# Patient Record
Sex: MALE | Race: WHITE | NOT HISPANIC OR LATINO | Employment: OTHER | URBAN - NONMETROPOLITAN AREA
[De-identification: names, ages, dates, MRNs, and addresses within clinical notes are randomized per-mention and may not be internally consistent; named-entity substitution may affect disease eponyms.]

---

## 2017-01-04 ENCOUNTER — OFFICE VISIT (OUTPATIENT)
Dept: OPHTHALMOLOGY | Facility: CLINIC | Age: 77
End: 2017-01-04

## 2017-01-04 DIAGNOSIS — Z96.1 PSEUDOPHAKIA: ICD-10-CM

## 2017-01-04 DIAGNOSIS — E11.9 DIABETES MELLITUS WITHOUT COMPLICATION (HCC): Primary | ICD-10-CM

## 2017-01-04 PROCEDURE — 99213 OFFICE O/P EST LOW 20 MIN: CPT | Performed by: OPHTHALMOLOGY

## 2017-01-04 NOTE — PROGRESS NOTES
Subjective   Eleno Savage is a 76 y.o. male.   Chief Complaint   Patient presents with   • Diabetic Eye Exam   • Artificial Lens Present       HPI     Diabetic Eye Exam   Vision is stable.  Diabetes characteristics include Type 2 and controlled with diet.  Duration of years.  Blood sugar level is controlled.       Last edited by Marito Elise MD on 1/4/2017  1:26 PM. (History)          Review of Systems   Eyes: Positive for visual disturbance. Negative for pain.       Objective   Visual Acuity (Snellen - Linear)      Right Left   Dist cc 20/20 20/30       Correction:  Glasses         Wearing Rx      Sphere Cylinder Axis Add   Right -0.75 +1.00 175 +2.50   Left -1.00 +1.00 179 +2.50           Manifest Refraction      Sphere Cylinder Axis   Right -0.75 +0.75 180   Left -1.00 +1.25 180       Comments:  R 20/20 L 20/30            Pupils      Pupils   Right PERRL   Left PERRL           Confrontational Visual Fields     Visual Fields      Left Right   Result Full Full                  Extraocular Movement      Right Left   Result Full, Ortho Full, Ortho              Tonometry (Applanation, 1:29 PM)      Right Left   Pressure 15 15              Main Ophthalmology Exam     External Exam      Right Left    External Normal Normal      Slit Lamp Exam      Right Left    Lids/Lashes Normal Normal    Conjunctiva/Sclera White and quiet White and quiet    Cornea Clear Clear    Anterior Chamber Deep and quiet Deep and quiet    Iris Round and reactive Round and reactive    Lens Posterior chamber intraocular lens Posterior chamber intraocular lens    Vitreous Normal Normal      Fundus Exam      Right Left    Disc Normal Normal    Macula Normal Normal    Vessels Normal Normal    Periphery Normal Normal    No BDR                Assessment/Plan   Problems Addressed this Visit     Pseudophakia    Diabetes mellitus without complication - Primary

## 2017-01-04 NOTE — MR AVS SNAPSHOT
Eleno Ferris Hussein   1/4/2017 1:00 PM   Office Visit    Dept Phone:  [phone]   Encounter #:  [id number]    Provider:  Marito Elise MD   Department:  [facility] OPHTHALMOLOGY                Your Full Care Plan              Your Updated Medication List          This list is accurate as of: 1/4/17  2:11 PM.  Always use your most recent med list.                ADVAIR DISKUS 250-50 MCG/DOSE DISKUS   Generic drug:  fluticasone-salmeterol       * albuterol (2.5 MG/3ML) 0.083% nebulizer solution   Commonly known as:  PROVENTIL       * PROAIR HFA IN       allopurinol 100 MG tablet   Commonly known as:  ZYLOPRIM   Take 1 tablet by mouth daily. FOR HIGH URIC ACID       amLODIPine-benazepril 5-20 MG per capsule   Commonly known as:  LOTREL 5-20       aspirin 325 MG tablet       busPIRone 10 MG tablet   Commonly known as:  BUSPAR   1 TABLET(S) BY MOUTH 2 TIMES PER DAY       carvedilol 3.125 MG tablet   Commonly known as:  COREG       cetirizine 10 MG tablet   Commonly known as:  zyrTEC   Take 1 tablet by mouth Daily As Needed for allergies. for allergies       clopidogrel 75 MG tablet   Commonly known as:  PLAVIX       DETROL LA 4 MG 24 hr capsule   Generic drug:  tolterodine LA       eszopiclone 3 MG tablet   Commonly known as:  LUNESTA   Take 1 tablet by mouth Every Night. Take immediately before bedtime       fenofibrate micronized 134 MG capsule   Commonly known as:  LOFIBRA   1 CAP(S) BY MOUTH DAILY       gabapentin 600 MG tablet   Commonly known as:  NEURONTIN       isosorbide mononitrate 30 MG 24 hr tablet   Commonly known as:  IMDUR       nitroglycerin 0.4 MG SL tablet   Commonly known as:  NITROSTAT       omeprazole 40 MG capsule   Commonly known as:  priLOSEC   TAKE 1 CAPSULE BY MOUTH TWO TIMES A DAY       SPIRIVA HANDIHALER 18 MCG per inhalation capsule   Generic drug:  tiotropium       tamsulosin 0.4 MG capsule 24 hr capsule   Commonly known as:  FLOMAX       traMADol 50 MG tablet   Commonly known as:  ULTRAM       traZODone 150 MG tablet   Commonly known as:  DESYREL   TAKE 1/2 TO 1 TABLET BY MOUTH EVERY NIGHT AT BEDTIME       triamterene-hydrochlorothiazide 37.5-25 MG per tablet   Commonly known as:  MAXZIDE-25       TUMS ULTRA PO       TYLENOL EXTRA STRENGTH PO       VITAMIN D2 PO       * Notice:  This list has 2 medication(s) that are the same as other medications prescribed for you. Read the directions carefully, and ask your doctor or other care provider to review them with you.            You Were Diagnosed With        Codes Comments    Diabetes mellitus without complication    -  Primary ICD-10-CM: E11.9  ICD-9-CM: 250.00     Pseudophakia     ICD-10-CM: Z96.1  ICD-9-CM: V43.1       Instructions     None    Patient Instructions History      Upcoming Appointments     Visit Type Date Time Department    OFFICE VISIT 2017  1:00 PM [facility] [place] [facility]    OFFICE VISIT 2017  9:45 AM [facility] [place] [facility]    FOLLOW UP 2017  8:15 AM [facility] [place] [facility]    OFFICE VISIT 2018  1:00 PM [facility] [place] [facility]     [facility] Walk Score allows you to send messages to your doctor, view your test results, renew your prescriptions, schedule appointments, and more. To sign up, go to [employer] and click on the Sign Up Now link in the New User? box. Enter your Walk Score Activation Code exactly as it appears below along with the last four digits of your Social Security Number and your Date of Birth () to complete the sign-up process. If you do not sign up before the expiration date, you must request a new code.    Walk Score Activation Code: [email or web address]  Expires: 2017  2:09 PM    If you have questions, you can email [email or web address] or call [phone] to talk to our Walk Score staff. Remember, Walk Score is NOT to be used for urgent needs. For medical emergencies, dial 911.               Other Info from Your Visit            Your Appointments     Jan 12, 2017  9:45 AM CST   Office Visit with Fabian Israel MD   [facility] PRIMARY CARE [place] (--)    [address] [place] [address] [address] [place] [address] [phone]           Arrive 15 minutes prior to appointment.            Aug 24, 2017  8:15 AM CDT   Follow Up with Charlie Dhillon MD   [facility] OTOLARYNGOLOGY (--)    [address] [place] [address] [address]  [facility] [address] [place] [address] [phone]           Arrive 15 minutes prior to appointment.            Jan 04, 2018  1:00 PM CST   Office Visit with Marito Elise MD   [facility] OPHTHALMOLOGY (--)    [address] [place] [address] [address]  [facility] [address] [facility] [address] [phone]           Arrive 15 minutes prior to appointment.              Allergies     Aminoglycosides      Bextra [Valdecoxib]      Celebrex [Celecoxib]      Contrast Dye      Indocin [Indomethacin]      Mobic [Meloxicam]      Motrin [Ibuprofen]      Niaspan [Niacin Er]      Nsaids      Vioxx [Rofecoxib]        Reason for Visit     Diabetic Eye Exam     Artificial Lens Present           Vital Signs     Smoking Status                   Former Smoker           Problems and Diagnoses Noted     Diabetes mellitus without complication    Pseudophakia

## 2017-01-05 ENCOUNTER — HOSPITAL ENCOUNTER (OUTPATIENT)
Dept: OTHER | Facility: HOSPITAL | Age: 77
Discharge: HOME | End: 2017-01-05

## 2017-01-05 LAB
ALBUMIN SERPL-MCNC: 3.9 GM/DL (ref 3.2–5.5)
ALP SERPL-CCNC: 57 U/L (ref 15–121)
ALT SERPL-CCNC: 20 U/L (ref 10–60)
ANION GAP SERPL CALCULATED.3IONS-SCNC: 8 MMOL/L (ref 5–15)
AST SERPL-CCNC: 20 U/L (ref 10–60)
BASOPHILS NFR BLD AUTO: 0.3 % (ref 0–2)
BILIRUB SERPL-MCNC: 0.6 MG/DL (ref 0.2–1)
BUN SERPL-MCNC: 22 MG/DL (ref 8–25)
CALCIUM SERPL-MCNC: 9.7 MG/DL (ref 8.4–10.8)
CHLORIDE SERPL-SCNC: 104 MMOL/L (ref 100–112)
CHOLEST SERPL-MCNC: 160 MG/DL (ref 150–200)
CO2 SERPL-SCNC: 29 MMOL/L (ref 20–32)
CREAT SERPL-MCNC: 1.8 MG/DL (ref 0.4–1.3)
EOSINOPHIL NFR BLD AUTO: 6.6 % (ref 0–7)
ERYTHROCYTE [DISTWIDTH] IN BLOOD: 14.4 % (ref 11.5–14.5)
GLUCOSE SERPL-MCNC: 123 MG/DL (ref 70–100)
GRANULOCYTES NFR BLD AUTO: 53.2 % (ref 37–80)
HCT VFR BLD CALC: 42.4 % (ref 39–49)
HDLC SERPL-MCNC: 23.9 MG/DL (ref 35–100)
HGB BLD-MCNC: 14.2 GM/DL (ref 13.7–17.3)
LDLC SERPL CALC-MCNC: 101 MG/DL
LYMPHOCYTES NFR BLD AUTO: 30.9 % (ref 10–50)
MCH RBC QN: 30.1 PG (ref 26–34)
MCHC RBC-ENTMCNC: 33.5 GM/DL (ref 31.5–36.3)
MCV RBC: 90 FL (ref 80–98)
MONOCYTES NFR BLD AUTO: 9 % (ref 0–12)
NRBC BLD AUTO-RTO: 0 %
NRBC SPEC MANUAL: 0
PLATELET # BLD: 215 X1000/MM3 (ref 150–450)
PMV BLD: 11.5 FL (ref 8–12)
POTASSIUM SERPL-SCNC: 4.4 MMOL/L (ref 3.4–5.4)
PROT SERPL-MCNC: 6.9 GM/DL (ref 6.7–8.2)
RBC # BLD: 4.71 MEGA/MM3 (ref 4.37–5.74)
SODIUM SERPL-SCNC: 141 MMOL/L (ref 134–146)
TRIGL SERPL-MCNC: 176 MG/DL (ref 35–160)
URATE SERPL-MCNC: 5.6 MG/DL (ref 2.6–7.2)
WBC # BLD: 5.8 X1000/UL (ref 3.2–9.8)

## 2017-01-05 RX ORDER — ISOSORBIDE MONONITRATE 30 MG/1
30 TABLET, EXTENDED RELEASE ORAL DAILY
Qty: 30 TABLET | Refills: 11 | Status: SHIPPED | OUTPATIENT
Start: 2017-01-05 | End: 2018-08-28 | Stop reason: SDUPTHER

## 2017-01-05 RX ORDER — ESZOPICLONE 3 MG/1
3 TABLET, FILM COATED ORAL NIGHTLY
Qty: 30 TABLET | Refills: 2 | Status: SHIPPED | OUTPATIENT
Start: 2017-01-05 | End: 2017-04-03 | Stop reason: SDUPTHER

## 2017-01-06 LAB
25(OH)D2 SERPL-MCNC: 45.5 NG/ML (ref 30–100)
CREAT UR-MCNC: 47.1 MG/DL
HBA1C MFR BLD CALC: 6.2 %TOTHGB (ref 4–5.6)
MICROALBUMIN UR-MCNC: 0.6 MG/DL (ref 0–1.7)
MICROALBUMIN/CREAT UR: 13 MG/G (ref 0–30)
TSH SERPL-ACNC: 1.32 UIU/ML (ref 0.46–4.68)

## 2017-01-09 RX ORDER — NITROGLYCERIN 0.4 MG/1
TABLET SUBLINGUAL
Qty: 25 TABLET | Refills: 11 | Status: SHIPPED | OUTPATIENT
Start: 2017-01-09 | End: 2019-01-16 | Stop reason: SDUPTHER

## 2017-01-12 ENCOUNTER — CLINICAL SUPPORT (OUTPATIENT)
Dept: FAMILY MEDICINE CLINIC | Facility: CLINIC | Age: 77
End: 2017-01-12

## 2017-01-12 ENCOUNTER — OFFICE VISIT (OUTPATIENT)
Dept: FAMILY MEDICINE CLINIC | Facility: CLINIC | Age: 77
End: 2017-01-12

## 2017-01-12 VITALS
WEIGHT: 266 LBS | HEART RATE: 88 BPM | DIASTOLIC BLOOD PRESSURE: 64 MMHG | SYSTOLIC BLOOD PRESSURE: 122 MMHG | TEMPERATURE: 97.8 F | BODY MASS INDEX: 41.75 KG/M2 | HEIGHT: 67 IN

## 2017-01-12 DIAGNOSIS — N18.30 CHRONIC KIDNEY DISEASE, STAGE 3 (HCC): ICD-10-CM

## 2017-01-12 DIAGNOSIS — E79.0 HYPERURICEMIA: ICD-10-CM

## 2017-01-12 DIAGNOSIS — E66.01 MORBID OBESITY DUE TO EXCESS CALORIES (HCC): ICD-10-CM

## 2017-01-12 DIAGNOSIS — K21.9 GASTROESOPHAGEAL REFLUX DISEASE, ESOPHAGITIS PRESENCE NOT SPECIFIED: ICD-10-CM

## 2017-01-12 DIAGNOSIS — E78.5 HYPERLIPIDEMIA, UNSPECIFIED HYPERLIPIDEMIA TYPE: ICD-10-CM

## 2017-01-12 DIAGNOSIS — Z12.5 SPECIAL SCREENING FOR MALIGNANT NEOPLASM OF PROSTATE: ICD-10-CM

## 2017-01-12 DIAGNOSIS — E55.9 VITAMIN D DEFICIENCY: ICD-10-CM

## 2017-01-12 DIAGNOSIS — Z23 PNEUMOCOCCAL VACCINATION ADMINISTERED AT CURRENT VISIT: Primary | ICD-10-CM

## 2017-01-12 DIAGNOSIS — I10 ESSENTIAL HYPERTENSION: ICD-10-CM

## 2017-01-12 DIAGNOSIS — J44.9 CHRONIC OBSTRUCTIVE PULMONARY DISEASE, UNSPECIFIED COPD TYPE (HCC): ICD-10-CM

## 2017-01-12 DIAGNOSIS — E11.8 TYPE 2 DIABETES MELLITUS WITH COMPLICATION, WITHOUT LONG-TERM CURRENT USE OF INSULIN (HCC): Primary | ICD-10-CM

## 2017-01-12 PROCEDURE — 99214 OFFICE O/P EST MOD 30 MIN: CPT | Performed by: INTERNAL MEDICINE

## 2017-01-12 PROCEDURE — 90471 IMMUNIZATION ADMIN: CPT | Performed by: INTERNAL MEDICINE

## 2017-01-12 PROCEDURE — 90670 PCV13 VACCINE IM: CPT | Performed by: INTERNAL MEDICINE

## 2017-01-12 NOTE — MR AVS SNAPSHOT
Eleno Ferris Hussein   1/12/2017 9:45 AM   Office Visit    Dept Phone:  [phone]   Encounter #:  [id number]    Provider:  Fabian Israel MD   Department:  [facility]IMARY C[facility] [place]                Your Full Care Plan              Your Updated Medication List          This list is accurate as of: 1/12/17 10:22 AM.  Always use your most recent med list.                ADVAIR DISKUS 250-50 MCG/DOSE DISKUS   Generic drug:  fluticasone-salmeterol       * albuterol (2.5 MG/3ML) 0.083% nebulizer solution   Commonly known as:  PROVENTIL       * PROAIR HFA IN       allopurinol 100 MG tablet   Commonly known as:  ZYLOPRIM   Take 1 tablet by mouth daily. FOR HIGH URIC ACID       amLODIPine-benazepril 5-20 MG per capsule   Commonly known as:  LOTREL 5-20       aspirin 325 MG tablet       busPIRone 10 MG tablet   Commonly known as:  BUSPAR   1 TABLET(S) BY MOUTH 2 TIMES PER DAY       carvedilol 3.125 MG tablet   Commonly known as:  COREG       cetirizine 10 MG tablet   Commonly known as:  zyrTEC   Take 1 tablet by mouth Daily As Needed for allergies. for allergies       clopidogrel 75 MG tablet   Commonly known as:  PLAVIX       DETROL LA 4 MG 24 hr capsule   Generic drug:  tolterodine LA       eszopiclone 3 MG tablet   Commonly known as:  LUNESTA   Take 1 tablet by mouth Every Night. Take immediately before bedtime       fenofibrate micronized 134 MG capsule   Commonly known as:  LOFIBRA   1 CAP(S) BY MOUTH DAILY       gabapentin 600 MG tablet   Commonly known as:  NEURONTIN       isosorbide mononitrate 30 MG 24 hr tablet   Commonly known as:  IMDUR   Take 1 tablet by mouth Daily.       NITROSTAT 0.4 MG SL tablet   Generic drug:  nitroglycerin   1 TABLET(S) SUBLINGUAL AS NEEDED FOR CHEST PAIN (MAY REPEAT EVERY 5 MINUTES BUT SEEK MEDICAL HELP IF PAIN PERSISTS AFTER 3 TABLETS)       omeprazole 40 MG capsule   Commonly known as:  priLOSEC   TAKE 1 CAPSULE BY MOUTH TWO TIMES A DAY       SPIRIVA HANDIHALER 18 MCG per inhalation capsule   Generic drug:  tiotropium       tamsulosin 0.4 MG capsule 24 hr capsule   Commonly known as:  FLOMAX       traMADol 50 MG tablet   Commonly known as:  ULTRAM       traZODone 150 MG tablet   Commonly known as:  DESYREL   TAKE 1/2 TO 1 TABLET BY MOUTH EVERY NIGHT AT BEDTIME       triamterene-hydrochlorothiazide 37.5-25 MG per tablet   Commonly known as:  MAXZIDE-25       TUMS ULTRA PO       TYLENOL EXTRA STRENGTH PO       VITAMIN D2 PO       * Notice:  This list has 2 medication(s) that are the same as other medications prescribed for you. Read the directions carefully, and ask your doctor or other care provider to review them with you.            You Were Diagnosed With        Codes Comments    Essential hypertension    -  Primary ICD-10-CM: I10  ICD-9-CM: 401.9     Chronic obstructive pulmonary disease, unspecified COPD type     ICD-10-CM: J44.9  ICD-9-CM: 496     Gastroesophageal reflux disease, esophagitis presence not specified     ICD-10-CM: K21.9  ICD-9-CM: 530.81     Morbid obesity due to excess calories     ICD-10-CM: E66.01  ICD-9-CM: 278.01     Vitamin D deficiency     ICD-10-CM: E55.9  ICD-9-CM: 268.9     Chronic kidney disease, stage 3     ICD-10-CM: N18.3  ICD-9-CM: 585.3     Hyperlipidemia, unspecified hyperlipidemia type     ICD-10-CM: E78.5  ICD-9-CM: 272.4     Hyperuricemia     ICD-10-CM: E79.0  ICD-9-CM: 790.6     Type 2 diabetes mellitus with complication, without long-term current use of insulin     ICD-10-CM: E11.8  ICD-9-CM: 250.90     Special screening for malignant neoplasm of prostate     ICD-10-CM: Z12.5  ICD-9-CM: V76.44       Instructions     None    Patient Instructions History      Upcoming Appointments     Visit Type Date Time Department    OFFICE VISIT 1/12/2017  9:45 AM [facility]    OFFICE VISIT 7/17/2017 10:15 AM [facility]    FOLLOW UP 8/24/2017  8:15 AM [facility] OTOLARYNGOLOGY [facility]    OFFICE VISIT 1/4/2018  1:00 PM [facility]  OPHTHALMOLOGY [facility]      [employer] Signup     [facility] upad allows you to send messages to your doctor, view your test results, renew your prescriptions, schedule appointments, and more. To sign up, go to [email or web address] and click on the Sign Up Now link in the New User? box. Enter your upad Activation Code exactly as it appears below along with the last four digits of your Social Security Number and your Date of Birth () to complete the sign-up process. If you do not sign up before the expiration date, you must request a new code.    upad Activation Code: [email or web address]  Expires: 2017  4:36 AM    If you have questions, you can email [email or web address] or call [phone] to talk to our upad staff. Remember, upad is NOT to be used for urgent needs. For medical emergencies, dial 911.               Other Info from Your Visit           Your Appointments     2017 10:15 AM CDT   Office Visit with Fabian Israel MD   [facility] PRIMARY CARE [place] (--)    [address] [place] [address] [address] [place] [address] [phone]           Arrive 15 minutes prior to appointment.            Aug 24, 2017  8:15 AM CDT   Follow Up with Charlie Dhillon MD   [facility] OTOLARYNGOLOGY (--)    [address] [place] [address] [address]  [facility] [address] [place] [address] [phone]           Arrive 15 minutes prior to appointment.            2018  1:00 PM CST   Office Visit with Marito Elise MD   [facility] OPHTHALMOLOGY (--)    [address] [place] [address] [address]  [facility] [address] [place] [address] [phone]           Arrive 15 minutes prior to appointment.              Allergies     Aminoglycosides      Bextra [Valdecoxib]      Celebrex [Celecoxib]      Contrast Dye      Indocin [Indomethacin]      Mobic [Meloxicam]      Motrin [Ibuprofen]      Niaspan [Niacin Er]      Nsaids      Vioxx [Rofecoxib]        Reason for  "Visit     Follow-up 6 month    Results lab      Vital Signs     Blood Pressure Pulse Temperature Height Weight Body Mass Index    122/64 88 97.8 °F (36.6 °C) 67\" (170.2 cm) 266 lb (121 kg) 41.66 kg/m2    Smoking Status                   Former Smoker           Problems and Diagnoses Noted     Chronic kidney disease, stage 3    Chronic airway obstruction    High blood pressure    Acid reflux disease    High cholesterol or triglycerides    Elevated blood uric acid level    Obesity    Type 2 diabetes    Vitamin D deficiency    Screening for prostate cancer            "

## 2017-01-12 NOTE — PROGRESS NOTES
"Subjective     Eleno Savage is a 76 y.o. male.     History of Present Illness   Eleno is here for followup of issues including Type 2 diabetes, stage III chronic kidney disease, obesity,  COPD, GERD,  vitamin D deficiency, and high cholesterol/high triglycerides.  Dr. Farooq follows his coronary artery disease.  He continues to follow with Dr. Weathers, nephrology annually for CKD.      His blood pressure is well controlled today.    I recommend Prevnar vaccine today and he is in agreement.  He had Pneumovax several years ago.    His weight is up 8 pounds in the past 6 months.  His prior weight in the summer was 258 pounds.  We had a lengthy discussion regarding his orbital obesity and need to aggressively reduce weight.  He is mildly motivated.    His labs are all reviewed with results listed below.  A1c is 6.2.  LDL is 101.  Creatinine is slightly improved at 1.8.    Review of Systems   Constitutional: Negative for chills, fatigue and fever.   HENT: Negative for congestion, ear pain, postnasal drip, sinus pressure and sore throat.    Respiratory: Negative for cough, shortness of breath and wheezing.    Cardiovascular: Positive for leg swelling. Negative for chest pain and palpitations.   Gastrointestinal: Negative for abdominal pain, blood in stool, constipation, diarrhea, nausea and vomiting.   Endocrine: Negative for cold intolerance, heat intolerance, polydipsia and polyuria.   Genitourinary: Negative for dysuria, frequency, hematuria and urgency.   Skin: Negative for rash.   Neurological: Negative for syncope and weakness.       Objective     Visit Vitals   • /64   • Pulse 88   • Temp 97.8 °F (36.6 °C)   • Ht 67\" (170.2 cm)   • Wt 266 lb (121 kg)   • BMI 41.66 kg/m2       Physical Exam   Constitutional: He is oriented to person, place, and time. He appears well-developed and well-nourished. No distress.   Pleasant, morbidly obese.  accompanied by his wife.   HENT:   Head: Normocephalic and atraumatic. "   Nose: Right sinus exhibits no maxillary sinus tenderness and no frontal sinus tenderness. Left sinus exhibits no maxillary sinus tenderness and no frontal sinus tenderness.   Mouth/Throat: Uvula is midline, oropharynx is clear and moist and mucous membranes are normal. No oral lesions. No tonsillar exudate.   Crowded posterior oropharynx   Eyes: Conjunctivae and EOM are normal. Pupils are equal, round, and reactive to light.   Neck: Trachea normal. Neck supple. No JVD present. Carotid bruit is not present. No tracheal deviation present. No thyroid mass and no thyromegaly present.   Cardiovascular: Normal rate, regular rhythm and normal heart sounds.   No extrasystoles are present. PMI is not displaced.    No murmur heard.  Pulmonary/Chest: Effort normal and breath sounds normal. No accessory muscle usage. No respiratory distress. He has no decreased breath sounds. He has no wheezes. He has no rhonchi. He has no rales.   Abdominal: Soft. Bowel sounds are normal. He exhibits no distension. There is no hepatosplenomegaly. There is no tenderness.       Vascular Status -  His exam exhibits right foot vasculature normal and right foot edema (trace pedal and ankle edema bilaterally consistent with chronic venous insufficiency). His exam exhibits left foot vasculature normal and left foot edema.  Lymphadenopathy:     He has no cervical adenopathy.   Neurological: He is alert and oriented to person, place, and time. No cranial nerve deficit. Coordination normal.   Skin: Skin is warm, dry and intact. No rash noted. No cyanosis. Nails show no clubbing.   Psychiatric: He has a normal mood and affect. His speech is normal and behavior is normal. Thought content normal.   Vitals reviewed.      Assessment/Plan   Prevnar vaccine today.    Greatly intensify efforts at diabetic diet, exercise, and weight loss.  His carbohydrate intake is excessive, particularly breads including biscuits and cornbread.  Diagnoses and all orders for  this visit:    Type 2 diabetes mellitus with complication, without long-term current use of insulin  -     TSH; Future  -     CBC Auto Differential; Future  -     Comprehensive Metabolic Panel; Future  -     Hemoglobin A1c; Future  -     LDL Cholesterol, Direct; Future    Essential hypertension    Chronic obstructive pulmonary disease, unspecified COPD type    Gastroesophageal reflux disease, esophagitis presence not specified    Morbid obesity due to excess calories    Vitamin D deficiency  -     Vitamin D 25 Hydroxy; Future    Chronic kidney disease, stage 3    Hyperlipidemia, unspecified hyperlipidemia type  -     LDL Cholesterol, Direct; Future    Hyperuricemia    Special screening for malignant neoplasm of prostate  -     PSA Screen; Future      Hospital Outpatient Visit on 01/05/2017   Component Date Value Ref Range Status   • WBC 01/05/2017 5.8  3.2 - 9.8 x1000/uL Final   • RBC 01/05/2017 4.71  4.37 - 5.74 anne-marie/mm3 Final   • Hemoglobin 01/05/2017 14.2  13.7 - 17.3 gm/dl Final   • Hematocrit 01/05/2017 42.4  39.0 - 49.0 % Final   • MCV 01/05/2017 90.0  80.0 - 98.0 fl Final   • MCH 01/05/2017 30.1  26.0 - 34.0 pg Final   • MCHC 01/05/2017 33.5  31.5 - 36.3 gm/dl Final   • Platelets 01/05/2017 215  150 - 450 x1000/mm3 Final   • RDW 01/05/2017 14.4  11.5 - 14.5 % Final   • MPV 01/05/2017 11.5  8.0 - 12.0 fl Final   • Neutrophil Rel % 01/05/2017 53.2  37.0 - 80.0 % Final   • Lymphocyte Rel % 01/05/2017 30.9  10.0 - 50.0 % Final   • Monocyte Rel % 01/05/2017 9.0  0.0 - 12.0 % Final   • Eosinophil Rel % 01/05/2017 6.6  0.0 - 7.0 % Final   • Basophil Rel % 01/05/2017 0.3  0.0 - 2.0 % Final   • nRBC 01/05/2017 0   Final   • nRBC 01/05/2017 0   Final   • Glucose 01/05/2017 123* 70.0 - 100.0 mg/dl Final   • BUN 01/05/2017 22  8.0 - 25.0 mg/dl Final   • Creatinine 01/05/2017 1.8* 0.4 - 1.3 mg/dl Final   • Sodium 01/05/2017 141.0  134 - 146 mmol/L Final   • Potassium 01/05/2017 4.4  3.4 - 5.4 mmol/L Final   • Chloride  01/05/2017 104.0  100.0 - 112.0 mmol/L Final   • CO2 01/05/2017 29.0  20.0 - 32.0 mmol/L Final   • Calcium 01/05/2017 9.7  8.4 - 10.8 mg/dl Final   • Total Protein 01/05/2017 6.9  6.7 - 8.2 gm/dl Final   • Albumin 01/05/2017 3.9  3.2 - 5.5 gm/dl Final   • Total Bilirubin 01/05/2017 0.6  0.2 - 1.0 mg/dl Final   • Alkaline Phosphatase 01/05/2017 57  15 - 121 U/L Final   • ALT (SGPT) 01/05/2017 20  10 - 60 U/L Final   • AST (SGOT) 01/05/2017 20  10 - 60 U/L Final   • GFR MDRD Non  01/05/2017 37* 42 - 98 mL/min/1.73 sq.M Final    Comment: Invalid if creatinine is changing or the patient is on dialysis. Use AA  result if patient is -American, non AA result otherwise.     • GFR MDRD  01/05/2017 44  42 - 98 mL/min/1.73 sq.M Final   • Anion Gap 01/05/2017 8.0  5.0 - 15.0 mmol/L Final   • Uric Acid 01/05/2017 5.6  2.6 - 7.2 mg/dl Final   • Total Cholesterol 01/05/2017 160  150 - 200 mg/dl Final    CHOL DESIRED: < 200 MG/DL   • Triglycerides 01/05/2017 176* 35 - 160 mg/dl Final    TRIG DESIRED: < 200 MG/DL   • HDL Cholesterol 01/05/2017 23.9* 35.0 - 100.0 mg/dl Final    HDL HIGH RISK: < 35 MG/DL   • LDL Cholesterol  01/05/2017 101  mg/dl Final    Comment: LDL DESIRED: < 130 MG/DL  LDL DESIRED: < 130 MG/DL  LDL DESIRED: < 130 MG/DL     • 25 Hydroxy, Vitamin D 01/05/2017 45.5  30.0 - 100.0 ng/ml Final    Comment: INTERPRETIVE INFORMATION:  Deficient...................<20 ng/ml  Insufficient..........20-<30 ng/ml  Sufficient.............. ng/ml  Potiential Toxicity.....100 ng/ml       • Hemoglobin A1C 01/05/2017 6.2* 4.0 - 5.6 %TotHgb Final   • TSH 01/05/2017 1.32  0.46 - 4.68 uIU/ml Final   • Creatinine, Urine 01/05/2017 47.1  mg/dl Final   • Albumin, U 01/05/2017 0.6  0.0 - 1.7 mg/dl Final   • Microalbumin/Creatinine Ratio 01/05/2017 13  0 - 30 mg/g Final   ]

## 2017-01-17 RX ORDER — GABAPENTIN 600 MG/1
TABLET ORAL
Qty: 30 TABLET | Refills: 6 | Status: SHIPPED | OUTPATIENT
Start: 2017-01-17 | End: 2017-07-17 | Stop reason: SDUPTHER

## 2017-02-06 RX ORDER — TAMSULOSIN HYDROCHLORIDE 0.4 MG/1
1 CAPSULE ORAL NIGHTLY
Qty: 30 CAPSULE | Refills: 5 | Status: SHIPPED | OUTPATIENT
Start: 2017-02-06 | End: 2017-07-05 | Stop reason: SDUPTHER

## 2017-03-06 RX ORDER — CARVEDILOL 3.12 MG/1
TABLET ORAL
Qty: 60 TABLET | Refills: 11 | Status: SHIPPED | OUTPATIENT
Start: 2017-03-06 | End: 2018-04-25 | Stop reason: SDUPTHER

## 2017-03-23 ENCOUNTER — LAB (OUTPATIENT)
Dept: LAB | Facility: OTHER | Age: 77
End: 2017-03-23

## 2017-03-23 ENCOUNTER — TRANSCRIBE ORDERS (OUTPATIENT)
Dept: LAB | Facility: OTHER | Age: 77
End: 2017-03-23

## 2017-03-23 DIAGNOSIS — N18.30 CHRONIC RENAL DISEASE, STAGE III (HCC): Primary | ICD-10-CM

## 2017-03-23 DIAGNOSIS — N18.30 CHRONIC RENAL DISEASE, STAGE III (HCC): ICD-10-CM

## 2017-03-23 LAB
ALBUMIN SERPL-MCNC: 3.8 G/DL (ref 3.2–5.5)
ANION GAP SERPL CALCULATED.3IONS-SCNC: 10 MMOL/L (ref 5–15)
BUN BLD-MCNC: 30 MG/DL (ref 8–25)
BUN/CREAT SERPL: 17.6 (ref 7–25)
CALCIUM SPEC-SCNC: 9.5 MG/DL (ref 8.4–10.8)
CHLORIDE SERPL-SCNC: 107 MMOL/L (ref 100–112)
CO2 SERPL-SCNC: 26 MMOL/L (ref 20–32)
CREAT BLD-MCNC: 1.7 MG/DL (ref 0.4–1.3)
GFR SERPL CREATININE-BSD FRML MDRD: 39 ML/MIN/1.73 (ref 42–98)
GLUCOSE BLD-MCNC: 113 MG/DL (ref 70–100)
PHOSPHATE SERPL-MCNC: 3.4 MG/DL (ref 2.5–4.6)
POTASSIUM BLD-SCNC: 4.2 MMOL/L (ref 3.4–5.4)
SODIUM BLD-SCNC: 143 MMOL/L (ref 134–146)

## 2017-03-23 PROCEDURE — 80069 RENAL FUNCTION PANEL: CPT | Performed by: INTERNAL MEDICINE

## 2017-03-23 PROCEDURE — 36415 COLL VENOUS BLD VENIPUNCTURE: CPT | Performed by: INTERNAL MEDICINE

## 2017-03-23 PROCEDURE — 84156 ASSAY OF PROTEIN URINE: CPT | Performed by: INTERNAL MEDICINE

## 2017-03-23 PROCEDURE — 82570 ASSAY OF URINE CREATININE: CPT | Performed by: INTERNAL MEDICINE

## 2017-03-25 LAB
CREAT 24H UR-MCNC: 64.5 MG/DL
PROT UR-MCNC: 7.2 MG/DL
PROT/CREAT UR: 112 MG/G CREAT (ref 0–200)

## 2017-04-05 RX ORDER — ESZOPICLONE 3 MG/1
TABLET, FILM COATED ORAL
Qty: 30 TABLET | Refills: 2 | Status: SHIPPED | OUTPATIENT
Start: 2017-04-05 | End: 2017-07-05 | Stop reason: SDUPTHER

## 2017-05-09 RX ORDER — AMLODIPINE BESYLATE AND BENAZEPRIL HYDROCHLORIDE 5; 20 MG/1; MG/1
CAPSULE ORAL
Qty: 60 CAPSULE | Refills: 11 | Status: SHIPPED | OUTPATIENT
Start: 2017-05-09 | End: 2018-04-30 | Stop reason: SDUPTHER

## 2017-06-20 RX ORDER — TOLTERODINE 4 MG/1
4 CAPSULE, EXTENDED RELEASE ORAL NIGHTLY
Qty: 30 CAPSULE | Refills: 11 | Status: SHIPPED | OUTPATIENT
Start: 2017-06-20 | End: 2018-06-13 | Stop reason: SDUPTHER

## 2017-07-05 RX ORDER — TAMSULOSIN HYDROCHLORIDE 0.4 MG/1
CAPSULE ORAL
Qty: 30 CAPSULE | Refills: 5 | Status: SHIPPED | OUTPATIENT
Start: 2017-07-05 | End: 2018-01-03 | Stop reason: SDUPTHER

## 2017-07-05 RX ORDER — ESZOPICLONE 3 MG/1
TABLET, FILM COATED ORAL
Qty: 30 TABLET | Refills: 2 | OUTPATIENT
Start: 2017-07-05 | End: 2017-07-07 | Stop reason: SDUPTHER

## 2017-07-07 RX ORDER — ESZOPICLONE 3 MG/1
TABLET, FILM COATED ORAL
Qty: 30 TABLET | Refills: 2 | Status: SHIPPED | OUTPATIENT
Start: 2017-07-07 | End: 2017-12-29 | Stop reason: SDUPTHER

## 2017-07-10 ENCOUNTER — LAB (OUTPATIENT)
Dept: LAB | Facility: OTHER | Age: 77
End: 2017-07-10

## 2017-07-10 DIAGNOSIS — E11.8 TYPE 2 DIABETES MELLITUS WITH COMPLICATION, WITHOUT LONG-TERM CURRENT USE OF INSULIN (HCC): ICD-10-CM

## 2017-07-10 DIAGNOSIS — E78.5 HYPERLIPIDEMIA, UNSPECIFIED HYPERLIPIDEMIA TYPE: ICD-10-CM

## 2017-07-10 DIAGNOSIS — E55.9 VITAMIN D DEFICIENCY: ICD-10-CM

## 2017-07-10 DIAGNOSIS — Z12.5 SPECIAL SCREENING FOR MALIGNANT NEOPLASM OF PROSTATE: ICD-10-CM

## 2017-07-10 LAB
ALBUMIN SERPL-MCNC: 4.1 G/DL (ref 3.2–5.5)
ALBUMIN/GLOB SERPL: 1.4 G/DL (ref 1–3)
ALP SERPL-CCNC: 59 U/L (ref 15–121)
ALT SERPL W P-5'-P-CCNC: 17 U/L (ref 10–60)
ANION GAP SERPL CALCULATED.3IONS-SCNC: 10 MMOL/L (ref 5–15)
ARTICHOKE IGE QN: 112 MG/DL (ref 0–129)
AST SERPL-CCNC: 17 U/L (ref 10–60)
BASOPHILS # BLD AUTO: 0.02 10*3/MM3 (ref 0–0.2)
BASOPHILS NFR BLD AUTO: 0.3 % (ref 0–2)
BILIRUB SERPL-MCNC: 0.5 MG/DL (ref 0.2–1)
BUN BLD-MCNC: 33 MG/DL (ref 8–25)
BUN/CREAT SERPL: 17.4 (ref 7–25)
CALCIUM SPEC-SCNC: 9.8 MG/DL (ref 8.4–10.8)
CHLORIDE SERPL-SCNC: 105 MMOL/L (ref 100–112)
CO2 SERPL-SCNC: 27 MMOL/L (ref 20–32)
CREAT BLD-MCNC: 1.9 MG/DL (ref 0.4–1.3)
DEPRECATED RDW RBC AUTO: 47.9 FL (ref 35.1–43.9)
EOSINOPHIL # BLD AUTO: 0.31 10*3/MM3 (ref 0–0.7)
EOSINOPHIL NFR BLD AUTO: 4.5 % (ref 0–7)
ERYTHROCYTE [DISTWIDTH] IN BLOOD BY AUTOMATED COUNT: 14.6 % (ref 11.5–14.5)
GFR SERPL CREATININE-BSD FRML MDRD: 34 ML/MIN/1.73 (ref 42–98)
GLOBULIN UR ELPH-MCNC: 2.9 GM/DL (ref 2.5–4.6)
GLUCOSE BLD-MCNC: 121 MG/DL (ref 70–100)
HCT VFR BLD AUTO: 43.7 % (ref 39–49)
HGB BLD-MCNC: 14.6 G/DL (ref 13.7–17.3)
LYMPHOCYTES # BLD AUTO: 1.69 10*3/MM3 (ref 0.6–4.2)
LYMPHOCYTES NFR BLD AUTO: 24.6 % (ref 10–50)
MCH RBC QN AUTO: 30.7 PG (ref 26.5–34)
MCHC RBC AUTO-ENTMCNC: 33.4 G/DL (ref 31.5–36.3)
MCV RBC AUTO: 91.8 FL (ref 80–98)
MONOCYTES # BLD AUTO: 0.64 10*3/MM3 (ref 0–0.9)
MONOCYTES NFR BLD AUTO: 9.3 % (ref 0–12)
NEUTROPHILS # BLD AUTO: 4.2 10*3/MM3 (ref 2–8.6)
NEUTROPHILS NFR BLD AUTO: 61.3 % (ref 37–80)
PLATELET # BLD AUTO: 195 10*3/MM3 (ref 150–450)
PMV BLD AUTO: 12.1 FL (ref 8–12)
POTASSIUM BLD-SCNC: 4.6 MMOL/L (ref 3.4–5.4)
PROT SERPL-MCNC: 7 G/DL (ref 6.7–8.2)
RBC # BLD AUTO: 4.76 10*6/MM3 (ref 4.37–5.74)
SODIUM BLD-SCNC: 142 MMOL/L (ref 134–146)
WBC NRBC COR # BLD: 6.86 10*3/MM3 (ref 3.2–9.8)

## 2017-07-10 PROCEDURE — 80053 COMPREHEN METABOLIC PANEL: CPT | Performed by: INTERNAL MEDICINE

## 2017-07-10 PROCEDURE — 82306 VITAMIN D 25 HYDROXY: CPT | Performed by: INTERNAL MEDICINE

## 2017-07-10 PROCEDURE — 36415 COLL VENOUS BLD VENIPUNCTURE: CPT | Performed by: INTERNAL MEDICINE

## 2017-07-10 PROCEDURE — G0103 PSA SCREENING: HCPCS | Performed by: INTERNAL MEDICINE

## 2017-07-10 PROCEDURE — 83036 HEMOGLOBIN GLYCOSYLATED A1C: CPT | Performed by: INTERNAL MEDICINE

## 2017-07-10 PROCEDURE — 85025 COMPLETE CBC W/AUTO DIFF WBC: CPT | Performed by: INTERNAL MEDICINE

## 2017-07-10 PROCEDURE — 83721 ASSAY OF BLOOD LIPOPROTEIN: CPT | Performed by: INTERNAL MEDICINE

## 2017-07-10 PROCEDURE — 84443 ASSAY THYROID STIM HORMONE: CPT | Performed by: INTERNAL MEDICINE

## 2017-07-11 LAB
25(OH)D3 SERPL-MCNC: 50.4 NG/ML (ref 30–100)
HBA1C MFR BLD: 6.12 % (ref 4–5.6)
PSA SERPL-MCNC: 0.1 NG/ML (ref 0–4)
TSH SERPL DL<=0.05 MIU/L-ACNC: 1.37 MIU/ML (ref 0.46–4.68)

## 2017-07-17 ENCOUNTER — OFFICE VISIT (OUTPATIENT)
Dept: FAMILY MEDICINE CLINIC | Facility: CLINIC | Age: 77
End: 2017-07-17

## 2017-07-17 VITALS
WEIGHT: 264 LBS | HEIGHT: 67 IN | BODY MASS INDEX: 41.44 KG/M2 | SYSTOLIC BLOOD PRESSURE: 140 MMHG | HEART RATE: 80 BPM | TEMPERATURE: 98.7 F | DIASTOLIC BLOOD PRESSURE: 60 MMHG

## 2017-07-17 DIAGNOSIS — I25.10 CORONARY ARTERIOSCLEROSIS: Chronic | ICD-10-CM

## 2017-07-17 DIAGNOSIS — N40.1 BENIGN PROSTATIC HYPERPLASIA WITH LOWER URINARY TRACT SYMPTOMS, UNSPECIFIED MORPHOLOGY: Chronic | ICD-10-CM

## 2017-07-17 DIAGNOSIS — K21.9 GASTROESOPHAGEAL REFLUX DISEASE, ESOPHAGITIS PRESENCE NOT SPECIFIED: Chronic | ICD-10-CM

## 2017-07-17 DIAGNOSIS — E55.9 VITAMIN D DEFICIENCY: Chronic | ICD-10-CM

## 2017-07-17 DIAGNOSIS — J43.2 CENTRILOBULAR EMPHYSEMA (HCC): Chronic | ICD-10-CM

## 2017-07-17 DIAGNOSIS — E78.2 MIXED HYPERLIPIDEMIA: Chronic | ICD-10-CM

## 2017-07-17 DIAGNOSIS — E78.1 HYPERTRIGLYCERIDEMIA: Chronic | ICD-10-CM

## 2017-07-17 DIAGNOSIS — G47.33 OBSTRUCTIVE SLEEP APNEA SYNDROME: Chronic | ICD-10-CM

## 2017-07-17 DIAGNOSIS — E79.0 HYPERURICEMIA: Chronic | ICD-10-CM

## 2017-07-17 DIAGNOSIS — M10.30 GOUT DUE TO RENAL IMPAIRMENT, UNSPECIFIED CHRONICITY, UNSPECIFIED SITE: Chronic | ICD-10-CM

## 2017-07-17 DIAGNOSIS — E66.01 MORBID OBESITY DUE TO EXCESS CALORIES (HCC): Chronic | ICD-10-CM

## 2017-07-17 DIAGNOSIS — E11.22 TYPE 2 DIABETES MELLITUS WITH STAGE 3 CHRONIC KIDNEY DISEASE, WITHOUT LONG-TERM CURRENT USE OF INSULIN (HCC): Primary | Chronic | ICD-10-CM

## 2017-07-17 DIAGNOSIS — N18.30 CHRONIC KIDNEY DISEASE, STAGE 3 (HCC): Chronic | ICD-10-CM

## 2017-07-17 DIAGNOSIS — N18.30 TYPE 2 DIABETES MELLITUS WITH STAGE 3 CHRONIC KIDNEY DISEASE, WITHOUT LONG-TERM CURRENT USE OF INSULIN (HCC): Primary | Chronic | ICD-10-CM

## 2017-07-17 PROBLEM — E11.9 DIABETES MELLITUS WITHOUT COMPLICATION: Chronic | Status: ACTIVE | Noted: 2017-01-04

## 2017-07-17 PROCEDURE — 99214 OFFICE O/P EST MOD 30 MIN: CPT | Performed by: INTERNAL MEDICINE

## 2017-07-17 RX ORDER — CLOPIDOGREL BISULFATE 75 MG/1
TABLET ORAL
Qty: 30 TABLET | Refills: 12 | Status: SHIPPED | OUTPATIENT
Start: 2017-07-17 | End: 2018-08-08 | Stop reason: SDUPTHER

## 2017-07-17 RX ORDER — ATORVASTATIN CALCIUM 20 MG/1
20 TABLET, FILM COATED ORAL EVERY OTHER DAY
Qty: 15 TABLET | Refills: 11 | Status: SHIPPED | OUTPATIENT
Start: 2017-07-17 | End: 2018-01-19

## 2017-07-17 RX ORDER — GABAPENTIN 600 MG/1
TABLET ORAL
Qty: 30 TABLET | Refills: 6 | Status: SHIPPED | OUTPATIENT
Start: 2017-07-17 | End: 2018-01-19 | Stop reason: SDUPTHER

## 2017-07-17 NOTE — PROGRESS NOTES
Subjective         History of Present Illness     Eleno Savage is a 77 y.o. Male here for 6-month follow up on type 2 diabetes mellitus, stage 3 chronic kidney disease, obesity, COPD, GERD, vitamin D deficiency, and high cholesterol/high triglycerides.  Dr. Mesa is his cardiologist following his coronary artery disease.  He reports a strong family history of coronary disease, but denies any current chest pain.  He also follows with Dr. Weathers, nephrologist annually for CKD.  He continues to wear CPAP for sleep apnea symptoms.  GERD symptoms are currently adequately controlled.  His diabetes is excellently controlled.        He reports mild BPH symptoms reporting nocturia x 2 nightly.  He reports daytime urinary stream is adequate.      He continues to have chronic osteoarthrtic pain at multiple joints.  We discussed how weight loss and physical activity would benefit the pain as well as overall health.  I recommended he take Tylenol in combination with his pain regimen to help with pain.        His LDL is above goal in this patient with coronary artery disease.  He is on fenofibrate currently.  With his stage 3 kidney disease, I recommended we try statin therapy.  He reports he took Lipitor in the past and thinks he may have had some myalgias, but isn't exactly sure.  I recommended he stop the fenofibrate and start Lipitor every other day.         Blood pressure is at goal.   He reports blood pressure was 120/67 at home this morning.  Weight is down 2 pounds in the past six months.      The patient's relevant past medical, surgical, and social history was reviewed in Epic.   Lab results are reviewed with the patient today.  Fasting glucose 121.  A1c is 6.12.  Liver  function normal.  Renal function   Thyroid function normal.  LDL is 112. PSA is normal.     Review of Systems   Constitutional: Negative for chills, fatigue and fever.   HENT: Negative for congestion, ear pain, postnasal drip, sinus pressure and  "sore throat.    Respiratory: Negative for cough, shortness of breath and wheezing.    Cardiovascular: Negative for chest pain, palpitations and leg swelling.   Gastrointestinal: Negative for abdominal pain, blood in stool, constipation, diarrhea, nausea and vomiting.   Endocrine: Negative for cold intolerance, heat intolerance, polydipsia and polyuria.   Genitourinary: Negative for dysuria, frequency, hematuria and urgency.   Skin: Negative for rash.   Neurological: Negative for syncope and weakness.      PHQ-9 Depression Screening 7/17/2017   Little interest or pleasure in doing things 0   Feeling down, depressed, or hopeless 0   PHQ-9 Total Score 0       Objective     Vitals:    07/17/17 1009   BP: 140/60   Pulse: 80   Temp: 98.7 °F (37.1 °C)   TempSrc: Oral   Weight: 264 lb (120 kg)   Height: 67\" (170.2 cm)     Physical Exam   Constitutional: He is oriented to person, place, and time. He appears well-developed and well-nourished. No distress.   Obese male.    HENT:   Head: Normocephalic and atraumatic.   Nose: Right sinus exhibits no maxillary sinus tenderness and no frontal sinus tenderness. Left sinus exhibits no maxillary sinus tenderness and no frontal sinus tenderness.   Mouth/Throat: Uvula is midline, oropharynx is clear and moist and mucous membranes are normal. No oral lesions. No tonsillar exudate.   Eyes: Conjunctivae and EOM are normal. Pupils are equal, round, and reactive to light.   Neck: Trachea normal. Neck supple. No JVD present. Carotid bruit is not present. No tracheal deviation present. No thyroid mass and no thyromegaly present.   Cardiovascular: Normal rate, regular rhythm and normal heart sounds.   No extrasystoles are present. PMI is not displaced.    No murmur heard.  Pulmonary/Chest: Effort normal and breath sounds normal. No accessory muscle usage. No respiratory distress. He has no decreased breath sounds. He has no wheezes. He has no rhonchi. He has no rales.   Abdominal: Soft. Bowel " sounds are normal. He exhibits no distension. There is no hepatosplenomegaly. There is no tenderness.   Obese abdomen limits exam.         Vascular Status -  His exam exhibits right foot edema (1+ edema bilaterally consistent with chronic venous insufficiency). His exam exhibits right foot vasculature abnormal. His exam exhibits left foot edema. His exam exhibits left foot vasculature abnormal.  Lymphadenopathy:     He has no cervical adenopathy.   Neurological: He is alert and oriented to person, place, and time. No cranial nerve deficit. Coordination normal.   Skin: Skin is warm, dry and intact. No rash noted. No cyanosis. Nails show no clubbing.   Psychiatric: He has a normal mood and affect. His speech is normal and behavior is normal. Thought content normal.   Vitals reviewed.       Assessment/Plan      Stop the fenofibrate and start Lipitor 20 mg q.o.d.   I asked him to double his efforts at reducing carbohydrates, especially with stopping the fenofibrate.  Notify me if he has tolerability issues with statin therapy.  I would like to avoid to drug therapy in this patient if possible.      He is given a refill on his  Gabapentin 600 mg.  Patient understands the risks associated with this controlled medication, including tolerance and addiction.  He also agrees to only obtain this medication from me, and not from a another provider, unless that provider is covering for me in my absence.  He also agrees to be compliant in dosing, and not self adjust the dose of medication.  A signed controlled substance agreement is on file, and he has received a controlled substance education sheet at this a previous visit.  He has also signed a consent for treatment with a controlled substance as per [facility] policy. NEFTALY was obtained.    I recommended he add Tylenol 500 mg 3-4 times daily as needed for breakthrough arthritic symptoms.      I emphasized how very important it is that he intensify diet and weight loss  activities.  He reports being rather physically active, and we encouraged that as well.  His morbid obesity is producing multiple medical and musculoskeletal issues.    Continue other medications and vitamin and mineral supplements to treat additional medical problems which we addressed today.  He will return in six months for follow up with fasting labs one week prior.   Intensify diet, exercise, and weight loss efforts.  Written literature regarding weight loss and exercise included in AVS today.       Scribed for Dr. Israel by Lacy Bustos [facility].     Diagnoses and all orders for this visit:    Type 2 diabetes mellitus with stage 3 chronic kidney disease, without long-term current use of insulin  -     CBC Auto Differential; Future  -     Comprehensive Metabolic Panel; Future  -     Hemoglobin A1c; Future    Hypertriglyceridemia    Mixed hyperlipidemia    Coronary arteriosclerosis    Obstructive sleep apnea syndrome    Centrilobular emphysema    Vitamin D deficiency  -     Vitamin D 25 Hydroxy; Future    Gastroesophageal reflux disease, esophagitis presence not specified    Morbid obesity due to excess calories    Chronic kidney disease, stage 3    Benign prostatic hyperplasia with lower urinary tract symptoms, unspecified morphology    Hyperuricemia  -     Uric Acid; Future    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 For cholesterol      Lab on 07/10/2017   Component Date Value Ref Range Status   • 25 Hydroxy, Vitamin D 07/10/2017 50.4  30.0 - 100.0 ng/ml Final   • TSH 07/10/2017 1.370  0.460 - 4.680 mIU/mL Final   • WBC 07/10/2017 6.86  3.20 - 9.80 10*3/mm3 Final   • RBC 07/10/2017 4.76  4.37 - 5.74 10*6/mm3 Final   • Hemoglobin 07/10/2017 14.6  13.7 - 17.3 g/dL Final   • Hematocrit 07/10/2017 43.7  39.0 - 49.0 %  Final   • MCV 07/10/2017 91.8  80.0 - 98.0 fL Final   • MCH 07/10/2017 30.7  26.5 - 34.0 pg Final   • MCHC 07/10/2017 33.4  31.5 - 36.3 g/dL Final   • RDW 07/10/2017 14.6* 11.5 - 14.5 % Final   • RDW-SD 07/10/2017 47.9* 35.1 - 43.9 fl Final   • MPV 07/10/2017 12.1* 8.0 - 12.0 fL Final   • Platelets 07/10/2017 195  150 - 450 10*3/mm3 Final   • Neutrophil % 07/10/2017 61.3  37.0 - 80.0 % Final   • Lymphocyte % 07/10/2017 24.6  10.0 - 50.0 % Final   • Monocyte % 07/10/2017 9.3  0.0 - 12.0 % Final   • Eosinophil % 07/10/2017 4.5  0.0 - 7.0 % Final   • Basophil % 07/10/2017 0.3  0.0 - 2.0 % Final   • Neutrophils, Absolute 07/10/2017 4.20  2.00 - 8.60 10*3/mm3 Final   • Lymphocytes, Absolute 07/10/2017 1.69  0.60 - 4.20 10*3/mm3 Final   • Monocytes, Absolute 07/10/2017 0.64  0.00 - 0.90 10*3/mm3 Final   • Eosinophils, Absolute 07/10/2017 0.31  0.00 - 0.70 10*3/mm3 Final   • Basophils, Absolute 07/10/2017 0.02  0.00 - 0.20 10*3/mm3 Final   • Glucose 07/10/2017 121* 70 - 100 mg/dL Final   • BUN 07/10/2017 33* 8 - 25 mg/dL Final   • Creatinine 07/10/2017 1.90* 0.40 - 1.30 mg/dL Final   • Sodium 07/10/2017 142  134 - 146 mmol/L Final   • Potassium 07/10/2017 4.6  3.4 - 5.4 mmol/L Final   • Chloride 07/10/2017 105  100 - 112 mmol/L Final   • CO2 07/10/2017 27.0  20.0 - 32.0 mmol/L Final   • Calcium 07/10/2017 9.8  8.4 - 10.8 mg/dL Final   • Total Protein 07/10/2017 7.0  6.7 - 8.2 g/dL Final   • Albumin 07/10/2017 4.10  3.20 - 5.50 g/dL Final   • ALT (SGPT) 07/10/2017 17  10 - 60 U/L Final   • AST (SGOT) 07/10/2017 17  10 - 60 U/L Final   • Alkaline Phosphatase 07/10/2017 59  15 - 121 U/L Final   • Total Bilirubin 07/10/2017 0.5  0.2 - 1.0 mg/dL Final   • eGFR Non African Amer 07/10/2017 34* 42 - 98 mL/min/1.73 Final   • Globulin 07/10/2017 2.9  2.5 - 4.6 gm/dL Final   • A/G Ratio 07/10/2017 1.4  1.0 - 3.0 g/dL Final   • BUN/Creatinine Ratio 07/10/2017 17.4  7.0 - 25.0 Final   • Anion Gap 07/10/2017 10.0  5.0 - 15.0 mmol/L  Final   • Hemoglobin A1C 07/10/2017 6.12* 4 - 5.6 % Final   • LDL Cholesterol  07/10/2017 112  0 - 129 mg/dL Final   • PSA 07/10/2017 0.103  0.000 - 4.000 ng/mL Final   ]

## 2017-08-04 RX ORDER — TRIAMTERENE AND HYDROCHLOROTHIAZIDE 37.5; 25 MG/1; MG/1
TABLET ORAL
Qty: 30 TABLET | Refills: 11 | Status: SHIPPED | OUTPATIENT
Start: 2017-08-04 | End: 2018-07-31 | Stop reason: SDUPTHER

## 2017-08-15 RX ORDER — ALLOPURINOL 100 MG/1
TABLET ORAL
Qty: 30 TABLET | Refills: 11 | Status: SHIPPED | OUTPATIENT
Start: 2017-08-15 | End: 2018-09-05 | Stop reason: SDUPTHER

## 2017-08-24 ENCOUNTER — OFFICE VISIT (OUTPATIENT)
Dept: OTOLARYNGOLOGY | Facility: CLINIC | Age: 77
End: 2017-08-24

## 2017-08-24 VITALS — TEMPERATURE: 97 F | BODY MASS INDEX: 41.44 KG/M2 | HEIGHT: 67 IN | WEIGHT: 264 LBS

## 2017-08-24 DIAGNOSIS — J31.0 CHRONIC RHINITIS: ICD-10-CM

## 2017-08-24 DIAGNOSIS — G47.33 OBSTRUCTIVE SLEEP APNEA SYNDROME: Primary | Chronic | ICD-10-CM

## 2017-08-24 PROCEDURE — 99213 OFFICE O/P EST LOW 20 MIN: CPT | Performed by: OTOLARYNGOLOGY

## 2017-08-24 RX ORDER — FLUTICASONE PROPIONATE 50 MCG
2 SPRAY, SUSPENSION (ML) NASAL DAILY
COMMUNITY
End: 2018-09-24 | Stop reason: SDUPTHER

## 2017-08-24 RX ORDER — TIZANIDINE 4 MG/1
4 TABLET ORAL NIGHTLY PRN
COMMUNITY
End: 2019-07-30

## 2017-08-24 NOTE — PROGRESS NOTES
Subjective   Eleno Savage is a 77 y.o. male.       History of Present Illness     Patient is followed with obstructive sleep apnea syndrome and chronic rhinitis.  Utilize his CPAP.  Brings his compliance data with him and as always this is excellent.  Had 100% usage greater than 4 hours every night.  Average utilization was greater than 8 hours per night.  Average large leak time was only 31 seconds and estimated apnea hypopnea index was 2.  Patient reports he continues to feel well rested after using the CPAP.  He utilizes Flonase and Zyrtec for his nasal symptoms and states these work well.    The following portions of the patient's history were reviewed and updated as appropriate: allergies, current medications, past family history, past medical history, past social history, past surgical history and problem list.     reports that he has quit smoking. He has never used smokeless tobacco. He reports that he does not drink alcohol or use illicit drugs.   Patient is not a tobacco user and has not been counseled for use of tobacco products      Review of Systems   Constitutional: Negative for fever.           Objective   Physical Exam    General: Well-developed well-nourished male in no acute distress.  Alert and oriented ×3. Head: Normocephalic. Face: Symmetrical strength and appearance. PERRL. EOMI. Voice:Strong. Speech:Fluent  Ears: External ears no deformity, canals no discharge, tympanic membranes intact clear and mobile bilaterally.  Nose: Nares show no discharge mass polyp or purulence.  Boggy mucosa is present.  No gross external deformity.  Septum: Midline  Oral cavity: Lips and gums without lesions.  Tongue and floor of mouth without lesions.  Parotid and submandibular ducts unobstructed.  No mucosal lesions on the buccal mucosa or vestibule of the mouth.  Pharynx: No erythema exudate mass or ulcer  Neck: No lymphadenopathy.  No thyromegaly.  Trachea and larynx midline.  No masses in the parotid or  submandibular glands.    Assessment/Plan   Eleno was seen today for follow-up.    Diagnoses and all orders for this visit:    Obstructive sleep apnea syndrome    Chronic rhinitis        Plan: Continue CPAP usage nightly.  Continue Flonase and Zyrtec for his rhinitis.  Return in 1 year call sooner for problems.

## 2017-09-13 ENCOUNTER — TRANSCRIBE ORDERS (OUTPATIENT)
Dept: LAB | Facility: OTHER | Age: 77
End: 2017-09-13

## 2017-09-13 DIAGNOSIS — N18.30 CHRONIC KIDNEY DISEASE, STAGE III (MODERATE) (HCC): Primary | ICD-10-CM

## 2017-10-17 RX ORDER — CETIRIZINE HYDROCHLORIDE 10 MG/1
10 TABLET ORAL DAILY PRN
Qty: 30 TABLET | Refills: 11 | Status: SHIPPED | OUTPATIENT
Start: 2017-10-17 | End: 2018-01-19 | Stop reason: CLARIF

## 2017-10-31 RX ORDER — OMEPRAZOLE 40 MG/1
CAPSULE, DELAYED RELEASE ORAL
Qty: 60 CAPSULE | Refills: 11 | Status: SHIPPED | OUTPATIENT
Start: 2017-10-31 | End: 2018-10-08 | Stop reason: SDUPTHER

## 2017-11-16 RX ORDER — TRAZODONE HYDROCHLORIDE 150 MG/1
TABLET ORAL
Qty: 30 TABLET | Refills: 11 | OUTPATIENT
Start: 2017-11-16

## 2017-11-16 RX ORDER — BUSPIRONE HYDROCHLORIDE 10 MG/1
TABLET ORAL
Qty: 60 TABLET | Refills: 11 | Status: SHIPPED | OUTPATIENT
Start: 2017-11-16 | End: 2018-08-28 | Stop reason: SDUPTHER

## 2017-12-04 RX ORDER — ESZOPICLONE 3 MG/1
TABLET, FILM COATED ORAL
Qty: 30 TABLET | Refills: 5 | OUTPATIENT
Start: 2017-12-04

## 2017-12-13 RX ORDER — TRAZODONE HYDROCHLORIDE 150 MG/1
TABLET ORAL
Qty: 30 TABLET | Refills: 11 | OUTPATIENT
Start: 2017-12-13

## 2017-12-18 RX ORDER — TRAZODONE HYDROCHLORIDE 150 MG/1
TABLET ORAL
Qty: 30 TABLET | Refills: 11 | Status: SHIPPED | OUTPATIENT
Start: 2017-12-18 | End: 2018-08-28 | Stop reason: SDUPTHER

## 2017-12-29 RX ORDER — ESZOPICLONE 3 MG/1
TABLET, FILM COATED ORAL
Qty: 30 TABLET | Refills: 0 | Status: SHIPPED | OUTPATIENT
Start: 2017-12-29 | End: 2018-01-19 | Stop reason: SDUPTHER

## 2018-01-04 RX ORDER — TAMSULOSIN HYDROCHLORIDE 0.4 MG/1
CAPSULE ORAL
Qty: 30 CAPSULE | Refills: 5 | Status: SHIPPED | OUTPATIENT
Start: 2018-01-04 | End: 2018-07-03 | Stop reason: SDUPTHER

## 2018-01-15 ENCOUNTER — LAB (OUTPATIENT)
Dept: LAB | Facility: OTHER | Age: 78
End: 2018-01-15

## 2018-01-15 DIAGNOSIS — N18.30 TYPE 2 DIABETES MELLITUS WITH STAGE 3 CHRONIC KIDNEY DISEASE, WITHOUT LONG-TERM CURRENT USE OF INSULIN (HCC): Chronic | ICD-10-CM

## 2018-01-15 DIAGNOSIS — E11.22 TYPE 2 DIABETES MELLITUS WITH STAGE 3 CHRONIC KIDNEY DISEASE, WITHOUT LONG-TERM CURRENT USE OF INSULIN (HCC): Chronic | ICD-10-CM

## 2018-01-15 DIAGNOSIS — E79.0 HYPERURICEMIA: Chronic | ICD-10-CM

## 2018-01-15 DIAGNOSIS — N18.30 CHRONIC KIDNEY DISEASE, STAGE III (MODERATE) (HCC): ICD-10-CM

## 2018-01-15 DIAGNOSIS — E55.9 VITAMIN D DEFICIENCY: Chronic | ICD-10-CM

## 2018-01-15 LAB
25(OH)D3 SERPL-MCNC: 51.9 NG/ML (ref 30–100)
ALBUMIN SERPL-MCNC: 3.8 G/DL (ref 3.2–5.5)
ALBUMIN/GLOB SERPL: 1.1 G/DL (ref 1–3)
ALP SERPL-CCNC: 78 U/L (ref 15–121)
ALT SERPL W P-5'-P-CCNC: 16 U/L (ref 10–60)
ANION GAP SERPL CALCULATED.3IONS-SCNC: 9 MMOL/L (ref 5–15)
AST SERPL-CCNC: 19 U/L (ref 10–60)
BASOPHILS # BLD AUTO: 0.03 10*3/MM3 (ref 0–0.2)
BASOPHILS NFR BLD AUTO: 0.4 % (ref 0–2)
BILIRUB SERPL-MCNC: 0.7 MG/DL (ref 0.2–1)
BUN BLD-MCNC: 16 MG/DL (ref 8–25)
BUN/CREAT SERPL: 12.3 (ref 7–25)
CALCIUM SPEC-SCNC: 9.1 MG/DL (ref 8.4–10.8)
CHLORIDE SERPL-SCNC: 103 MMOL/L (ref 100–112)
CO2 SERPL-SCNC: 27 MMOL/L (ref 20–32)
CREAT BLD-MCNC: 1.3 MG/DL (ref 0.4–1.3)
DEPRECATED RDW RBC AUTO: 46.4 FL (ref 35.1–43.9)
EOSINOPHIL # BLD AUTO: 0.26 10*3/MM3 (ref 0–0.7)
EOSINOPHIL NFR BLD AUTO: 3.3 % (ref 0–7)
ERYTHROCYTE [DISTWIDTH] IN BLOOD BY AUTOMATED COUNT: 14.7 % (ref 11.5–14.5)
GFR SERPL CREATININE-BSD FRML MDRD: 53 ML/MIN/1.73 (ref 42–98)
GLOBULIN UR ELPH-MCNC: 3.4 GM/DL (ref 2.5–4.6)
GLUCOSE BLD-MCNC: 113 MG/DL (ref 70–100)
HBA1C MFR BLD: 6.1 % (ref 4–5.6)
HCT VFR BLD AUTO: 43.2 % (ref 39–49)
HGB BLD-MCNC: 14.6 G/DL (ref 13.7–17.3)
LYMPHOCYTES # BLD AUTO: 1.73 10*3/MM3 (ref 0.6–4.2)
LYMPHOCYTES NFR BLD AUTO: 21.8 % (ref 10–50)
MCH RBC QN AUTO: 29.6 PG (ref 26.5–34)
MCHC RBC AUTO-ENTMCNC: 33.8 G/DL (ref 31.5–36.3)
MCV RBC AUTO: 87.4 FL (ref 80–98)
MONOCYTES # BLD AUTO: 0.68 10*3/MM3 (ref 0–0.9)
MONOCYTES NFR BLD AUTO: 8.6 % (ref 0–12)
NEUTROPHILS # BLD AUTO: 5.24 10*3/MM3 (ref 2–8.6)
NEUTROPHILS NFR BLD AUTO: 65.9 % (ref 37–80)
PLATELET # BLD AUTO: 203 10*3/MM3 (ref 150–450)
PMV BLD AUTO: 11.8 FL (ref 8–12)
POTASSIUM BLD-SCNC: 3.5 MMOL/L (ref 3.4–5.4)
PROT SERPL-MCNC: 7.2 G/DL (ref 6.7–8.2)
RBC # BLD AUTO: 4.94 10*6/MM3 (ref 4.37–5.74)
SODIUM BLD-SCNC: 139 MMOL/L (ref 134–146)
URATE SERPL-MCNC: 6.5 MG/DL (ref 2.6–7.2)
WBC NRBC COR # BLD: 7.94 10*3/MM3 (ref 3.2–9.8)

## 2018-01-15 PROCEDURE — 84550 ASSAY OF BLOOD/URIC ACID: CPT | Performed by: INTERNAL MEDICINE

## 2018-01-15 PROCEDURE — 82306 VITAMIN D 25 HYDROXY: CPT | Performed by: INTERNAL MEDICINE

## 2018-01-15 PROCEDURE — 80053 COMPREHEN METABOLIC PANEL: CPT | Performed by: INTERNAL MEDICINE

## 2018-01-15 PROCEDURE — 83036 HEMOGLOBIN GLYCOSYLATED A1C: CPT | Performed by: INTERNAL MEDICINE

## 2018-01-15 PROCEDURE — 36415 COLL VENOUS BLD VENIPUNCTURE: CPT | Performed by: INTERNAL MEDICINE

## 2018-01-15 PROCEDURE — 85025 COMPLETE CBC W/AUTO DIFF WBC: CPT | Performed by: INTERNAL MEDICINE

## 2018-01-19 ENCOUNTER — OFFICE VISIT (OUTPATIENT)
Dept: FAMILY MEDICINE CLINIC | Facility: CLINIC | Age: 78
End: 2018-01-19

## 2018-01-19 VITALS
HEART RATE: 60 BPM | DIASTOLIC BLOOD PRESSURE: 60 MMHG | BODY MASS INDEX: 41.75 KG/M2 | WEIGHT: 266 LBS | TEMPERATURE: 98.1 F | SYSTOLIC BLOOD PRESSURE: 122 MMHG | HEIGHT: 67 IN

## 2018-01-19 DIAGNOSIS — M54.16 LUMBAR RADICULOPATHY: Chronic | ICD-10-CM

## 2018-01-19 DIAGNOSIS — N40.1 BENIGN PROSTATIC HYPERPLASIA WITH NOCTURIA: Chronic | ICD-10-CM

## 2018-01-19 DIAGNOSIS — IMO0001 CLASS 3 OBESITY DUE TO EXCESS CALORIES WITH SERIOUS COMORBIDITY AND BODY MASS INDEX (BMI) OF 40.0 TO 44.9 IN ADULT: Chronic | ICD-10-CM

## 2018-01-19 DIAGNOSIS — E55.9 VITAMIN D DEFICIENCY: Chronic | ICD-10-CM

## 2018-01-19 DIAGNOSIS — I25.10 CORONARY ARTERIOSCLEROSIS: Chronic | ICD-10-CM

## 2018-01-19 DIAGNOSIS — E11.22 TYPE 2 DIABETES MELLITUS WITH STAGE 2 CHRONIC KIDNEY DISEASE, WITHOUT LONG-TERM CURRENT USE OF INSULIN (HCC): Primary | Chronic | ICD-10-CM

## 2018-01-19 DIAGNOSIS — Z12.5 SPECIAL SCREENING FOR MALIGNANT NEOPLASM OF PROSTATE: ICD-10-CM

## 2018-01-19 DIAGNOSIS — E78.2 MIXED HYPERLIPIDEMIA: Chronic | ICD-10-CM

## 2018-01-19 DIAGNOSIS — N18.2 TYPE 2 DIABETES MELLITUS WITH STAGE 2 CHRONIC KIDNEY DISEASE, WITHOUT LONG-TERM CURRENT USE OF INSULIN (HCC): Primary | Chronic | ICD-10-CM

## 2018-01-19 DIAGNOSIS — N18.30 CHRONIC KIDNEY DISEASE, STAGE 3 (HCC): Chronic | ICD-10-CM

## 2018-01-19 DIAGNOSIS — E78.1 HYPERTRIGLYCERIDEMIA: Chronic | ICD-10-CM

## 2018-01-19 DIAGNOSIS — G47.33 OBSTRUCTIVE SLEEP APNEA SYNDROME: Chronic | ICD-10-CM

## 2018-01-19 DIAGNOSIS — E79.0 HYPERURICEMIA: Chronic | ICD-10-CM

## 2018-01-19 DIAGNOSIS — M10.30 GOUT DUE TO RENAL IMPAIRMENT, UNSPECIFIED CHRONICITY, UNSPECIFIED SITE: Chronic | ICD-10-CM

## 2018-01-19 DIAGNOSIS — J43.2 CENTRILOBULAR EMPHYSEMA (HCC): Chronic | ICD-10-CM

## 2018-01-19 DIAGNOSIS — R35.1 BENIGN PROSTATIC HYPERPLASIA WITH NOCTURIA: Chronic | ICD-10-CM

## 2018-01-19 PROCEDURE — 99214 OFFICE O/P EST MOD 30 MIN: CPT | Performed by: INTERNAL MEDICINE

## 2018-01-19 RX ORDER — ESZOPICLONE 3 MG/1
TABLET, FILM COATED ORAL
Qty: 30 TABLET | Refills: 2 | Status: SHIPPED | OUTPATIENT
Start: 2018-01-19 | End: 2018-04-30 | Stop reason: SDUPTHER

## 2018-01-19 RX ORDER — GABAPENTIN 600 MG/1
TABLET ORAL
Qty: 30 TABLET | Refills: 5 | Status: SHIPPED | OUTPATIENT
Start: 2018-01-19 | End: 2018-07-30 | Stop reason: SDUPTHER

## 2018-01-19 NOTE — PROGRESS NOTES
Subjective       History of Present Illness     Eleno Savage is a 78 y.o. male who presents for 6-month follow up on type 2 diabetes mellitus, stage 2 chronic kidney disease, obesity, COPD, GERD, vitamin D deficiency, and high cholesterol/high triglycerides.  Dr. Mesa is his cardiologist following his coronary artery disease.  He follows with Dr. Weathers, nephrologist annually for CKD and has had a significant improvement in renal function over the past few months.  He continues to be compliant with CPAP for sleep apnea symptoms.  GERD symptoms are currently adequately controlled.  He reports nocturia 1-2 times nightly.  He denies recent gout attacks.  GERD symptoms are currently adequately controlled.  His diabetes is excellently controlled.        Six months ago, I had him stop fenofibrate and start Lipitor 20 mg q.o.d. Due to LDL above goal and stage 3 chronic kidney disease.  His creatinine is improved going from 1.9 down to 1.3 with this set of labs.       He reports a lot of chronic postnasal drainage despite using the Flonase nasal spray.  He denies other URI symptoms.     Weight is up 2 pounds in the past six months.  Blood pressure is at goal today.  He reports it is normally at goal at home as well with some occasional diastolic pressures in the 50s.  He denies orthostatic symptoms.   He is on Imdur prescribed for cardiology.  He reports palpitations at times.  He has mentioned this to Dr. Mesa.  I recommended he notify him if frequency or intensify of palpitations increase.      The patient's relevant past medical, surgical, and social history was reviewed in Epic.   Lab results are reviewed with the patient today.  CBC unremarkable.  A1c is stable at 6.1.   Vitamin D at goal with current oral supplement.  Renal function has improved to 1.3 decreased from 1.9 six months ago.  Liver function normal.      Review of Systems   Constitutional: Negative for chills, fatigue and fever.   HENT: Negative for  "congestion, ear pain, postnasal drip, sinus pressure and sore throat.    Respiratory: Negative for cough, shortness of breath and wheezing.    Cardiovascular: Negative for chest pain, palpitations and leg swelling.   Gastrointestinal: Negative for abdominal pain, blood in stool, constipation, diarrhea, nausea and vomiting.   Endocrine: Negative for cold intolerance, heat intolerance, polydipsia and polyuria.   Genitourinary: Negative for dysuria, frequency, hematuria and urgency.   Skin: Negative for rash.   Neurological: Negative for syncope and weakness.        Objective     Vitals:    01/19/18 0923   BP: 122/60   Pulse: 60   Temp: 98.1 °F (36.7 °C)   TempSrc: Oral   Weight: 121 kg (266 lb)   Height: 170.2 cm (67\")   .  Physical Exam   Constitutional: He is oriented to person, place, and time. He appears well-developed and well-nourished. No distress.   Obese male.    HENT:   Head: Normocephalic and atraumatic.   Nose: Right sinus exhibits no maxillary sinus tenderness and no frontal sinus tenderness. Left sinus exhibits no maxillary sinus tenderness and no frontal sinus tenderness.   Mouth/Throat: Uvula is midline, oropharynx is clear and moist and mucous membranes are normal. No oral lesions. No tonsillar exudate.   Clear postnasal drip.    Eyes: Conjunctivae and EOM are normal. Pupils are equal, round, and reactive to light.   Neck: Trachea normal. Neck supple. No JVD present. Carotid bruit is not present. No tracheal deviation present. No thyroid mass and no thyromegaly present.   Cardiovascular: Normal rate, regular rhythm and normal heart sounds.   No extrasystoles are present. PMI is not displaced.    No murmur heard.  Pulmonary/Chest: Effort normal and breath sounds normal. No accessory muscle usage. No respiratory distress. He has no decreased breath sounds. He has no wheezes. He has no rhonchi. He has no rales.   Abdominal: Soft. Bowel sounds are normal. He exhibits no distension. There is no " hepatosplenomegaly. There is no tenderness.   Overweight abdomen limits exam.        Vascular Status -  His exam exhibits right foot edema (1+ edema bilaterally consistent with chronic venous insufficiency). His exam exhibits right foot vasculature abnormal. His exam exhibits left foot edema. His exam exhibits left foot vasculature abnormal.  Lymphadenopathy:     He has no cervical adenopathy.   Neurological: He is alert and oriented to person, place, and time. No cranial nerve deficit. Coordination normal.   Skin: Skin is warm, dry and intact. No rash noted. No cyanosis. Nails show no clubbing.   Psychiatric: He has a normal mood and affect. His speech is normal and behavior is normal. Thought content normal.   Vitals reviewed.        Assessment/Plan      He is given a refill on his Gabapentin 600 mg to take 1/2 to 1 every night.  Patient understands the risks associated with this controlled medication, including tolerance and addiction.  He also agrees to only obtain this medication from me, and not from a another provider, unless that provider is covering for me in my absence.  He also agrees to be compliant in dosing, and not self adjust the dose of medication.  A signed controlled substance agreement is on file, and he has received a controlled substance education sheet at this a previous visit.  He has also signed a consent for treatment with a controlled substance as per [facility] policy. NEFTALY was obtained.    I recommended he notify Dr. Mesa, cardiologist, if he has increase in frequency or intensify of palpitations.  He has mentioned this to him before.     His renal function is significantly improved with this set of labs.  Continue to avoid NSAIDs and other chronic nephrotoxic medications.      His morbid obesity is producing multiple medical and musculoskeletal issues and I continue to encourage him in diet, exercise, and weight loss efforts.     Continue other medications and vitamin and  mineral supplements to treat additional medical problems which we addressed today.  He will return in six months for follow up with fasting labs one week prior.   Intensify diet, exercise, and weight loss efforts.  Written literature regarding weight loss and exercise included in AVS today.      Scribed for Dr. Israel by Lacy Bustos [facility].           Diagnoses and all orders for this visit:    Type 2 diabetes mellitus with stage 2 chronic kidney disease, without long-term current use of insulin  -     Vitamin D 25 Hydroxy; Future  -     CBC Auto Differential; Future  -     Comprehensive Metabolic Panel; Future  -     Hemoglobin A1c; Future  -     Lipid Panel; Future    Hypertriglyceridemia    Mixed hyperlipidemia  -     Lipid Panel; Future    Coronary arteriosclerosis - followed by Dr. 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, unspecified chronicity, unspecified site  -     Uric Acid; Future    Special screening for malignant neoplasm of prostate  -     PSA Screen; Future    Lumbar radiculopathy    Other orders  -     eszopiclone (LUNESTA) 3 MG tablet; Take immediately before bedtime  -     gabapentin (NEURONTIN) 600 MG tablet; 1 every night      Lab on 01/15/2018   Component Date Value Ref Range Status   • 25 Hydroxy, Vitamin D 01/15/2018 51.9  30.0 - 100.0 ng/ml Final   • Uric Acid 01/15/2018 6.5  2.6 - 7.2 mg/dL Final   • WBC 01/15/2018 7.94  3.20 - 9.80 10*3/mm3 Final   • RBC 01/15/2018 4.94  4.37 - 5.74 10*6/mm3 Final   • Hemoglobin 01/15/2018 14.6  13.7 - 17.3 g/dL Final   • Hematocrit 01/15/2018 43.2  39.0 - 49.0 % Final   • MCV 01/15/2018 87.4   80.0 - 98.0 fL Final   • MCH 01/15/2018 29.6  26.5 - 34.0 pg Final   • MCHC 01/15/2018 33.8  31.5 - 36.3 g/dL Final   • RDW 01/15/2018 14.7* 11.5 - 14.5 % Final   • RDW-SD 01/15/2018 46.4* 35.1 - 43.9 fl Final   • MPV 01/15/2018 11.8  8.0 - 12.0 fL Final   • Platelets 01/15/2018 203  150 - 450 10*3/mm3 Final   • Neutrophil % 01/15/2018 65.9  37.0 - 80.0 % Final   • Lymphocyte % 01/15/2018 21.8  10.0 - 50.0 % Final   • Monocyte % 01/15/2018 8.6  0.0 - 12.0 % Final   • Eosinophil % 01/15/2018 3.3  0.0 - 7.0 % Final   • Basophil % 01/15/2018 0.4  0.0 - 2.0 % Final   • Neutrophils, Absolute 01/15/2018 5.24  2.00 - 8.60 10*3/mm3 Final   • Lymphocytes, Absolute 01/15/2018 1.73  0.60 - 4.20 10*3/mm3 Final   • Monocytes, Absolute 01/15/2018 0.68  0.00 - 0.90 10*3/mm3 Final   • Eosinophils, Absolute 01/15/2018 0.26  0.00 - 0.70 10*3/mm3 Final   • Basophils, Absolute 01/15/2018 0.03  0.00 - 0.20 10*3/mm3 Final   • Glucose 01/15/2018 113* 70 - 100 mg/dL Final   • BUN 01/15/2018 16  8 - 25 mg/dL Final   • Creatinine 01/15/2018 1.30  0.40 - 1.30 mg/dL Final   • Sodium 01/15/2018 139  134 - 146 mmol/L Final   • Potassium 01/15/2018 3.5  3.4 - 5.4 mmol/L Final   • Chloride 01/15/2018 103  100 - 112 mmol/L Final   • CO2 01/15/2018 27.0  20.0 - 32.0 mmol/L Final   • Calcium 01/15/2018 9.1  8.4 - 10.8 mg/dL Final   • Total Protein 01/15/2018 7.2  6.7 - 8.2 g/dL Final   • Albumin 01/15/2018 3.80  3.20 - 5.50 g/dL Final   • ALT (SGPT) 01/15/2018 16  10 - 60 U/L Final   • AST (SGOT) 01/15/2018 19  10 - 60 U/L Final   • Alkaline Phosphatase 01/15/2018 78  15 - 121 U/L Final   • Total Bilirubin 01/15/2018 0.7  0.2 - 1.0 mg/dL Final   • eGFR Non African Amer 01/15/2018 53  42 - 98 mL/min/1.73 Final   • Globulin 01/15/2018 3.4  2.5 - 4.6 gm/dL Final   • A/G Ratio 01/15/2018 1.1  1.0 - 3.0 g/dL Final   • BUN/Creatinine Ratio 01/15/2018 12.3  7.0 - 25.0 Final   • Anion Gap 01/15/2018 9.0  5.0 - 15.0 mmol/L Final   • Hemoglobin A1C 01/15/2018  6.1* 4 - 5.6 % Final   ]

## 2018-02-14 RX ORDER — GABAPENTIN 600 MG/1
TABLET ORAL
Qty: 30 TABLET | Refills: 5 | OUTPATIENT
Start: 2018-02-14

## 2018-02-26 ENCOUNTER — TRANSCRIBE ORDERS (OUTPATIENT)
Dept: LAB | Facility: OTHER | Age: 78
End: 2018-02-26

## 2018-02-26 DIAGNOSIS — N18.30 CHRONIC KIDNEY DISEASE, STAGE III (MODERATE) (HCC): Primary | ICD-10-CM

## 2018-02-26 RX ORDER — FLUTICASONE PROPIONATE 50 MCG
SPRAY, SUSPENSION (ML) NASAL
Qty: 16 G | Refills: 5 | Status: SHIPPED | OUTPATIENT
Start: 2018-02-26

## 2018-03-28 ENCOUNTER — TRANSCRIBE ORDERS (OUTPATIENT)
Dept: LAB | Facility: OTHER | Age: 78
End: 2018-03-28

## 2018-03-28 DIAGNOSIS — N18.30 CHRONIC KIDNEY DISEASE, STAGE III (MODERATE) (HCC): Primary | ICD-10-CM

## 2018-04-09 ENCOUNTER — LAB (OUTPATIENT)
Dept: LAB | Facility: OTHER | Age: 78
End: 2018-04-09

## 2018-04-09 DIAGNOSIS — N18.30 CHRONIC KIDNEY DISEASE, STAGE III (MODERATE) (HCC): ICD-10-CM

## 2018-04-09 LAB
ALBUMIN SERPL-MCNC: 4.1 G/DL (ref 3.2–5.5)
ANION GAP SERPL CALCULATED.3IONS-SCNC: 8 MMOL/L (ref 5–15)
BUN BLD-MCNC: 22 MG/DL (ref 8–25)
BUN/CREAT SERPL: 16.9 (ref 7–25)
CALCIUM SPEC-SCNC: 9.4 MG/DL (ref 8.4–10.8)
CHLORIDE SERPL-SCNC: 103 MMOL/L (ref 100–112)
CO2 SERPL-SCNC: 26 MMOL/L (ref 20–32)
CREAT BLD-MCNC: 1.3 MG/DL (ref 0.4–1.3)
CREAT UR-MCNC: 56.7 MG/DL
GFR SERPL CREATININE-BSD FRML MDRD: 53 ML/MIN/1.73 (ref 42–98)
GLUCOSE BLD-MCNC: 128 MG/DL (ref 70–100)
PHOSPHATE SERPL-MCNC: 2.6 MG/DL (ref 2.5–4.6)
POTASSIUM BLD-SCNC: 3.9 MMOL/L (ref 3.4–5.4)
PROT UR-MCNC: 8.7 MG/DL
PROT/CREAT UR: 153.4 MG/G CREA (ref 0–200)
SODIUM BLD-SCNC: 137 MMOL/L (ref 134–146)

## 2018-04-09 PROCEDURE — 84156 ASSAY OF PROTEIN URINE: CPT | Performed by: INTERNAL MEDICINE

## 2018-04-09 PROCEDURE — 80069 RENAL FUNCTION PANEL: CPT | Performed by: INTERNAL MEDICINE

## 2018-04-09 PROCEDURE — 36415 COLL VENOUS BLD VENIPUNCTURE: CPT | Performed by: INTERNAL MEDICINE

## 2018-04-09 PROCEDURE — 82570 ASSAY OF URINE CREATININE: CPT | Performed by: INTERNAL MEDICINE

## 2018-04-17 ENCOUNTER — TRANSCRIBE ORDERS (OUTPATIENT)
Dept: LAB | Facility: OTHER | Age: 78
End: 2018-04-17

## 2018-04-17 DIAGNOSIS — N18.30 CHRONIC KIDNEY DISEASE, STAGE III (MODERATE) (HCC): Primary | ICD-10-CM

## 2018-04-25 RX ORDER — CARVEDILOL 3.12 MG/1
TABLET ORAL
Qty: 60 TABLET | Refills: 11 | Status: SHIPPED | OUTPATIENT
Start: 2018-04-25 | End: 2019-06-03 | Stop reason: SDUPTHER

## 2018-04-30 RX ORDER — AMLODIPINE BESYLATE AND BENAZEPRIL HYDROCHLORIDE 5; 20 MG/1; MG/1
CAPSULE ORAL
Qty: 60 CAPSULE | Refills: 11 | Status: SHIPPED | OUTPATIENT
Start: 2018-04-30 | End: 2018-08-28 | Stop reason: SDUPTHER

## 2018-04-30 RX ORDER — ESZOPICLONE 3 MG/1
TABLET, FILM COATED ORAL
Qty: 30 TABLET | Refills: 2 | Status: SHIPPED | OUTPATIENT
Start: 2018-04-30 | End: 2018-07-30 | Stop reason: SDUPTHER

## 2018-06-13 RX ORDER — TOLTERODINE 4 MG/1
4 CAPSULE, EXTENDED RELEASE ORAL NIGHTLY
Qty: 30 CAPSULE | Refills: 11 | Status: SHIPPED | OUTPATIENT
Start: 2018-06-13 | End: 2018-08-28 | Stop reason: SDUPTHER

## 2018-06-16 NOTE — TELEPHONE ENCOUNTER
[facility]ill [employer]. [facility]   Upper Respiratory Infection in Children   WHAT YOU NEED TO KNOW:   An upper respiratory infection is also called a cold  It can affect your child's nose, throat, ears, and sinuses  The common cold is usually not serious and does not need special treatment  A cold is caused by a virus and will not get better with antibiotics  Most children get about 5 to 8 colds each year  Your child's cold symptoms will be worst for the first 3 to 5 days  His or her cold should be gone in 7 to 14 days  Your child may continue to cough for 2 to 3 weeks  DISCHARGE INSTRUCTIONS:   Return to the emergency department if:   · Your child's temperature reaches 105°F (40 6°C)  · Your child has trouble breathing or is breathing faster than usual      · Your child's lips or nails turn blue  · Your child's nostrils flare when he or she takes a breath  · The skin above or below your child's ribs is sucked in with each breath  · Your child's heart is beating much faster than usual      · You see pinpoint or larger reddish-purple dots on your child's skin  · Your child stops urinating or urinates less than usual      · Your baby's soft spot on his or her head is bulging outward or sunken inward  · Your child has a severe headache or stiff neck  · Your child has chest or stomach pain  · Your baby is too weak to eat  Contact your child's healthcare provider if:   · Your child has a rectal, ear, or forehead temperature higher than 100 4°F (38°C)  · Your child has an oral or pacifier temperature higher than 100°F (37 8°C)  · Your child has an armpit temperature higher than 99°F (37 2°C)  · Your child is younger than 2 years and has a fever for more than 24 hours  · Your child is 2 years or older and has a fever for more than 72 hours  · Your child has had thick nasal drainage for more than 2 days  · Your child has ear pain  · Your child has white spots on his or her tonsils  · Your child coughs up a lot of thick, yellow, or green mucus  · Your child is unable to eat, has nausea, or is vomiting  · Your child has increased tiredness and weakness  · Your child's symptoms do not improve or get worse within 3 days  · You have questions or concerns about your child's condition or care  Medicines:  Do not give over-the-counter cough or cold medicines to children younger than 4 years  Your healthcare provider may tell you not to give these medicines to children younger than 6 years  OTC cough and cold medicines can cause side effects that may harm your child  Your child may need any of the following:  · Decongestants  help reduce nasal congestion in older children and help make breathing easier  If your child takes decongestant pills, they may make him or her feel restless or cause problems with sleep  Do not give your child decongestant sprays for more than a few days  · Cough suppressants  help reduce coughing in older children  Ask your child's healthcare provider which type of cough medicine is best for him or her  · Acetaminophen  decreases pain and fever  It is available without a doctor's order  Ask how much to give your child and how often to give it  Follow directions  Read the labels of all other medicines your child uses to see if they also contain acetaminophen, or ask your child's doctor or pharmacist  Acetaminophen can cause liver damage if not taken correctly  · NSAIDs , such as ibuprofen, help decrease swelling, pain, and fever  This medicine is available with or without a doctor's order  NSAIDs can cause stomach bleeding or kidney problems in certain people  If you take blood thinner medicine, always ask if NSAIDs are safe for you  Always read the medicine label and follow directions  Do not give these medicines to children under 10months of age without direction from your child's healthcare provider       · Do not give aspirin to children under 25years of age  Your child could develop Reye syndrome if he takes aspirin  Reye syndrome can cause life-threatening brain and liver damage  Check your child's medicine labels for aspirin, salicylates, or oil of wintergreen  · Give your child's medicine as directed  Contact your child's healthcare provider if you think the medicine is not working as expected  Tell him or her if your child is allergic to any medicine  Keep a current list of the medicines, vitamins, and herbs your child takes  Include the amounts, and when, how, and why they are taken  Bring the list or the medicines in their containers to follow-up visits  Carry your child's medicine list with you in case of an emergency  Follow up with your child's healthcare provider as directed:  Write down your questions so you remember to ask them during your child's visits  Care for your child:   · Have your child rest   Rest will help his or her body get better  · Give your child more liquids as directed  Liquids will help thin and loosen mucus so your child can cough it up  Liquids will also help prevent dehydration  Liquids that help prevent dehydration include water, fruit juice, and broth  Do not give your child liquids that contain caffeine  Caffeine can increase your child's risk for dehydration  Ask your child's healthcare provider how much liquid to give your child each day  · Clear mucus from your child's nose  Use a bulb syringe to remove mucus from a baby's nose  Squeeze the bulb and put the tip into one of your baby's nostrils  Gently close the other nostril with your finger  Slowly release the bulb to suck up the mucus  Empty the bulb syringe onto a tissue  Repeat the steps if needed  Do the same thing in the other nostril  Make sure your baby's nose is clear before he or she feeds or sleeps  Your child's healthcare provider may recommend you put saline drops into your baby's nose if the mucus is very thick             · Soothe your child's throat  If your child is 8 years or older, have him or her gargle with salt water  Make salt water by dissolving ¼ teaspoon salt in 1 cup warm water  · Soothe your child's cough  You can give honey to children older than 1 year  Give ½ teaspoon of honey to children 1 to 5 years  Give 1 teaspoon of honey to children 6 to 11 years  Give 2 teaspoons of honey to children 12 or older  · Use a cool-mist humidifier  This will add moisture to the air and help your child breathe easier  Make sure the humidifier is out of your child's reach  · Apply petroleum-based jelly around the outside of your child's nostrils  This can decrease irritation from blowing his or her nose  · Keep your child away from smoke  Do not smoke near your child  Do not let your older child smoke  Nicotine and other chemicals in cigarettes and cigars can make your child's symptoms worse  They can also cause infections such as bronchitis or pneumonia  Ask your child's healthcare provider for information if you or your child currently smoke and need help to quit  E-cigarettes or smokeless tobacco still contain nicotine  Talk to your healthcare provider before you or your child use these products  Prevent the spread of a cold:   · Keep your child away from other people during the first 3 to 5 days of his or her cold  The virus is spread most easily during this time  · Wash your hands and your child's hands often  Teach your child to cover his or her nose and mouth when he or she sneezes, coughs, and blows his or her nose  Show your child how to cough and sneeze into the crook of the elbow instead of the hands  · Do not let your child share toys, pacifiers, or towels with others while he or she is sick  · Do not let your child share foods, eating utensils, cups, or drinks with others while he or she is sick    © 2017 [employer] Information is for End User's use only and may not be sold, redistributed or otherwise used for commercial purposes  All illustrations and images included in CareNotes® are the copyrighted property of [employer]  or Jadon Reeves  The above information is an  only  It is not intended as medical advice for individual conditions or treatments  Talk to your doctor, nurse or pharmacist before following any medical regimen to see if it is safe and effective for you  Acute Nausea and Vomiting in Children   WHAT YOU NEED TO KNOW:   Some children, including babies, vomit for unknown reasons  Some common reasons for vomiting include gastroesophageal reflux or infection of the stomach, intestines, or urinary tract  DISCHARGE INSTRUCTIONS:   Return to the emergency department if:   · Your child has a seizure  · Your child's vomit contains blood or bile (green substance), or it looks like it has coffee grounds in it  · Your child is irritable and has a stiff neck and headache  · Your child has severe abdominal pain  · Your child says it hurts to urinate, or cries when he urinates  · Your child does not have energy, and is hard to wake up  · Your child has signs of dehydration such as a dry mouth, crying without tears, or urinating less than usual   Contact your child's healthcare provider if:   · Your baby has projectile (forceful, shooting) vomiting after a feeding  · Your child's fever increases or does not improve  · Your child begins to vomit more frequently  · Your child cannot keep any fluids down  · Your child's abdomen is hard and bloated  · You have questions or concerns about your child's condition or care  Medicines: Your child may need any of the following:  · Antinausea medicine  calms your child's stomach and controls vomiting  · Give your child's medicine as directed  Contact your child's healthcare provider if you think the medicine is not working as expected   Tell him or her if your child is allergic to any medicine  Keep a current list of the medicines, vitamins, and herbs your child takes  Include the amounts, and when, how, and why they are taken  Bring the list or the medicines in their containers to follow-up visits  Carry your child's medicine list with you in case of an emergency  Follow up with your child's healthcare provider in 1 to 2 days:  Write down your questions so you remember to ask them during your child's visits  Liquids:  Give your child liquids as directed  Ask how much liquid your child should drink each day and which liquids are best  Children under 3year old should continue drinking breast milk and formula  Your child's healthcare provider may recommend a clear liquid diet for children older than 3year old  Examples of clear liquids include water, diluted juice, broth, and gelatin  Oral rehydration solution: An oral rehydration solution, or ORS, contains water, salts, and sugar that are needed to replace lost body fluids  Ask what kind of ORS to use, how much to give your child, and where to get it  © 2017 [facility] Information is for End User's use only and may not be sold, redistributed or otherwise used for commercial purposes  All illustrations and images included in CareNotes® are the copyrighted property of [employer] M [employer]  or Jadon Lala  The above information is an  only  It is not intended as medical advice for individual conditions or treatments  Talk to your doctor, nurse or pharmacist before following any medical regimen to see if it is safe and effective for you  Dehydration in [address] [address]:   Dehydration is a condition that develops when your child's body does not have enough water and fluids  Your child may become dehydrated if he or she does not drink enough water or loses too much fluid  Fluid loss may also cause loss of electrolytes (minerals), such as sodium   Your child's dehydration may be mild to severe  DISCHARGE INSTRUCTIONS:   Return to the emergency department if:   · Your child has a seizure  · Your child's vomit is green or yellow  · Your child seems confused and is not answering you  · Your child is extremely sleepy or you cannot wake him or her  · Your child becomes dizzy or faint when he or she stands  · Your child will not drink or breastfeed at all  · Your child is not drinking the ORS or vomits after he or she drinks it  · Your child is not able to keep food or liquids down  · Your child cries without tears, has very dry lips, or is urinating less than usual      · Your child has cold hands or feet, or his or her face looks pale  Contact your child's healthcare provider if:   · Your child has vomited more than twice in the past 24 hours  · Your child has had more than 5 episodes of diarrhea in the past 24 hours  · Your baby is breastfeeding less or is drinking less formula than usual     · Your child is more irritable, fussy, or tired than usual      · You have questions or concerns about your child's condition or care  Prevent or manage dehydration in your child:   · Offer your child liquids as directed  Ask his or her healthcare provider how much liquid to offer each day and which liquids are best  During sports or exercise, and on warm days, your child needs to drink more often than usual  He or she may need to drink up to 8 ounces (1 cup) of water every 20 minutes  Breastfeed your baby more often, or offer him or her extra formula  · Continue to breastfeed your baby or offer him or her formula even if he or she drinks ORS  Give your child bland foods, such as bananas, rice, apples, or toast  Do not give him or her dairy products or spicy foods until he or she feels better  Do not give him or her soft drinks or fruit juices  These drinks can make his or her condition worse  · Keep your child cool    Limit the time he or she spends outdoors during the hottest part of the day  Dress him or her in lightweight clothes  · Keep track of how often your child urinates  If he or she urinates less than usual or his or her urine is darker, give him or her more liquids  Babies should have 4 to 6 wet diapers each day  Follow up with your child's healthcare provider as directed:  Write down your questions so you remember to ask them during your visits  © 2017 [address] [employer] Information is for End User's use only and may not be sold, redistributed or otherwise used for commercial purposes  All illustrations and images included in CareNotes® are the copyrighted property of [employer]  or Jadon Reeves  The above information is an  only  It is not intended as medical advice for individual conditions or treatments  Talk to your doctor, nurse or pharmacist before following any medical regimen to see if it is safe and effective for you

## 2018-07-03 RX ORDER — TAMSULOSIN HYDROCHLORIDE 0.4 MG/1
CAPSULE ORAL
Qty: 30 CAPSULE | Refills: 5 | Status: SHIPPED | OUTPATIENT
Start: 2018-07-03 | End: 2018-08-28 | Stop reason: SDUPTHER

## 2018-07-03 RX ORDER — ESZOPICLONE 3 MG/1
TABLET, FILM COATED ORAL
Qty: 30 TABLET | Refills: 2 | OUTPATIENT
Start: 2018-07-03

## 2018-07-10 RX ORDER — GABAPENTIN 600 MG/1
TABLET ORAL
Qty: 30 TABLET | Refills: 5 | OUTPATIENT
Start: 2018-07-10

## 2018-07-18 ENCOUNTER — LAB (OUTPATIENT)
Dept: LAB | Facility: OTHER | Age: 78
End: 2018-07-18

## 2018-07-18 DIAGNOSIS — E55.9 VITAMIN D DEFICIENCY: Chronic | ICD-10-CM

## 2018-07-18 DIAGNOSIS — E78.2 MIXED HYPERLIPIDEMIA: Chronic | ICD-10-CM

## 2018-07-18 DIAGNOSIS — M10.30 GOUT DUE TO RENAL IMPAIRMENT, UNSPECIFIED CHRONICITY, UNSPECIFIED SITE: Chronic | ICD-10-CM

## 2018-07-18 DIAGNOSIS — E11.22 TYPE 2 DIABETES MELLITUS WITH STAGE 2 CHRONIC KIDNEY DISEASE, WITHOUT LONG-TERM CURRENT USE OF INSULIN (HCC): Chronic | ICD-10-CM

## 2018-07-18 DIAGNOSIS — Z12.5 SPECIAL SCREENING FOR MALIGNANT NEOPLASM OF PROSTATE: ICD-10-CM

## 2018-07-18 DIAGNOSIS — N18.2 TYPE 2 DIABETES MELLITUS WITH STAGE 2 CHRONIC KIDNEY DISEASE, WITHOUT LONG-TERM CURRENT USE OF INSULIN (HCC): Chronic | ICD-10-CM

## 2018-07-18 DIAGNOSIS — E79.0 HYPERURICEMIA: Chronic | ICD-10-CM

## 2018-07-18 LAB
25(OH)D3 SERPL-MCNC: 60 NG/ML (ref 30–100)
ALBUMIN SERPL-MCNC: 4.1 G/DL (ref 3.5–5)
ALBUMIN/GLOB SERPL: 1.5 G/DL (ref 1.1–1.8)
ALP SERPL-CCNC: 95 U/L (ref 38–126)
ALT SERPL W P-5'-P-CCNC: 19 U/L
ANION GAP SERPL CALCULATED.3IONS-SCNC: 11 MMOL/L (ref 5–15)
AST SERPL-CCNC: 17 U/L (ref 17–59)
BASOPHILS # BLD AUTO: 0.02 10*3/MM3 (ref 0–0.2)
BASOPHILS NFR BLD AUTO: 0.3 % (ref 0–2)
BILIRUB SERPL-MCNC: 0.4 MG/DL (ref 0.2–1.3)
BUN BLD-MCNC: 22 MG/DL (ref 9–20)
BUN/CREAT SERPL: 16.5 (ref 7–25)
CALCIUM SPEC-SCNC: 9.3 MG/DL (ref 8.4–10.2)
CHLORIDE SERPL-SCNC: 105 MMOL/L (ref 98–107)
CHOLEST SERPL-MCNC: 121 MG/DL (ref 150–200)
CO2 SERPL-SCNC: 26 MMOL/L (ref 22–30)
CREAT BLD-MCNC: 1.33 MG/DL (ref 0.66–1.25)
DEPRECATED RDW RBC AUTO: 47.3 FL (ref 35.1–43.9)
EOSINOPHIL # BLD AUTO: 0.33 10*3/MM3 (ref 0–0.7)
EOSINOPHIL NFR BLD AUTO: 4.9 % (ref 0–7)
ERYTHROCYTE [DISTWIDTH] IN BLOOD BY AUTOMATED COUNT: 14.5 % (ref 11.5–14.5)
GFR SERPL CREATININE-BSD FRML MDRD: 52 ML/MIN/1.73 (ref 42–98)
GLOBULIN UR ELPH-MCNC: 2.7 GM/DL (ref 2.3–3.5)
GLUCOSE BLD-MCNC: 114 MG/DL (ref 74–99)
HBA1C MFR BLD: 6.1 % (ref 4–5.6)
HCT VFR BLD AUTO: 42.6 % (ref 39–49)
HDLC SERPL-MCNC: 22 MG/DL (ref 40–59)
HGB BLD-MCNC: 14.2 G/DL (ref 13.7–17.3)
LDLC SERPL CALC-MCNC: 65 MG/DL
LDLC/HDLC SERPL: 2.95 {RATIO} (ref 0–3.55)
LYMPHOCYTES # BLD AUTO: 1.71 10*3/MM3 (ref 0.6–4.2)
LYMPHOCYTES NFR BLD AUTO: 25.4 % (ref 10–50)
MCH RBC QN AUTO: 30.3 PG (ref 26.5–34)
MCHC RBC AUTO-ENTMCNC: 33.3 G/DL (ref 31.5–36.3)
MCV RBC AUTO: 91 FL (ref 80–98)
MONOCYTES # BLD AUTO: 0.66 10*3/MM3 (ref 0–0.9)
MONOCYTES NFR BLD AUTO: 9.8 % (ref 0–12)
NEUTROPHILS # BLD AUTO: 4.01 10*3/MM3 (ref 2–8.6)
NEUTROPHILS NFR BLD AUTO: 59.6 % (ref 37–80)
PLATELET # BLD AUTO: 177 10*3/MM3 (ref 150–450)
PMV BLD AUTO: 11.6 FL (ref 8–12)
POTASSIUM BLD-SCNC: 4 MMOL/L (ref 3.4–5)
PROT SERPL-MCNC: 6.8 G/DL (ref 6.3–8.2)
PSA SERPL-MCNC: 0.14 NG/ML (ref 0–4)
RBC # BLD AUTO: 4.68 10*6/MM3 (ref 4.37–5.74)
SODIUM BLD-SCNC: 142 MMOL/L (ref 137–145)
TRIGL SERPL-MCNC: 170 MG/DL
URATE SERPL-MCNC: 6.9 MG/DL (ref 3.5–8.5)
VLDLC SERPL-MCNC: 34 MG/DL
WBC NRBC COR # BLD: 6.73 10*3/MM3 (ref 3.2–9.8)

## 2018-07-18 PROCEDURE — 80061 LIPID PANEL: CPT | Performed by: INTERNAL MEDICINE

## 2018-07-18 PROCEDURE — 83036 HEMOGLOBIN GLYCOSYLATED A1C: CPT | Performed by: INTERNAL MEDICINE

## 2018-07-18 PROCEDURE — G0103 PSA SCREENING: HCPCS | Performed by: INTERNAL MEDICINE

## 2018-07-18 PROCEDURE — 84550 ASSAY OF BLOOD/URIC ACID: CPT | Performed by: INTERNAL MEDICINE

## 2018-07-18 PROCEDURE — 85025 COMPLETE CBC W/AUTO DIFF WBC: CPT | Performed by: INTERNAL MEDICINE

## 2018-07-18 PROCEDURE — 36415 COLL VENOUS BLD VENIPUNCTURE: CPT | Performed by: INTERNAL MEDICINE

## 2018-07-18 PROCEDURE — 82306 VITAMIN D 25 HYDROXY: CPT | Performed by: INTERNAL MEDICINE

## 2018-07-18 PROCEDURE — 80053 COMPREHEN METABOLIC PANEL: CPT | Performed by: INTERNAL MEDICINE

## 2018-07-23 ENCOUNTER — OFFICE VISIT (OUTPATIENT)
Dept: FAMILY MEDICINE CLINIC | Facility: CLINIC | Age: 78
End: 2018-07-23

## 2018-07-23 VITALS
TEMPERATURE: 98 F | BODY MASS INDEX: 40.87 KG/M2 | OXYGEN SATURATION: 96 % | HEIGHT: 67 IN | SYSTOLIC BLOOD PRESSURE: 124 MMHG | WEIGHT: 260.4 LBS | DIASTOLIC BLOOD PRESSURE: 72 MMHG | HEART RATE: 75 BPM

## 2018-07-23 DIAGNOSIS — E79.0 HYPERURICEMIA: Chronic | ICD-10-CM

## 2018-07-23 DIAGNOSIS — E55.9 VITAMIN D DEFICIENCY: Chronic | ICD-10-CM

## 2018-07-23 DIAGNOSIS — N18.30 CHRONIC KIDNEY DISEASE, STAGE 3 (HCC): Chronic | ICD-10-CM

## 2018-07-23 DIAGNOSIS — IMO0001 CLASS 3 OBESITY DUE TO EXCESS CALORIES WITH SERIOUS COMORBIDITY AND BODY MASS INDEX (BMI) OF 40.0 TO 44.9 IN ADULT: Chronic | ICD-10-CM

## 2018-07-23 DIAGNOSIS — I25.10 CORONARY ARTERIOSCLEROSIS: Chronic | ICD-10-CM

## 2018-07-23 DIAGNOSIS — E78.1 HYPERTRIGLYCERIDEMIA: Chronic | ICD-10-CM

## 2018-07-23 DIAGNOSIS — E11.22 TYPE 2 DIABETES MELLITUS WITH STAGE 2 CHRONIC KIDNEY DISEASE, WITHOUT LONG-TERM CURRENT USE OF INSULIN (HCC): Primary | Chronic | ICD-10-CM

## 2018-07-23 DIAGNOSIS — N18.2 TYPE 2 DIABETES MELLITUS WITH STAGE 2 CHRONIC KIDNEY DISEASE, WITHOUT LONG-TERM CURRENT USE OF INSULIN (HCC): Primary | Chronic | ICD-10-CM

## 2018-07-23 DIAGNOSIS — K21.9 GASTROESOPHAGEAL REFLUX DISEASE, ESOPHAGITIS PRESENCE NOT SPECIFIED: Chronic | ICD-10-CM

## 2018-07-23 DIAGNOSIS — E78.2 MIXED HYPERLIPIDEMIA: Chronic | ICD-10-CM

## 2018-07-23 DIAGNOSIS — J43.2 CENTRILOBULAR EMPHYSEMA (HCC): Chronic | ICD-10-CM

## 2018-07-23 DIAGNOSIS — G47.33 OBSTRUCTIVE SLEEP APNEA SYNDROME: Chronic | ICD-10-CM

## 2018-07-23 PROCEDURE — 99214 OFFICE O/P EST MOD 30 MIN: CPT | Performed by: INTERNAL MEDICINE

## 2018-07-23 RX ORDER — ROSUVASTATIN CALCIUM 20 MG/1
20 TABLET, COATED ORAL EVERY OTHER DAY
Qty: 15 TABLET | Refills: 11 | Status: SHIPPED | OUTPATIENT
Start: 2018-07-23 | End: 2018-08-28 | Stop reason: SDUPTHER

## 2018-07-23 RX ORDER — ATORVASTATIN CALCIUM 20 MG/1
20 TABLET, FILM COATED ORAL NIGHTLY
COMMUNITY
End: 2018-07-23 | Stop reason: ALTCHOICE

## 2018-07-23 NOTE — PATIENT INSTRUCTIONS
Exercising to Lose Weight  Exercising can help you to lose weight. In order to lose weight through exercise, you need to do vigorous-intensity exercise. You can tell that you are exercising with vigorous intensity if you are breathing very hard and fast and cannot hold a conversation while exercising.  Moderate-intensity exercise helps to maintain your current weight. You can tell that you are exercising at a moderate level if you have a higher heart rate and faster breathing, but you are still able to hold a conversation.  How often should I exercise?  Choose an activity that you enjoy and set realistic goals. Your health care provider can help you to make an activity plan that works for you. Exercise regularly as directed by your health care provider. This may include:  · Doing resistance training twice each week, such as:  ? Push-ups.  ? Sit-ups.  ? Lifting weights.  ? Using resistance bands.  · Doing a given intensity of exercise for a given amount of time. Choose from these options:  ? 150 minutes of moderate-intensity exercise every week.  ? 75 minutes of vigorous-intensity exercise every week.  ? A mix of moderate-intensity and vigorous-intensity exercise every week.    Children, pregnant women, people who are out of shape, people who are overweight, and older adults may need to consult a health care provider for individual recommendations. If you have any sort of medical condition, be sure to consult your health care provider before starting a new exercise program.  What are some activities that can help me to lose weight?  · Walking at a rate of at least 4.5 miles an hour.  · Jogging or running at a rate of 5 miles per hour.  · Biking at a rate of at least 10 miles per hour.  · Lap swimming.  · Roller-skating or in-line skating.  · Cross-country skiing.  · Vigorous competitive sports, such as football, basketball, and soccer.  · Jumping rope.  · Aerobic dancing.  How can I be more active in my day-to-day  activities?  · Use the stairs instead of the elevator.  · Take a walk during your lunch break.  · If you drive, park your car farther away from work or school.  · If you take public transportation, get off one stop early and walk the rest of the way.  · Make all of your phone calls while standing up and walking around.  · Get up, stretch, and walk around every 30 minutes throughout the day.  What guidelines should I follow while exercising?  · Do not exercise so much that you hurt yourself, feel dizzy, or get very short of breath.  · Consult your health care provider prior to starting a new exercise program.  · Wear comfortable clothes and shoes with good support.  · Drink plenty of water while you exercise to prevent dehydration or heat stroke. Body water is lost during exercise and must be replaced.  · Work out until you breathe faster and your heart beats faster.  This information is not intended to replace advice given to you by your health care provider. Make sure you discuss any questions you have with your health care provider.  Document Released: 01/20/2012 Document Revised: 05/25/2017 Document Reviewed: 05/21/2015  [employer] Patient Education © 2018 [employer]      Calorie Counting for Weight Loss  Calories are units of energy. Your body needs a certain amount of calories from food to keep you going throughout the day. When you eat more calories than your body needs, your body stores the extra calories as fat. When you eat fewer calories than your body needs, your body burns fat to get the energy it needs.  Calorie counting means keeping track of how many calories you eat and drink each day. Calorie counting can be helpful if you need to lose weight. If you make sure to eat fewer calories than your body needs, you should lose weight. Ask your health care provider what a healthy weight is for you.  For calorie counting to work, you will need to eat the right number of calories in a day in order  to lose a healthy amount of weight per week. A dietitian can help you determine how many calories you need in a day and will give you suggestions on how to reach your calorie goal.  · A healthy amount of weight to lose per week is usually 1-2 lb (0.5-0.9 kg). This usually means that your daily calorie intake should be reduced by 500-750 calories.  · Eating 1,200 - 1,500 calories per day can help most women lose weight.  · Eating 1,500 - 1,800 calories per day can help most men lose weight.    What do I need to know about calorie counting?  In order to meet your daily calorie goal, you will need to:  · Find out how many calories are in each food you would like to eat. Try to do this before you eat.  · Decide how much of the food you plan to eat.  · Write down what you ate and how many calories it had. Doing this is called keeping a food log.    To successfully lose weight, it is important to balance calorie counting with a healthy lifestyle that includes regular activity. Aim for 150 minutes of moderate exercise (such as walking) or 75 minutes of vigorous exercise (such as running) each week.  Where do I find calorie information?    The number of calories in a food can be found on a Nutrition Facts label. If a food does not have a Nutrition Facts label, try to look up the calories online or ask your dietitian for help.  Remember that calories are listed per serving. If you choose to have more than one serving of a food, you will have to multiply the calories per serving by the amount of servings you plan to eat. For example, the label on a package of bread might say that a serving size is 1 slice and that there are 90 calories in a serving. If you eat 1 slice, you will have eaten 90 calories. If you eat 2 slices, you will have eaten 180 calories.  How do I keep a food log?  Immediately after each meal, record the following information in your food log:  · What you ate. Don't forget to include toppings, sauces, and  "other extras on the food.  · How much you ate. This can be measured in cups, ounces, or number of items.  · How many calories each food and drink had.  · The total number of calories in the meal.    Keep your food log near you, such as in a small notebook in your pocket, or use a mobile kelby or website. Some programs will calculate calories for you and show you how many calories you have left for the day to meet your goal.  What are some calorie counting tips?  · Use your calories on foods and drinks that will fill you up and not leave you hungry:  ? Some examples of foods that fill you up are nuts and nut butters, vegetables, lean proteins, and high-fiber foods like whole grains. High-fiber foods are foods with more than 5 g fiber per serving.  ? Drinks such as sodas, specialty coffee drinks, alcohol, and juices have a lot of calories, yet do not fill you up.  · Eat nutritious foods and avoid empty calories. Empty calories are calories you get from foods or beverages that do not have many vitamins or protein, such as candy, sweets, and soda. It is better to have a nutritious high-calorie food (such as an avocado) than a food with few nutrients (such as a bag of chips).  · Know how many calories are in the foods you eat most often. This will help you calculate calorie counts faster.  · Pay attention to calories in drinks. Low-calorie drinks include water and unsweetened drinks.  · Pay attention to nutrition labels for \"low fat\" or \"fat free\" foods. These foods sometimes have the same amount of calories or more calories than the full fat versions. They also often have added sugar, starch, or salt, to make up for flavor that was removed with the fat.  · Find a way of tracking calories that works for you. Get creative. Try different apps or programs if writing down calories does not work for you.  What are some portion control tips?  · Know how many calories are in a serving. This will help you know how many servings of " a certain food you can have.  · Use a measuring cup to measure serving sizes. You could also try weighing out portions on a kitchen scale. With time, you will be able to estimate serving sizes for some foods.  · Take some time to put servings of different foods on your favorite plates, bowls, and cups so you know what a serving looks like.  · Try not to eat straight from a bag or box. Doing this can lead to overeating. Put the amount you would like to eat in a cup or on a plate to make sure you are eating the right portion.  · Use smaller plates, glasses, and bowls to prevent overeating.  · Try not to multitask (for example, watch TV or use your computer) while eating. If it is time to eat, sit down at a table and enjoy your food. This will help you to know when you are full. It will also help you to be aware of what you are eating and how much you are eating.  What are tips for following this plan?  Reading food labels  · Check the calorie count compared to the serving size. The serving size may be smaller than what you are used to eating.  · Check the source of the calories. Make sure the food you are eating is high in vitamins and protein and low in saturated and trans fats.  Shopping  · Read nutrition labels while you shop. This will help you make healthy decisions before you decide to purchase your food.  · Make a grocery list and stick to it.  Cooking  · Try to cook your favorite foods in a healthier way. For example, try baking instead of frying.  · Use low-fat dairy products.  Meal planning  · Use more fruits and vegetables. Half of your plate should be fruits and vegetables.  · Include lean proteins like poultry and fish.  How do I count calories when eating out?  · Ask for smaller portion sizes.  · Consider sharing an entree and sides instead of getting your own entree.  · If you get your own entree, eat only half. Ask for a box at the beginning of your meal and put the rest of your entree in it so you are  not tempted to eat it.  · If calories are listed on the menu, choose the lower calorie options.  · Choose dishes that include vegetables, fruits, whole grains, low-fat dairy products, and lean protein.  · Choose items that are boiled, broiled, grilled, or steamed. Stay away from items that are buttered, battered, fried, or served with cream sauce. Items labeled “crispy” are usually fried, unless stated otherwise.  · Choose water, low-fat milk, unsweetened iced tea, or other drinks without added sugar. If you want an alcoholic beverage, choose a lower calorie option such as a glass of wine or light beer.  · Ask for dressings, sauces, and syrups on the side. These are usually high in calories, so you should limit the amount you eat.  · If you want a salad, choose a garden salad and ask for grilled meats. Avoid extra toppings like baum, cheese, or fried items. Ask for the dressing on the side, or ask for olive oil and vinegar or lemon to use as dressing.  · Estimate how many servings of a food you are given. For example, a serving of cooked rice is ½ cup or about the size of half a baseball. Knowing serving sizes will help you be aware of how much food you are eating at restaurants. The list below tells you how big or small some common portion sizes are based on everyday objects:  ? 1 oz--4 stacked dice.  ? 3 oz--1 deck of cards.  ? 1 tsp--1 die.  ? 1 Tbsp--½ a ping-pong ball.  ? 2 Tbsp--1 ping-pong ball.  ? ½ cup--½ baseball.  ? 1 cup--1 baseball.  Summary  · Calorie counting means keeping track of how many calories you eat and drink each day. If you eat fewer calories than your body needs, you should lose weight.  · A healthy amount of weight to lose per week is usually 1-2 lb (0.5-0.9 kg). This usually means reducing your daily calorie intake by 500-750 calories.  · The number of calories in a food can be found on a Nutrition Facts label. If a food does not have a Nutrition Facts label, try to look up the calories  online or ask your dietitian for help.  · Use your calories on foods and drinks that will fill you up, and not on foods and drinks that will leave you hungry.  · Use smaller plates, glasses, and bowls to prevent overeating.  This information is not intended to replace advice given to you by your health care provider. Make sure you discuss any questions you have with your health care provider.  Document Released: 12/18/2006 Document Revised: 11/17/2017 Document Reviewed: 11/17/2017  [employer] Interactive Patient Education © 2018 [employer]

## 2018-07-23 NOTE — PROGRESS NOTES
Subjective        History of Present Illness     Eleno Savage is a 78 y.o. male who presents for 6-month follow up on type 2 diabetes mellitus, stage 2 chronic kidney disease, obesity, COPD, GERD, vitamin D deficiency, and high cholesterol/high triglycerides.  He follows with Dr. Weathers, nephrologist annually for CKD and has had a significant improvement in renal function over the past few months.  He reports compliance with nightly CPAP.  GERD symptoms adequately controlled with Prilosec.  He denies any recent gout flares.  He uses Advair to help manage COPD symptoms.  Diabetes is excellently controlled currently with diet.       Last year, I had him stop fenofibrate and start Lipitor 20 mg q.o.d. due to LDL above goal and stage 3 chronic kidney disease.  His renal function is improved with creatinine decreasing from 1.9 down to 1.3 with this set of labs. He continues to avoid NSAIDs and other chronic nephrotoxic medications.    He reports he is currently taking the Lipitor, but is having increased myalgias.  I recommended trying q.o.d. Crestor.  He is in agreement.     He is having some episodic lower abdominal discomfort with the most recent flare a couple of weeks ago.  He reports history of diverticulosis.   He denies GERD symptoms.  I offered referral to GI.  He declines.   I recommended he come back in when he experiences recurrence of symptoms.        Dr. Mesa is his cardiologist following his coronary artery disease.  He had heart cath on July 11th and has an appointment scheduled for August 13th to review results.  He reports 50% blockage, which Dr. Mesa plans to treat with medical management.   He reports episodic palpitations.  He has mentioned this to Dr. Mesa.  I recommended he notify him if frequency or intensify of palpitations increase.      Weight is down 6 pounds in the past six months.  Blood pressure at goal.     The patient's relevant past medical, surgical, and social history was  "reviewed in Epic.   Lab results are reviewed with the patient today. CBC unremarkable. Fasting glucose 114.  A1c is stable at 6.1.  Total cholesterol 121 on Lipitor .  HDL 22.  LDL 65.  Triglycerides 170. Liver function normal.  Renal function stable.     Review of Systems   Constitutional: Negative for chills, fatigue and fever.   HENT: Negative for congestion, ear pain, postnasal drip, sinus pressure and sore throat.    Respiratory: Negative for cough, shortness of breath and wheezing.    Cardiovascular: Negative for chest pain, palpitations and leg swelling.   Gastrointestinal: Negative for abdominal pain, blood in stool, constipation, diarrhea, nausea and vomiting.   Endocrine: Negative for cold intolerance, heat intolerance, polydipsia and polyuria.   Genitourinary: Negative for dysuria, frequency, hematuria and urgency.   Skin: Negative for rash.   Neurological: Negative for syncope and weakness.        Objective     Vitals:    07/23/18 1028   BP: 124/72   Pulse: 75   Temp: 98 °F (36.7 °C)   SpO2: 96%   Weight: 118 kg (260 lb 6.4 oz)   Height: 170.2 cm (67\")     Physical Exam   Constitutional: He is oriented to person, place, and time. He appears well-developed and well-nourished. No distress.   Obese male.     HENT:   Head: Normocephalic and atraumatic.   Nose: Right sinus exhibits no maxillary sinus tenderness and no frontal sinus tenderness. Left sinus exhibits no maxillary sinus tenderness and no frontal sinus tenderness.   Mouth/Throat: Uvula is midline, oropharynx is clear and moist and mucous membranes are normal. No oral lesions. No tonsillar exudate.   Eyes: Pupils are equal, round, and reactive to light. Conjunctivae and EOM are normal.   Neck: Trachea normal. Neck supple. No JVD present. Carotid bruit is not present. No tracheal deviation present. No thyroid mass and no thyromegaly present.   Cardiovascular: Normal rate, regular rhythm, normal heart sounds and intact distal pulses.   No " extrasystoles are present. PMI is not displaced.    No murmur heard.  Pulmonary/Chest: Effort normal and breath sounds normal. No accessory muscle usage. No respiratory distress. He has no decreased breath sounds. He has no wheezes. He has no rhonchi. He has no rales.   Abdominal: Soft. Bowel sounds are normal. He exhibits no distension. There is no hepatosplenomegaly. There is no tenderness.   Overweight abdomen limits exam     Vascular Status -  His right foot exhibits abnormal foot vasculature  and abnormal foot edema (Trace edema on the right lower extremity with chronic venous insufficiency). His left foot exhibits abnormal foot vasculature  and abnormal foot edema.  Lymphadenopathy:     He has no cervical adenopathy.   Neurological: He is alert and oriented to person, place, and time. No cranial nerve deficit. Coordination normal.   Skin: Skin is warm, dry and intact. No rash noted. No cyanosis. Nails show no clubbing.   Psychiatric: He has a normal mood and affect. His speech is normal and behavior is normal. Judgment and thought content normal.   Vitals reviewed.  moderate air movement and a few chronic lung sounds.     Assessment/Plan      Stop the Lipitor and due to myalgia complaints and start Crestor 20 mg q.o.d.  Keep his cardiology appointment with Dr. Mesa scheduled for August 13th. Notify me if he has tolerability issues with statin therapy.   I recommended he notify Dr. Mesa, cardiologist, if he has increase in frequency or intensify of palpitations.  He has mentioned this to him before.     Continue diet control to manage diabetes.  Intensify diabetic diet, exercise, and weight loss efforts. Written literature regarding diet and exercise included in patient's AVS today.     Continue omeprazole to manage GERD symptoms.      Continue to avoid NSAIDs and other chronic nephrotoxic medications.      Continue other medications and vitamin and mineral supplements to treat additional medical problems  which we addressed today.      Return in six months for follow up with fasting labs one week prior.       Intensify diet, exercise, and weight loss efforts.  Written literature regarding weight loss and exercise included in AVS today.        Scribed for Dr. Israel by Lacy Bustos [facility].     Diagnoses and all orders for this visit:    Type 2 diabetes mellitus with stage 2 chronic kidney disease, without long-term current use of insulin (CMS/[facility])  -     CBC Auto Differential; Future  -     Comprehensive Metabolic Panel; Future  -     Hemoglobin A1c; Future  -     TSH; Future    Mixed hyperlipidemia  -     LDL Cholesterol, Direct; Future    Coronary arteriosclerosis - followed by Dr. Farooq    Hypertriglyceridemia    Obstructive sleep apnea syndrome    Vitamin D deficiency  -     Vitamin D 25 Hydroxy; Future    Class 3 obesity due to excess calories with serious comorbidity and body mass index (BMI) of 40.0 to 44.9 in adult (CMS/[facility])    Gastroesophageal reflux disease, esophagitis presence not specified    Chronic kidney disease, stage 3    Hyperuricemia  -     Uric Acid; Future    Centrilobular emphysema (CMS/[facility])    Other orders  -     Discontinue: atorvastatin (LIPITOR) 20 MG tablet; Take 20 mg by mouth Every Night.  -     rosuvastatin (CRESTOR) 20 MG tablet; Take 1 tablet by mouth Every Other Day.        Lab on 07/18/2018   Component Date Value Ref Range Status   • 25 Hydroxy, Vitamin D 07/18/2018 60.0  30.0 - 100.0 ng/ml Final   • PSA 07/18/2018 0.137  0.000 - 4.000 ng/mL Final   • WBC 07/18/2018 6.73  3.20 - 9.80 10*3/mm3 Final   • RBC 07/18/2018 4.68  4.37 - 5.74 10*6/mm3 Final   • Hemoglobin 07/18/2018 14.2  13.7 - 17.3 g/dL Final   • Hematocrit 07/18/2018 42.6  39.0 - 49.0 % Final   • MCV 07/18/2018 91.0  80.0 - 98.0 fL Final   • MCH 07/18/2018 30.3  26.5 - 34.0 pg Final   • MCHC 07/18/2018 33.3  31.5 - 36.3 g/dL Final   • RDW 07/18/2018 14.5  11.5 - 14.5 % Final   • RDW-SD 07/18/2018 47.3* 35.1 - 43.9  fl Final   • MPV 07/18/2018 11.6  8.0 - 12.0 fL Final   • Platelets 07/18/2018 177  150 - 450 10*3/mm3 Final   • Neutrophil % 07/18/2018 59.6  37.0 - 80.0 % Final   • Lymphocyte % 07/18/2018 25.4  10.0 - 50.0 % Final   • Monocyte % 07/18/2018 9.8  0.0 - 12.0 % Final   • Eosinophil % 07/18/2018 4.9  0.0 - 7.0 % Final   • Basophil % 07/18/2018 0.3  0.0 - 2.0 % Final   • Neutrophils, Absolute 07/18/2018 4.01  2.00 - 8.60 10*3/mm3 Final   • Lymphocytes, Absolute 07/18/2018 1.71  0.60 - 4.20 10*3/mm3 Final   • Monocytes, Absolute 07/18/2018 0.66  0.00 - 0.90 10*3/mm3 Final   • Eosinophils, Absolute 07/18/2018 0.33  0.00 - 0.70 10*3/mm3 Final   • Basophils, Absolute 07/18/2018 0.02  0.00 - 0.20 10*3/mm3 Final   • Glucose 07/18/2018 114* 74 - 99 mg/dL Final   • BUN 07/18/2018 22* 9 - 20 mg/dL Final   • Creatinine 07/18/2018 1.33* 0.66 - 1.25 mg/dL Final   • Sodium 07/18/2018 142  137 - 145 mmol/L Final   • Potassium 07/18/2018 4.0  3.4 - 5.0 mmol/L Final   • Chloride 07/18/2018 105  98 - 107 mmol/L Final   • CO2 07/18/2018 26.0  22.0 - 30.0 mmol/L Final   • Calcium 07/18/2018 9.3  8.4 - 10.2 mg/dL Final   • Total Protein 07/18/2018 6.8  6.3 - 8.2 g/dL Final   • Albumin 07/18/2018 4.10  3.50 - 5.00 g/dL Final   • ALT (SGPT) 07/18/2018 19  <=50 U/L Final   • AST (SGOT) 07/18/2018 17  17 - 59 U/L Final   • Alkaline Phosphatase 07/18/2018 95  38 - 126 U/L Final   • Total Bilirubin 07/18/2018 0.4  0.2 - 1.3 mg/dL Final   • eGFR Non African Amer 07/18/2018 52  42 - 98 mL/min/1.73 Final   • Globulin 07/18/2018 2.7  2.3 - 3.5 gm/dL Final   • A/G Ratio 07/18/2018 1.5  1.1 - 1.8 g/dL Final   • BUN/Creatinine Ratio 07/18/2018 16.5  7.0 - 25.0 Final   • Anion Gap 07/18/2018 11.0  5.0 - 15.0 mmol/L Final   • Hemoglobin A1C 07/18/2018 6.1* 4 - 5.6 % Final   • Total Cholesterol 07/18/2018 121* 150 - 200 mg/dL Final   • Triglycerides 07/18/2018 170* <=150 mg/dL Final   • HDL Cholesterol 07/18/2018 22* 40 - 59 mg/dL Final   • LDL  Cholesterol  07/18/2018 65  <=100 mg/dL Final   • VLDL Cholesterol 07/18/2018 34  mg/dL Final   • LDL/HDL Ratio 07/18/2018 2.95  0.00 - 3.55 Final   • Uric Acid 07/18/2018 6.9  3.5 - 8.5 mg/dL Final   ]

## 2018-07-30 RX ORDER — ESZOPICLONE 3 MG/1
TABLET, FILM COATED ORAL
Qty: 30 TABLET | Refills: 2 | Status: SHIPPED | OUTPATIENT
Start: 2018-07-30 | End: 2018-10-05 | Stop reason: SDUPTHER

## 2018-07-30 RX ORDER — GABAPENTIN 600 MG/1
TABLET ORAL
Qty: 30 TABLET | Refills: 5 | Status: SHIPPED | OUTPATIENT
Start: 2018-07-30 | End: 2019-01-28 | Stop reason: SDUPTHER

## 2018-07-31 RX ORDER — TRIAMTERENE AND HYDROCHLOROTHIAZIDE 37.5; 25 MG/1; MG/1
TABLET ORAL
Qty: 30 TABLET | Refills: 11 | Status: SHIPPED | OUTPATIENT
Start: 2018-07-31 | End: 2018-08-28 | Stop reason: SDUPTHER

## 2018-08-08 RX ORDER — CLOPIDOGREL BISULFATE 75 MG/1
TABLET ORAL
Qty: 30 TABLET | Refills: 11 | Status: SHIPPED | OUTPATIENT
Start: 2018-08-08 | End: 2018-08-28 | Stop reason: SDUPTHER

## 2018-08-28 RX ORDER — ISOSORBIDE MONONITRATE 30 MG/1
30 TABLET, EXTENDED RELEASE ORAL DAILY
Qty: 90 TABLET | Refills: 3 | Status: SHIPPED | OUTPATIENT
Start: 2018-08-28 | End: 2019-11-19 | Stop reason: SDUPTHER

## 2018-08-28 RX ORDER — TOLTERODINE 4 MG/1
4 CAPSULE, EXTENDED RELEASE ORAL NIGHTLY
Qty: 90 CAPSULE | Refills: 3 | Status: SHIPPED | OUTPATIENT
Start: 2018-08-28 | End: 2019-12-02 | Stop reason: SDUPTHER

## 2018-08-28 RX ORDER — AMLODIPINE BESYLATE AND BENAZEPRIL HYDROCHLORIDE 5; 20 MG/1; MG/1
CAPSULE ORAL
Qty: 180 CAPSULE | Refills: 3 | Status: SHIPPED | OUTPATIENT
Start: 2018-08-28 | End: 2019-11-04 | Stop reason: SDUPTHER

## 2018-08-28 RX ORDER — CLOPIDOGREL BISULFATE 75 MG/1
75 TABLET ORAL DAILY
Qty: 90 TABLET | Refills: 3 | Status: SHIPPED | OUTPATIENT
Start: 2018-08-28 | End: 2019-11-25 | Stop reason: SDUPTHER

## 2018-08-28 RX ORDER — ROSUVASTATIN CALCIUM 20 MG/1
20 TABLET, COATED ORAL EVERY OTHER DAY
Qty: 45 TABLET | Refills: 3 | Status: SHIPPED | OUTPATIENT
Start: 2018-08-28 | End: 2019-09-04 | Stop reason: SDUPTHER

## 2018-08-28 RX ORDER — BUSPIRONE HYDROCHLORIDE 10 MG/1
TABLET ORAL
Qty: 180 TABLET | Refills: 3 | Status: SHIPPED | OUTPATIENT
Start: 2018-08-28 | End: 2019-08-20 | Stop reason: SDUPTHER

## 2018-08-28 RX ORDER — TAMSULOSIN HYDROCHLORIDE 0.4 MG/1
1 CAPSULE ORAL NIGHTLY
Qty: 90 CAPSULE | Refills: 3 | Status: SHIPPED | OUTPATIENT
Start: 2018-08-28 | End: 2019-10-21 | Stop reason: SDUPTHER

## 2018-08-28 RX ORDER — TRIAMTERENE AND HYDROCHLOROTHIAZIDE 37.5; 25 MG/1; MG/1
1 TABLET ORAL DAILY
Qty: 90 TABLET | Refills: 3 | Status: SHIPPED | OUTPATIENT
Start: 2018-08-28 | End: 2019-11-04 | Stop reason: SDUPTHER

## 2018-08-28 RX ORDER — TRAZODONE HYDROCHLORIDE 150 MG/1
TABLET ORAL
Qty: 90 TABLET | Refills: 3 | Status: SHIPPED | OUTPATIENT
Start: 2018-08-28 | End: 2019-09-09 | Stop reason: SDUPTHER

## 2018-08-30 ENCOUNTER — OFFICE VISIT (OUTPATIENT)
Dept: OTOLARYNGOLOGY | Facility: CLINIC | Age: 78
End: 2018-08-30

## 2018-08-30 VITALS — WEIGHT: 259.8 LBS | HEIGHT: 68 IN | BODY MASS INDEX: 39.37 KG/M2 | OXYGEN SATURATION: 98 %

## 2018-08-30 DIAGNOSIS — G47.33 OBSTRUCTIVE SLEEP APNEA SYNDROME: Primary | ICD-10-CM

## 2018-08-30 DIAGNOSIS — J31.0 CHRONIC RHINITIS, UNSPECIFIED TYPE: ICD-10-CM

## 2018-08-30 PROCEDURE — 99213 OFFICE O/P EST LOW 20 MIN: CPT | Performed by: OTOLARYNGOLOGY

## 2018-09-03 NOTE — PROGRESS NOTES
Subjective   Eleno Savage is a 78 y.o. male.       History of Present Illness   Patient is followed with chronic rhinitis along with obstructive sleep apnea syndrome.  Utilize his CPAP.  Brings his utilization data printout with him and is always he has excellent compliance and results.  Out of 90 days only had 2 days without device usage.  On days he had device usage he had greater than 4 hours on all occasions.  Average use is greater than 8 hours average time large leak 1 minute 33 seconds average apnea hypopnea index is 2.2.  Also has chronic rhinitis for which he uses Flonase.  These symptoms are well controlled with no purulent rhinorrhea.      The following portions of the patient's history were reviewed and updated as appropriate: allergies, current medications, past family history, past medical history, past social history, past surgical history and problem list.     reports that he has quit smoking. He has never used smokeless tobacco. He reports that he does not drink alcohol or use drugs.   Patient is not a tobacco user and has not been counseled for use of tobacco products      Review of Systems   Constitutional: Negative for fever.           Objective   Physical Exam  General: Well-developed well-nourished male in no acute distress.  Alert and oriented ×3. Head: Normocephalic.Voice:Strong. Speech:Fluent  Ears: External ears no deformity, canals no discharge, tympanic membranes intact clear and mobile bilaterally.  Nose: Nares show no discharge mass polyp or purulence.  Boggy mucosa is present.  No gross external deformity.  Septum: Midline  Oral cavity: Lips and gums without lesions.  Tongue and floor of mouth without lesions.  Parotid and submandibular ducts unobstructed.  No mucosal lesions on the buccal mucosa or vestibule of the mouth.  Pharynx: No erythema exudate mass or ulcer  Neck: No lymphadenopathy.  No thyromegaly.  Trachea and larynx midline.  No masses in the parotid or submandibular  glands.    Assessment/Plan   Eleno was seen today for follow-up.    Diagnoses and all orders for this visit:    Obstructive sleep apnea syndrome    Chronic rhinitis, unspecified type      Plan: Continue CPAP use nightly.  Continue Flonase.  Return in 1 year, call sooner for problems.

## 2018-09-05 RX ORDER — ALLOPURINOL 100 MG/1
TABLET ORAL
Qty: 30 TABLET | Refills: 11 | Status: SHIPPED | OUTPATIENT
Start: 2018-09-05 | End: 2019-09-17 | Stop reason: SDUPTHER

## 2018-09-24 ENCOUNTER — OFFICE VISIT (OUTPATIENT)
Dept: FAMILY MEDICINE CLINIC | Facility: CLINIC | Age: 78
End: 2018-09-24

## 2018-09-24 ENCOUNTER — LAB (OUTPATIENT)
Dept: LAB | Facility: OTHER | Age: 78
End: 2018-09-24

## 2018-09-24 VITALS
SYSTOLIC BLOOD PRESSURE: 114 MMHG | DIASTOLIC BLOOD PRESSURE: 60 MMHG | TEMPERATURE: 97.8 F | BODY MASS INDEX: 40.15 KG/M2 | HEIGHT: 67 IN | HEART RATE: 64 BPM | WEIGHT: 255.8 LBS

## 2018-09-24 DIAGNOSIS — J30.1 CHRONIC SEASONAL ALLERGIC RHINITIS DUE TO POLLEN: Chronic | ICD-10-CM

## 2018-09-24 DIAGNOSIS — R09.89 LABILE HYPERTENSION: ICD-10-CM

## 2018-09-24 DIAGNOSIS — R31.29 MICROSCOPIC HEMATURIA: Primary | ICD-10-CM

## 2018-09-24 DIAGNOSIS — R31.29 MICROSCOPIC HEMATURIA: ICD-10-CM

## 2018-09-24 DIAGNOSIS — M79.10 MYALGIA: ICD-10-CM

## 2018-09-24 DIAGNOSIS — N18.30 CHRONIC KIDNEY DISEASE, STAGE 3 (HCC): Chronic | ICD-10-CM

## 2018-09-24 DIAGNOSIS — R10.9 ACUTE LEFT FLANK PAIN: ICD-10-CM

## 2018-09-24 LAB
BILIRUB UR QL STRIP: NEGATIVE
CLARITY UR: CLEAR
COLOR UR: YELLOW
GLUCOSE UR STRIP-MCNC: NEGATIVE MG/DL
HGB UR QL STRIP.AUTO: NEGATIVE
KETONES UR QL STRIP: NEGATIVE
LEUKOCYTE ESTERASE UR QL STRIP.AUTO: NEGATIVE
NITRITE UR QL STRIP: NEGATIVE
PH UR STRIP.AUTO: 5.5 [PH] (ref 5.5–8)
PROT UR QL STRIP: NEGATIVE
SP GR UR STRIP: 1.02 (ref 1–1.03)
UROBILINOGEN UR QL STRIP: NORMAL

## 2018-09-24 PROCEDURE — 81003 URINALYSIS AUTO W/O SCOPE: CPT | Performed by: INTERNAL MEDICINE

## 2018-09-24 PROCEDURE — 99214 OFFICE O/P EST MOD 30 MIN: CPT | Performed by: INTERNAL MEDICINE

## 2018-09-24 RX ORDER — GLUCOSAMINE HCL 500 MG
TABLET ORAL
COMMUNITY
Start: 2018-09-24

## 2018-09-24 RX ORDER — CETIRIZINE HYDROCHLORIDE 10 MG/1
TABLET ORAL
Refills: 11 | COMMUNITY
Start: 2018-09-05 | End: 2018-10-09 | Stop reason: SDUPTHER

## 2018-09-24 NOTE — PROGRESS NOTES
Subjective     Eleno Savage is a 78 y.o. male.     History of Present Illness     Eleno is our very pleasant, very complex 78-year-old white male with multiple chronic medical issues including coronary artery disease, COPD, type 2 diabetes, chronic kidney disease, sleep apnea, and multiple other medical issues.  Dr. Farooq is his cardiologist.  He is here today to address several new and chronic issues.    The patient reports that his blood pressure control has been fluctuating.  He was having some mild hypotension and mild orthostatic symptoms previously.  They contacted Dr. Farooq who recommended that he move his Imdur to evening dosing.  Overall, over the past 2 weeks, his blood pressure control looks good as I reviewed his blood pressure diary, but they are concerned about occasional daytime numbers being above goal.  Systolic has been less than 140 approximately 90% of the time.  The hypotension has resolved.  We discussed options.    The patient went to [facility] urgent care yesterday because his systolic blood pressure was 157 yesterday morning.  He was experiencing left flank pain consistent with possible renal colic, versus mild UTI, versus musculoskeletal etiology.  He had been noticing some mild dysuria and urinary frequency and urgency for couple days.  All of his urinary and left flank symptoms are resolved today.  The nurse practitioner yesterday obtained a urinalysis which demonstrated trace blood, but no evidence of urinary tract infection.  They report that a urine culture was ordered.  He denies any gross hematuria.  He is never passed a kidney stone to his knowledge.    2 months ago, we changed Lipitor to Crestor every other day due to his myalgia symptoms.  He reports symptoms are essentially the same on the Crestor.  We discussed adding coenzyme Q Q10.    The patient reminds me that he has been gradually losing some weight.  His weight is down 5 pounds in the past 2 months and down  "11 pounds this year.  We encouraged him to continue and intensify his dietary efforts.  Increase physical activity as tolerated.    Review of Systems   Constitutional: Negative for chills, fatigue and fever.   HENT: Negative for congestion, ear pain, postnasal drip, sinus pressure and sore throat.    Respiratory: Negative for cough, shortness of breath and wheezing.    Cardiovascular: Negative for chest pain, palpitations and leg swelling.   Gastrointestinal: Negative for abdominal pain, blood in stool, constipation, diarrhea, nausea and vomiting.   Endocrine: Negative for cold intolerance, heat intolerance, polydipsia and polyuria.   Genitourinary: Positive for flank pain. Negative for dysuria, frequency, hematuria and urgency.   Musculoskeletal: Positive for arthralgias and myalgias.   Skin: Negative for rash.   Neurological: Negative for syncope and weakness.       Objective     /60   Pulse 64   Temp 97.8 °F (36.6 °C) (Oral)   Ht 170.2 cm (67\")   Wt 116 kg (255 lb 12.8 oz)   BMI 40.06 kg/m²     Physical Exam   Constitutional: He is oriented to person, place, and time. He appears well-developed and well-nourished. No distress.   Obese male.     HENT:   Head: Normocephalic and atraumatic.   Nose: Right sinus exhibits no maxillary sinus tenderness and no frontal sinus tenderness. Left sinus exhibits no maxillary sinus tenderness and no frontal sinus tenderness.   Mouth/Throat: Uvula is midline, oropharynx is clear and moist and mucous membranes are normal. No oral lesions. No tonsillar exudate.   Eyes: Pupils are equal, round, and reactive to light. Conjunctivae and EOM are normal.   Neck: Trachea normal. Neck supple. No JVD present. Carotid bruit is not present. No tracheal deviation present. No thyroid mass and no thyromegaly present.   Cardiovascular: Normal rate, regular rhythm, normal heart sounds and intact distal pulses.   No extrasystoles are present. PMI is not displaced.    No murmur " heard.  Pulmonary/Chest: Effort normal and breath sounds normal. No accessory muscle usage. No respiratory distress. He has no decreased breath sounds. He has no wheezes. He has no rhonchi. He has no rales.   Abdominal: Soft. Bowel sounds are normal. He exhibits no distension. There is no hepatosplenomegaly. There is no tenderness.   Obese abdomen limits exam     Vascular Status -  His right foot exhibits abnormal foot vasculature  and abnormal foot edema (Trace edema on the right lower extremity with chronic venous insufficiency). His left foot exhibits abnormal foot vasculature  and abnormal foot edema.  Lymphadenopathy:     He has no cervical adenopathy.   Neurological: He is alert and oriented to person, place, and time. No cranial nerve deficit. Coordination normal.   Skin: Skin is warm, dry and intact. No rash noted. No cyanosis. Nails show no clubbing.   Psychiatric: He has a normal mood and affect. His speech is normal and behavior is normal. Judgment and thought content normal.   Vitals reviewed.      PHQ-2/PHQ-9 Depression Screening 1/19/2018   Little interest or pleasure in doing things 0   Feeling down, depressed, or hopeless 0   Total Score 0       Assessment/Plan     Obtain a repeat urinalysis today.  If the hematuria is significant, we will refer to urology.  Hopefully, the trace hematuria is resolved now that his symptoms have resolved.  He might have passed a tiny kidney stone, or he may have been recovering from a mild UTI.    Try taking the Imdur 30 mg one half tablet twice daily.  Continue his other current blood pressure medications.  Pursue sodium restriction and weight loss.  Continue monitoring blood pressure at home.    Continue to avoid NSAIDs and other nephrotoxic drugs.    Start coenzyme Q10 100-200 mg daily.  We will see if that helps his myalgia symptoms.  Continue the Crestor on Monday, Wednesday, and Friday for now.    Continue and intensify the weight loss efforts.    Diagnoses and  all orders for this visit:    Microscopic hematuria  -     Urinalysis With Culture If Indicated - Urine, Clean Catch; Future    Labile hypertension    Chronic kidney disease, stage 3    Myalgia    Acute left flank pain    Chronic seasonal allergic rhinitis due to pollen    Other orders  -     cetirizine (zyrTEC) 10 MG tablet; TAKE 1 TABLET BY MOUTH DAILY AS NEEDED FOR ALLERGIES  FOR ALLERGIES  -     Coenzyme Q10 100 MG tablet; 1-2 tablets daily        No visits with results within 3 Week(s) from this visit.   Latest known visit with results is:   Lab on 07/18/2018   Component Date Value Ref Range Status   • 25 Hydroxy, Vitamin D 07/18/2018 60.0  30.0 - 100.0 ng/ml Final   • PSA 07/18/2018 0.137  0.000 - 4.000 ng/mL Final   • WBC 07/18/2018 6.73  3.20 - 9.80 10*3/mm3 Final   • RBC 07/18/2018 4.68  4.37 - 5.74 10*6/mm3 Final   • Hemoglobin 07/18/2018 14.2  13.7 - 17.3 g/dL Final   • Hematocrit 07/18/2018 42.6  39.0 - 49.0 % Final   • MCV 07/18/2018 91.0  80.0 - 98.0 fL Final   • MCH 07/18/2018 30.3  26.5 - 34.0 pg Final   • MCHC 07/18/2018 33.3  31.5 - 36.3 g/dL Final   • RDW 07/18/2018 14.5  11.5 - 14.5 % Final   • RDW-SD 07/18/2018 47.3* 35.1 - 43.9 fl Final   • MPV 07/18/2018 11.6  8.0 - 12.0 fL Final   • Platelets 07/18/2018 177  150 - 450 10*3/mm3 Final   • Neutrophil % 07/18/2018 59.6  37.0 - 80.0 % Final   • Lymphocyte % 07/18/2018 25.4  10.0 - 50.0 % Final   • Monocyte % 07/18/2018 9.8  0.0 - 12.0 % Final   • Eosinophil % 07/18/2018 4.9  0.0 - 7.0 % Final   • Basophil % 07/18/2018 0.3  0.0 - 2.0 % Final   • Neutrophils, Absolute 07/18/2018 4.01  2.00 - 8.60 10*3/mm3 Final   • Lymphocytes, Absolute 07/18/2018 1.71  0.60 - 4.20 10*3/mm3 Final   • Monocytes, Absolute 07/18/2018 0.66  0.00 - 0.90 10*3/mm3 Final   • Eosinophils, Absolute 07/18/2018 0.33  0.00 - 0.70 10*3/mm3 Final   • Basophils, Absolute 07/18/2018 0.02  0.00 - 0.20 10*3/mm3 Final   • Glucose 07/18/2018 114* 74 - 99 mg/dL Final   • BUN 07/18/2018  22* 9 - 20 mg/dL Final   • Creatinine 07/18/2018 1.33* 0.66 - 1.25 mg/dL Final   • Sodium 07/18/2018 142  137 - 145 mmol/L Final   • Potassium 07/18/2018 4.0  3.4 - 5.0 mmol/L Final   • Chloride 07/18/2018 105  98 - 107 mmol/L Final   • CO2 07/18/2018 26.0  22.0 - 30.0 mmol/L Final   • Calcium 07/18/2018 9.3  8.4 - 10.2 mg/dL Final   • Total Protein 07/18/2018 6.8  6.3 - 8.2 g/dL Final   • Albumin 07/18/2018 4.10  3.50 - 5.00 g/dL Final   • ALT (SGPT) 07/18/2018 19  <=50 U/L Final   • AST (SGOT) 07/18/2018 17  17 - 59 U/L Final   • Alkaline Phosphatase 07/18/2018 95  38 - 126 U/L Final   • Total Bilirubin 07/18/2018 0.4  0.2 - 1.3 mg/dL Final   • eGFR Non African Amer 07/18/2018 52  42 - 98 mL/min/1.73 Final   • Globulin 07/18/2018 2.7  2.3 - 3.5 gm/dL Final   • A/G Ratio 07/18/2018 1.5  1.1 - 1.8 g/dL Final   • BUN/Creatinine Ratio 07/18/2018 16.5  7.0 - 25.0 Final   • Anion Gap 07/18/2018 11.0  5.0 - 15.0 mmol/L Final   • Hemoglobin A1C 07/18/2018 6.1* 4 - 5.6 % Final   • Total Cholesterol 07/18/2018 121* 150 - 200 mg/dL Final   • Triglycerides 07/18/2018 170* <=150 mg/dL Final   • HDL Cholesterol 07/18/2018 22* 40 - 59 mg/dL Final   • LDL Cholesterol  07/18/2018 65  <=100 mg/dL Final   • VLDL Cholesterol 07/18/2018 34  mg/dL Final   • LDL/HDL Ratio 07/18/2018 2.95  0.00 - 3.55 Final   • Uric Acid 07/18/2018 6.9  3.5 - 8.5 mg/dL Final   ]

## 2018-10-03 ENCOUNTER — CLINICAL SUPPORT (OUTPATIENT)
Dept: FAMILY MEDICINE CLINIC | Facility: CLINIC | Age: 78
End: 2018-10-03

## 2018-10-03 DIAGNOSIS — Z23 NEED FOR PROPHYLACTIC VACCINATION AND INOCULATION AGAINST INFLUENZA: ICD-10-CM

## 2018-10-03 PROCEDURE — G0008 ADMIN INFLUENZA VIRUS VAC: HCPCS | Performed by: INTERNAL MEDICINE

## 2018-10-03 PROCEDURE — 90662 IIV NO PRSV INCREASED AG IM: CPT | Performed by: INTERNAL MEDICINE

## 2018-10-05 RX ORDER — ESZOPICLONE 3 MG/1
TABLET, FILM COATED ORAL
Qty: 30 TABLET | Refills: 2 | OUTPATIENT
Start: 2018-10-05

## 2018-10-05 RX ORDER — ESZOPICLONE 3 MG/1
TABLET, FILM COATED ORAL
Qty: 30 TABLET | Refills: 2 | Status: SHIPPED | OUTPATIENT
Start: 2018-10-05 | End: 2018-11-01 | Stop reason: SDUPTHER

## 2018-10-09 RX ORDER — OMEPRAZOLE 40 MG/1
CAPSULE, DELAYED RELEASE ORAL
Qty: 60 CAPSULE | Refills: 11 | Status: SHIPPED | OUTPATIENT
Start: 2018-10-09 | End: 2019-10-07 | Stop reason: SDUPTHER

## 2018-10-09 RX ORDER — CETIRIZINE HYDROCHLORIDE 10 MG/1
10 TABLET ORAL DAILY PRN
Qty: 30 TABLET | Refills: 11 | Status: SHIPPED | OUTPATIENT
Start: 2018-10-09 | End: 2019-10-07 | Stop reason: SDUPTHER

## 2018-11-01 ENCOUNTER — OFFICE VISIT (OUTPATIENT)
Dept: FAMILY MEDICINE CLINIC | Facility: CLINIC | Age: 78
End: 2018-11-01

## 2018-11-01 VITALS
DIASTOLIC BLOOD PRESSURE: 78 MMHG | HEART RATE: 79 BPM | HEIGHT: 67 IN | WEIGHT: 261 LBS | BODY MASS INDEX: 40.97 KG/M2 | TEMPERATURE: 97.4 F | SYSTOLIC BLOOD PRESSURE: 140 MMHG | OXYGEN SATURATION: 97 %

## 2018-11-01 DIAGNOSIS — I10 ESSENTIAL HYPERTENSION: Primary | Chronic | ICD-10-CM

## 2018-11-01 DIAGNOSIS — E66.01 CLASS 3 SEVERE OBESITY DUE TO EXCESS CALORIES WITH SERIOUS COMORBIDITY AND BODY MASS INDEX (BMI) OF 40.0 TO 44.9 IN ADULT (HCC): Chronic | ICD-10-CM

## 2018-11-01 DIAGNOSIS — F51.9 NON-ORGANIC SLEEP DISORDER: Chronic | ICD-10-CM

## 2018-11-01 PROCEDURE — 99214 OFFICE O/P EST MOD 30 MIN: CPT | Performed by: INTERNAL MEDICINE

## 2018-11-01 RX ORDER — ESZOPICLONE 3 MG/1
TABLET, FILM COATED ORAL
Qty: 30 TABLET | Refills: 2 | Status: SHIPPED | OUTPATIENT
Start: 2018-11-01 | End: 2019-01-21 | Stop reason: CLARIF

## 2018-11-01 RX ORDER — CLONIDINE HYDROCHLORIDE 0.1 MG/1
TABLET ORAL
Qty: 60 TABLET | Refills: 6 | Status: SHIPPED | OUTPATIENT
Start: 2018-11-01

## 2018-11-01 NOTE — PROGRESS NOTES
Subjective     Eleno Savage is a 78 y.o. male.     History of Present Illness     Eleno is our very pleasant, very complex 78-year-old white male with multiple chronic medical issues including coronary artery disease, COPD, type 2 diabetes, chronic kidney disease, sleep apnea, hypertension, obesity and multiple other medical issues.  Dr. Farooq is his cardiologist.    The patient is concerned about his current blood pressure control.  He has brought a blood pressure diary for review.  The patient likes to see his systolic pressure around 115-120.  His blood pressure diary reveals those kind of numbers on several days, but his diastolic is usually around 60 when that's the case.  Further review of his blood pressure diary for the past month reveals systolics fluctuating from the 90s to 150s, with an average of approximately 130.  Diastolics typically range from the 50s to the upper 80s.  Overall, it appears that his systolic pressure is less than 140 approximately 80-90% of the time.  He was previously having some problems with episodes of symptomatic hypotension, but that mostly resolved when Dr. Farooq had him move his Imdur to evening dosing.    The patient admits to noncompliance with sodium restriction, reporting that he loves lots of salt on his foods.  We discussed the benefits of sodium restriction and weight loss.  His weight is up 6 pounds in the past 6 weeks.  He is morbidly obese.    The patient requests refill of Lunesta.  He takes Lunesta 3 mg every night in order to sleep.  He reports that he can't sleep without it or with a lesser dose.  He denies excessive sedation or sleep walking or other side effects.    Review of Systems   Constitutional: Negative for chills, fatigue and fever.   HENT: Negative for congestion, ear pain, postnasal drip, sinus pressure and sore throat.    Respiratory: Negative for cough, shortness of breath and wheezing.    Cardiovascular: Negative for chest pain,  "palpitations and leg swelling.   Gastrointestinal: Negative for abdominal pain, blood in stool, constipation, diarrhea, nausea and vomiting.   Endocrine: Negative for cold intolerance, heat intolerance, polydipsia and polyuria.   Genitourinary: Negative for dysuria, frequency, hematuria and urgency.   Skin: Negative for rash.   Neurological: Negative for syncope and weakness.   Psychiatric/Behavioral: Positive for sleep disturbance.       Objective     /78 (BP Location: Left arm, Patient Position: Sitting, Cuff Size: Adult)   Pulse 79   Temp 97.4 °F (36.3 °C) (Tympanic)   Ht 170.2 cm (67\")   Wt 118 kg (261 lb)   SpO2 97%   BMI 40.88 kg/m²     Physical Exam   Constitutional: He is oriented to person, place, and time. He appears well-developed and well-nourished. No distress.   Obese male.     HENT:   Head: Normocephalic and atraumatic.   Nose: Right sinus exhibits no maxillary sinus tenderness and no frontal sinus tenderness. Left sinus exhibits no maxillary sinus tenderness and no frontal sinus tenderness.   Mouth/Throat: Uvula is midline, oropharynx is clear and moist and mucous membranes are normal. No oral lesions. No tonsillar exudate.   Crowded posterior oropharynx   Eyes: Pupils are equal, round, and reactive to light. Conjunctivae and EOM are normal.   Neck: Trachea normal. Neck supple. No JVD present. Carotid bruit is not present. No tracheal deviation present. No thyroid mass and no thyromegaly present.   Cardiovascular: Normal rate, regular rhythm, normal heart sounds and intact distal pulses.   No extrasystoles are present. PMI is not displaced.    No murmur heard.  Pulmonary/Chest: Effort normal and breath sounds normal. No accessory muscle usage. No respiratory distress. He has no decreased breath sounds. He has no wheezes. He has no rhonchi. He has no rales.   Abdominal: Soft. Bowel sounds are normal. He exhibits no distension. There is no hepatosplenomegaly. There is no tenderness.   Obese " abdomen limits exam     Vascular Status -  His right foot exhibits abnormal foot vasculature  and abnormal foot edema (Trace edema on the right lower extremity with chronic venous insufficiency). His left foot exhibits abnormal foot vasculature  and abnormal foot edema.  Lymphadenopathy:     He has no cervical adenopathy.   Neurological: He is alert and oriented to person, place, and time. No cranial nerve deficit. Coordination normal.   Skin: Skin is warm, dry and intact. No rash noted. No cyanosis. Nails show no clubbing.   Psychiatric: He has a normal mood and affect. His speech is normal and behavior is normal. Judgment and thought content normal.   Vitals reviewed.      PHQ-2/PHQ-9 Depression Screening 11/1/2018   Little interest or pleasure in doing things 0   Feeling down, depressed, or hopeless 0   Total Score 0       Assessment/Plan     Continue the current aggressive multidrug therapy for hypertension.  Pursue aggressive sodium restriction and weight loss.  I have given him a prescription for clonidine 0.1 mg to be used as a rescue medication when systolic pressures greater than 140.  He will also continue to follow with Dr. Farooq.  Continue to monitor blood pressure at home and notify me if not at goal.  I explained that a systolic blood pressure goal of 115 is probably not in his best interest, rather suggesting a goal of systolic pressure averaging around 130, but without as many fluctuations.    I refilled Lunesta 3 mg daily at bedtime when necessary, although he uses it every night.Patient understands the risks associated with this controlled medication, including tolerance and addiction.  he also agrees to only obtain this medication from me, and not from a another provider, unless that provider is covering for me in my absence.  he also agrees to be compliant in dosing, and not self adjust the dose of medication.  A signed controlled substance agreement is on file, and he has received a controlled  substance education sheet at this a previous visit.  he has also signed a consent for treatment with a controlled substance as per [facility] policy. NEFTALY was obtained.    Diagnoses and all orders for this visit:    Essential hypertension    Non-organic sleep disorder    Class 3 severe obesity due to excess calories with serious comorbidity and body mass index (BMI) of 40.0 to 44.9 in adult (CMS/[facility])    Other orders  -     eszopiclone (LUNESTA) 3 MG tablet; Take immediately before bedtime  -     CloNIDine (CATAPRES) 0.1 MG tablet; Take 1 tablet as needed for systolic blood pressure greater than 140. May repeat in 4 hours if needed. Maximum of 2 doses daily.        No visits with results within 3 Week(s) from this visit.   Latest known visit with results is:   Lab on 09/24/2018   Component Date Value Ref Range Status   • Color, UA 09/24/2018 Yellow  Yellow, Straw Final   • Appearance, UA 09/24/2018 Clear  Clear Final   • pH, UA 09/24/2018 5.5  5.5 - 8.0 Final   • Specific Gravity, UA 09/24/2018 1.020  1.005 - 1.030 Final   • Glucose, UA 09/24/2018 Negative  Negative Final   • Ketones, UA 09/24/2018 Negative  Negative Final   • Bilirubin, UA 09/24/2018 Negative  Negative Final   • Blood, UA 09/24/2018 Negative  Negative Final   • Protein, UA 09/24/2018 Negative  Negative Final   • Leuk Esterase, UA 09/24/2018 Negative  Negative Final   • Nitrite, UA 09/24/2018 Negative  Negative Final   • Urobilinogen, UA 09/24/2018 0.2 E.U./dL  0.2 - 1.0 E.U./dL Final   ]

## 2018-11-19 RX ORDER — TRAZODONE HYDROCHLORIDE 150 MG/1
TABLET ORAL
Qty: 30 TABLET | Refills: 11 | OUTPATIENT
Start: 2018-11-19

## 2019-01-16 RX ORDER — NITROGLYCERIN 0.4 MG/1
TABLET SUBLINGUAL
Qty: 25 TABLET | Refills: 11 | Status: SHIPPED | OUTPATIENT
Start: 2019-01-16 | End: 2021-01-18

## 2019-01-21 ENCOUNTER — TELEPHONE (OUTPATIENT)
Dept: FAMILY MEDICINE CLINIC | Facility: CLINIC | Age: 79
End: 2019-01-21

## 2019-01-21 ENCOUNTER — LAB (OUTPATIENT)
Dept: LAB | Facility: OTHER | Age: 79
End: 2019-01-21

## 2019-01-21 DIAGNOSIS — E79.0 HYPERURICEMIA: Chronic | ICD-10-CM

## 2019-01-21 DIAGNOSIS — E11.22 TYPE 2 DIABETES MELLITUS WITH STAGE 2 CHRONIC KIDNEY DISEASE, WITHOUT LONG-TERM CURRENT USE OF INSULIN (HCC): Chronic | ICD-10-CM

## 2019-01-21 DIAGNOSIS — E78.2 MIXED HYPERLIPIDEMIA: Chronic | ICD-10-CM

## 2019-01-21 DIAGNOSIS — N18.2 TYPE 2 DIABETES MELLITUS WITH STAGE 2 CHRONIC KIDNEY DISEASE, WITHOUT LONG-TERM CURRENT USE OF INSULIN (HCC): Chronic | ICD-10-CM

## 2019-01-21 DIAGNOSIS — E55.9 VITAMIN D DEFICIENCY: Chronic | ICD-10-CM

## 2019-01-21 LAB
25(OH)D3 SERPL-MCNC: 50.3 NG/ML (ref 30–100)
ALBUMIN SERPL-MCNC: 4.4 G/DL (ref 3.5–5)
ALBUMIN/GLOB SERPL: 1.5 G/DL (ref 1.1–1.8)
ALP SERPL-CCNC: 100 U/L (ref 38–126)
ALT SERPL W P-5'-P-CCNC: 29 U/L
ANION GAP SERPL CALCULATED.3IONS-SCNC: 8 MMOL/L (ref 5–15)
ARTICHOKE IGE QN: 84 MG/DL (ref 1–129)
AST SERPL-CCNC: 25 U/L (ref 17–59)
BASOPHILS # BLD AUTO: 0.02 10*3/MM3 (ref 0–0.2)
BASOPHILS NFR BLD AUTO: 0.3 % (ref 0–2)
BILIRUB SERPL-MCNC: 0.4 MG/DL (ref 0.2–1.3)
BUN BLD-MCNC: 28 MG/DL (ref 9–20)
BUN/CREAT SERPL: 18.8 (ref 7–25)
CALCIUM SPEC-SCNC: 9.7 MG/DL (ref 8.4–10.2)
CHLORIDE SERPL-SCNC: 107 MMOL/L (ref 98–107)
CO2 SERPL-SCNC: 30 MMOL/L (ref 22–30)
CREAT BLD-MCNC: 1.49 MG/DL (ref 0.66–1.25)
DEPRECATED RDW RBC AUTO: 47.8 FL (ref 35.1–43.9)
EOSINOPHIL # BLD AUTO: 0.27 10*3/MM3 (ref 0–0.7)
EOSINOPHIL NFR BLD AUTO: 4 % (ref 0–7)
ERYTHROCYTE [DISTWIDTH] IN BLOOD BY AUTOMATED COUNT: 14.7 % (ref 11.5–14.5)
GFR SERPL CREATININE-BSD FRML MDRD: 45 ML/MIN/1.73 (ref 42–98)
GLOBULIN UR ELPH-MCNC: 3 GM/DL (ref 2.3–3.5)
GLUCOSE BLD-MCNC: 126 MG/DL (ref 74–99)
HBA1C MFR BLD: 6.2 % (ref 4–5.6)
HCT VFR BLD AUTO: 45.4 % (ref 39–49)
HGB BLD-MCNC: 14.8 G/DL (ref 13.7–17.3)
LYMPHOCYTES # BLD AUTO: 2.05 10*3/MM3 (ref 0.6–4.2)
LYMPHOCYTES NFR BLD AUTO: 30.5 % (ref 10–50)
MCH RBC QN AUTO: 29.8 PG (ref 26.5–34)
MCHC RBC AUTO-ENTMCNC: 32.6 G/DL (ref 31.5–36.3)
MCV RBC AUTO: 91.5 FL (ref 80–98)
MONOCYTES # BLD AUTO: 0.64 10*3/MM3 (ref 0–0.9)
MONOCYTES NFR BLD AUTO: 9.5 % (ref 0–12)
NEUTROPHILS # BLD AUTO: 3.74 10*3/MM3 (ref 2–8.6)
NEUTROPHILS NFR BLD AUTO: 55.7 % (ref 37–80)
PLATELET # BLD AUTO: 212 10*3/MM3 (ref 150–450)
PMV BLD AUTO: 11.2 FL (ref 8–12)
POTASSIUM BLD-SCNC: 4.1 MMOL/L (ref 3.4–5)
PROT SERPL-MCNC: 7.4 G/DL (ref 6.3–8.2)
RBC # BLD AUTO: 4.96 10*6/MM3 (ref 4.37–5.74)
SODIUM BLD-SCNC: 145 MMOL/L (ref 137–145)
TSH SERPL DL<=0.05 MIU/L-ACNC: 2.07 MIU/ML (ref 0.46–4.68)
URATE SERPL-MCNC: 6.6 MG/DL (ref 3.5–8.5)
WBC NRBC COR # BLD: 6.72 10*3/MM3 (ref 3.2–9.8)

## 2019-01-21 PROCEDURE — 84550 ASSAY OF BLOOD/URIC ACID: CPT | Performed by: INTERNAL MEDICINE

## 2019-01-21 PROCEDURE — 83036 HEMOGLOBIN GLYCOSYLATED A1C: CPT | Performed by: INTERNAL MEDICINE

## 2019-01-21 PROCEDURE — 84443 ASSAY THYROID STIM HORMONE: CPT | Performed by: INTERNAL MEDICINE

## 2019-01-21 PROCEDURE — 80053 COMPREHEN METABOLIC PANEL: CPT | Performed by: INTERNAL MEDICINE

## 2019-01-21 PROCEDURE — 36415 COLL VENOUS BLD VENIPUNCTURE: CPT | Performed by: INTERNAL MEDICINE

## 2019-01-21 PROCEDURE — 82306 VITAMIN D 25 HYDROXY: CPT | Performed by: INTERNAL MEDICINE

## 2019-01-21 PROCEDURE — 85025 COMPLETE CBC W/AUTO DIFF WBC: CPT | Performed by: INTERNAL MEDICINE

## 2019-01-21 PROCEDURE — 83721 ASSAY OF BLOOD LIPOPROTEIN: CPT | Performed by: INTERNAL MEDICINE

## 2019-01-21 NOTE — TELEPHONE ENCOUNTER
I spoke to patient regarding his Lunesta and insurance denying and request formulary drug Belsomra. He said he would  later this month. TP

## 2019-01-28 ENCOUNTER — OFFICE VISIT (OUTPATIENT)
Dept: FAMILY MEDICINE CLINIC | Facility: CLINIC | Age: 79
End: 2019-01-28

## 2019-01-28 VITALS
DIASTOLIC BLOOD PRESSURE: 60 MMHG | SYSTOLIC BLOOD PRESSURE: 138 MMHG | HEART RATE: 76 BPM | WEIGHT: 265.8 LBS | HEIGHT: 67 IN | BODY MASS INDEX: 41.72 KG/M2 | TEMPERATURE: 97.7 F | OXYGEN SATURATION: 97 %

## 2019-01-28 DIAGNOSIS — M10.30 GOUT DUE TO RENAL IMPAIRMENT, UNSPECIFIED CHRONICITY, UNSPECIFIED SITE: Chronic | ICD-10-CM

## 2019-01-28 DIAGNOSIS — I25.10 CORONARY ARTERIOSCLEROSIS: Chronic | ICD-10-CM

## 2019-01-28 DIAGNOSIS — E55.9 VITAMIN D DEFICIENCY: Chronic | ICD-10-CM

## 2019-01-28 DIAGNOSIS — K21.9 GASTROESOPHAGEAL REFLUX DISEASE, ESOPHAGITIS PRESENCE NOT SPECIFIED: Chronic | ICD-10-CM

## 2019-01-28 DIAGNOSIS — I10 ESSENTIAL HYPERTENSION: Primary | Chronic | ICD-10-CM

## 2019-01-28 DIAGNOSIS — J43.2 CENTRILOBULAR EMPHYSEMA (HCC): Chronic | ICD-10-CM

## 2019-01-28 DIAGNOSIS — E78.2 MIXED HYPERLIPIDEMIA: Chronic | ICD-10-CM

## 2019-01-28 DIAGNOSIS — E78.1 HYPERTRIGLYCERIDEMIA: Chronic | ICD-10-CM

## 2019-01-28 DIAGNOSIS — E66.01 CLASS 3 SEVERE OBESITY DUE TO EXCESS CALORIES WITH SERIOUS COMORBIDITY AND BODY MASS INDEX (BMI) OF 40.0 TO 44.9 IN ADULT (HCC): Chronic | ICD-10-CM

## 2019-01-28 DIAGNOSIS — G47.33 OBSTRUCTIVE SLEEP APNEA SYNDROME: Chronic | ICD-10-CM

## 2019-01-28 DIAGNOSIS — G47.00 INSOMNIA, UNSPECIFIED TYPE: Chronic | ICD-10-CM

## 2019-01-28 DIAGNOSIS — E79.0 HYPERURICEMIA: Chronic | ICD-10-CM

## 2019-01-28 DIAGNOSIS — N18.30 CHRONIC KIDNEY DISEASE, STAGE 3 (HCC): Chronic | ICD-10-CM

## 2019-01-28 DIAGNOSIS — N18.2 TYPE 2 DIABETES MELLITUS WITH STAGE 2 CHRONIC KIDNEY DISEASE, WITHOUT LONG-TERM CURRENT USE OF INSULIN (HCC): Chronic | ICD-10-CM

## 2019-01-28 DIAGNOSIS — Z12.5 SPECIAL SCREENING FOR MALIGNANT NEOPLASM OF PROSTATE: ICD-10-CM

## 2019-01-28 DIAGNOSIS — E11.22 TYPE 2 DIABETES MELLITUS WITH STAGE 2 CHRONIC KIDNEY DISEASE, WITHOUT LONG-TERM CURRENT USE OF INSULIN (HCC): Chronic | ICD-10-CM

## 2019-01-28 PROCEDURE — 99214 OFFICE O/P EST MOD 30 MIN: CPT | Performed by: INTERNAL MEDICINE

## 2019-01-28 RX ORDER — GABAPENTIN 600 MG/1
TABLET ORAL
Qty: 30 TABLET | Refills: 5 | Status: SHIPPED | OUTPATIENT
Start: 2019-01-28 | End: 2019-07-30 | Stop reason: SDUPTHER

## 2019-01-28 NOTE — PROGRESS NOTES
Subjective        History of Present Illness     Eleno Savage is a 79 y.o. male who presents for 6-month follow up on type 2 diabetes mellitus, stage 2 chronic kidney disease, obesity, COPD, GERD, vitamin D deficiency, and high cholesterol/high triglycerides.  He follows with Dr. Weathers, nephrologist annually for CKD.  Dr. Mesa is his cardiologist.  He denies chest pain.  He has obstructive sleep apnea and continues to report compliance with CPAP.  GERD symptoms adequately controlled with Prilosec.  He continues on prophylactic allopurinol.  Denies recent gout flares.      He continues on Neurontin for diabetic neuropathy and denies significant side effects including excessive daytime sedation.  Patient understands the risks associated with this controlled medication, including tolerance and addiction.      He was seen at [facility] 12/23/18 for COPD exacerbation.  He was prescribed Z-pack, prednisone 20 mg, Tessalon Perles, and albuterol. He reports gradual improvement, but continues to have a frequent paroxysmal cough and has been doing two nebulizer treatments daily.  He continues using his Advair inhaler.       Six months ago, I had him stop Lipitor and due to myalgia complaints and start Crestor 20 mg q.o.d. LDL is slightly higher over the past six months, more than likely due to weight gain and holiday eating.     He continues with Dr. Jansen, optometrist, for annual diabetic eye exams.  He had cataract removal by Dr. Licona, ophthalmologist.      Weight is up 5 pounds in the past six months.  Blood pressure at goal.       He continues to have difficulty with sleep without sleep aids.  Lunesta is no longer covered under insurance formulary.  He is given a prescription for Belsomra.      The patient's relevant past medical, surgical, and social history was reviewed in Epic.   Lab results are reviewed with the patient today.  CBC unremarkable. Fasting glucose 126.  A1c is 62.  LDL  "increased at 84 with Crestor, more than likely due to holiday eating.   Vitamin D at goal with oral supplement q.o.d.     Review of Systems   Constitutional: Negative for chills, fatigue and fever.   HENT: Positive for postnasal drip. Negative for congestion, ear pain, sinus pressure and sore throat.    Respiratory: Positive for cough. Negative for shortness of breath and wheezing.    Cardiovascular: Negative for chest pain, palpitations and leg swelling.   Gastrointestinal: Negative for abdominal pain, blood in stool, constipation, diarrhea, nausea and vomiting.   Endocrine: Negative for cold intolerance, heat intolerance, polydipsia and polyuria.   Genitourinary: Negative for dysuria, frequency, hematuria and urgency.   Skin: Negative for rash.   Neurological: Negative for syncope and weakness.        Objective     Vitals:    01/28/19 1007   BP: 138/60   Pulse: 76   Temp: 97.7 °F (36.5 °C)   TempSrc: Oral   SpO2: 97%   Weight: 121 kg (265 lb 12.8 oz)   Height: 170.2 cm (67\")     Physical Exam   Constitutional: He is oriented to person, place, and time. He appears well-developed and well-nourished. No distress.   Morbidly obese male accompanied by his wife.    HENT:   Head: Normocephalic and atraumatic.   Nose: Right sinus exhibits no maxillary sinus tenderness and no frontal sinus tenderness. Left sinus exhibits no maxillary sinus tenderness and no frontal sinus tenderness.   Mouth/Throat: Uvula is midline, oropharynx is clear and moist and mucous membranes are normal. No oral lesions. No tonsillar exudate.   Nasal congestion and clear postnasal drip.    Eyes: Conjunctivae and EOM are normal. Pupils are equal, round, and reactive to light.   Neck: Trachea normal. Neck supple. No JVD present. Carotid bruit is not present. No tracheal deviation present. No thyroid mass and no thyromegaly present.   Cardiovascular: Normal rate, regular rhythm, normal heart sounds and intact distal pulses.  No extrasystoles are " present. PMI is not displaced.   No murmur heard.  Pulmonary/Chest: Effort normal and breath sounds normal. No accessory muscle usage. No respiratory distress. He has no decreased breath sounds. He has no wheezes. He has no rhonchi. He has no rales.   Abdominal: Soft. Bowel sounds are normal. He exhibits no distension. There is no hepatosplenomegaly. There is no tenderness.   Obese abdomen limits exam.      Vascular Status -  His right foot exhibits abnormal foot vasculature  (Diminihsed pulses bilateral lower extremities. ) and abnormal foot edema (Trace edema bilateral ankles.). His left foot exhibits abnormal foot vasculature  and abnormal foot edema.  Lymphadenopathy:     He has no cervical adenopathy.   Neurological: He is alert and oriented to person, place, and time. No cranial nerve deficit. Coordination normal.   Skin: Skin is warm, dry and intact. No rash noted. No cyanosis. Nails show no clubbing.   Psychiatric: He has a normal mood and affect. His speech is normal and behavior is normal. Judgment and thought content normal.   Vitals reviewed.          Assessment/Plan      Continue diet control to manage diabetes. Pursue diabetic diet, exercise, and weight loss efforts. Prescription is sent for Neurontin 600 mg to use one q.h.s. Written literature regarding diet and exercise included in patient's AVS today.  A prescription is given for Neurontin.  Patient understands the risks associated with this controlled medication, including tolerance and addiction.  He also agrees to only obtain this medication from me, and not from a another provider, unless that provider is covering for me in my absence.  He also agrees to be compliant in dosing, and not self adjust the dose of medication.  A signed controlled substance agreement is on file, and he has received a controlled substance education sheet at this a previous visit.  He has also signed a consent for treatment with a controlled substance as per [facility]  health policy. NEFTALY was obtained.     Continue omeprazole to manage GERD symptoms.      Continue with nebulizer treatments and use of inhalers.     A prescription is given for Belsomra to take 1/2 to 1 p.r.n. Sleep.     Continue to avoid NSAIDs and other chronic nephrotoxic medications.   Continue follow up visits with Dr. Weathers, nephrologist.      Continue other medications and vitamin and mineral supplements to treat additional medical problems which we addressed today.  Return in six months for follow up with fasting labs one week prior.        Intensify diet, exercise, and weight loss efforts.  Written literature regarding weight loss and exercise included in AVS today.       Scribed for Dr. Israel by Lacy Bustos [facility].    Diagnoses and all orders for this visit:    Essential hypertension  -     CBC Auto Differential; Future  -     Comprehensive Metabolic Panel; Future    Coronary arteriosclerosis - followed by Dr. Farooq    Mixed hyperlipidemia  -     Comprehensive Metabolic Panel; Future  -     LDL Cholesterol, Direct; Future    Hypertriglyceridemia    Centrilobular emphysema (CMS/HCC)    Obstructive sleep apnea syndrome    Gastroesophageal reflux disease, esophagitis presence not specified    Vitamin D deficiency  -     Vitamin D 25 Hydroxy; Future    Class 3 severe obesity due to excess calories with serious comorbidity and body mass index (BMI) of 40.0 to 44.9 in adult (CMS/HCC)    Gout due to renal impairment, unspecified chronicity, unspecified site  -     Uric Acid; Future    Hyperuricemia  -     Uric Acid; Future    Insomnia, unspecified type    Type 2 diabetes mellitus with stage 2 chronic kidney disease, without long-term current use of insulin (CMS/HCC)  -     CBC Auto Differential; Future  -     Comprehensive Metabolic Panel; Future  -     Hemoglobin A1c; Future  -     Microalbumin / Creatinine Urine Ratio - Urine, Clean Catch; Future    Special screening for malignant neoplasm of  prostate  -     PSA Screen; Future    Chronic kidney disease, stage 3 (CMS/HCC)    Other orders  -     gabapentin (NEURONTIN) 600 MG tablet; 1 every night        Lab on 01/21/2019   Component Date Value Ref Range Status   • WBC 01/21/2019 6.72  3.20 - 9.80 10*3/mm3 Final   • RBC 01/21/2019 4.96  4.37 - 5.74 10*6/mm3 Final   • Hemoglobin 01/21/2019 14.8  13.7 - 17.3 g/dL Final   • Hematocrit 01/21/2019 45.4  39.0 - 49.0 % Final   • MCV 01/21/2019 91.5  80.0 - 98.0 fL Final   • MCH 01/21/2019 29.8  26.5 - 34.0 pg Final   • MCHC 01/21/2019 32.6  31.5 - 36.3 g/dL Final   • RDW 01/21/2019 14.7* 11.5 - 14.5 % Final   • RDW-SD 01/21/2019 47.8* 35.1 - 43.9 fl Final   • MPV 01/21/2019 11.2  8.0 - 12.0 fL Final   • Platelets 01/21/2019 212  150 - 450 10*3/mm3 Final   • Neutrophil % 01/21/2019 55.7  37.0 - 80.0 % Final   • Lymphocyte % 01/21/2019 30.5  10.0 - 50.0 % Final   • Monocyte % 01/21/2019 9.5  0.0 - 12.0 % Final   • Eosinophil % 01/21/2019 4.0  0.0 - 7.0 % Final   • Basophil % 01/21/2019 0.3  0.0 - 2.0 % Final   • Neutrophils, Absolute 01/21/2019 3.74  2.00 - 8.60 10*3/mm3 Final   • Lymphocytes, Absolute 01/21/2019 2.05  0.60 - 4.20 10*3/mm3 Final   • Monocytes, Absolute 01/21/2019 0.64  0.00 - 0.90 10*3/mm3 Final   • Eosinophils, Absolute 01/21/2019 0.27  0.00 - 0.70 10*3/mm3 Final   • Basophils, Absolute 01/21/2019 0.02  0.00 - 0.20 10*3/mm3 Final   • Glucose 01/21/2019 126* 74 - 99 mg/dL Final   • BUN 01/21/2019 28* 9 - 20 mg/dL Final   • Creatinine 01/21/2019 1.49* 0.66 - 1.25 mg/dL Final   • Sodium 01/21/2019 145  137 - 145 mmol/L Final   • Potassium 01/21/2019 4.1  3.4 - 5.0 mmol/L Final   • Chloride 01/21/2019 107  98 - 107 mmol/L Final   • CO2 01/21/2019 30.0  22.0 - 30.0 mmol/L Final   • Calcium 01/21/2019 9.7  8.4 - 10.2 mg/dL Final   • Total Protein 01/21/2019 7.4  6.3 - 8.2 g/dL Final   • Albumin 01/21/2019 4.40  3.50 - 5.00 g/dL Final   • ALT (SGPT) 01/21/2019 29  <=50 U/L Final   • AST (SGOT) 01/21/2019  25  17 - 59 U/L Final   • Alkaline Phosphatase 01/21/2019 100  38 - 126 U/L Final   • Total Bilirubin 01/21/2019 0.4  0.2 - 1.3 mg/dL Final   • eGFR Non African Amer 01/21/2019 45  42 - 98 mL/min/1.73 Final   • Globulin 01/21/2019 3.0  2.3 - 3.5 gm/dL Final   • A/G Ratio 01/21/2019 1.5  1.1 - 1.8 g/dL Final   • BUN/Creatinine Ratio 01/21/2019 18.8  7.0 - 25.0 Final   • Anion Gap 01/21/2019 8.0  5.0 - 15.0 mmol/L Final   • Hemoglobin A1C 01/21/2019 6.2* 4 - 5.6 % Final   • LDL Cholesterol  01/21/2019 84  1 - 129 mg/dL Final   • 25 Hydroxy, Vitamin D 01/21/2019 50.3  30.0 - 100.0 ng/ml Final   • TSH 01/21/2019 2.070  0.460 - 4.680 mIU/mL Final   • Uric Acid 01/21/2019 6.6  3.5 - 8.5 mg/dL Final   ]

## 2019-01-28 NOTE — PATIENT INSTRUCTIONS
Exercising to Lose Weight  Exercising can help you to lose weight. In order to lose weight through exercise, you need to do vigorous-intensity exercise. You can tell that you are exercising with vigorous intensity if you are breathing very hard and fast and cannot hold a conversation while exercising.  Moderate-intensity exercise helps to maintain your current weight. You can tell that you are exercising at a moderate level if you have a higher heart rate and faster breathing, but you are still able to hold a conversation.  How often should I exercise?  Choose an activity that you enjoy and set realistic goals. Your health care provider can help you to make an activity plan that works for you. Exercise regularly as directed by your health care provider. This may include:  · Doing resistance training twice each week, such as:  ? Push-ups.  ? Sit-ups.  ? Lifting weights.  ? Using resistance bands.  · Doing a given intensity of exercise for a given amount of time. Choose from these options:  ? 150 minutes of moderate-intensity exercise every week.  ? 75 minutes of vigorous-intensity exercise every week.  ? A mix of moderate-intensity and vigorous-intensity exercise every week.    Children, pregnant women, people who are out of shape, people who are overweight, and older adults may need to consult a health care provider for individual recommendations. If you have any sort of medical condition, be sure to consult your health care provider before starting a new exercise program.  What are some activities that can help me to lose weight?  · Walking at a rate of at least 4.5 miles an hour.  · Jogging or running at a rate of 5 miles per hour.  · Biking at a rate of at least 10 miles per hour.  · Lap swimming.  · Roller-skating or in-line skating.  · Cross-country skiing.  · Vigorous competitive sports, such as football, basketball, and soccer.  · Jumping rope.  · Aerobic dancing.  How can I be more active in my day-to-day  activities?  · Use the stairs instead of the elevator.  · Take a walk during your lunch break.  · If you drive, park your car farther away from work or school.  · If you take public transportation, get off one stop early and walk the rest of the way.  · Make all of your phone calls while standing up and walking around.  · Get up, stretch, and walk around every 30 minutes throughout the day.  What guidelines should I follow while exercising?  · Do not exercise so much that you hurt yourself, feel dizzy, or get very short of breath.  · Consult your health care provider prior to starting a new exercise program.  · Wear comfortable clothes and shoes with good support.  · Drink plenty of water while you exercise to prevent dehydration or heat stroke. Body water is lost during exercise and must be replaced.  · Work out until you breathe faster and your heart beats faster.  This information is not intended to replace advice given to you by your health care provider. Make sure you discuss any questions you have with your health care provider.  Document Released: 01/20/2012 Document Revised: 05/25/2017 Document Reviewed: 05/21/2015  [employer] Interactive Patient Education © 2018 [employer]      Calorie Counting for Weight Loss  Calories are units of energy. Your body needs a certain amount of calories from food to keep you going throughout the day. When you eat more calories than your body needs, your body stores the extra calories as fat. When you eat fewer calories than your body needs, your body burns fat to get the energy it needs.  Calorie counting means keeping track of how many calories you eat and drink each day. Calorie counting can be helpful if you need to lose weight. If you make sure to eat fewer calories than your body needs, you should lose weight. Ask your health care provider what a healthy weight is for you.  For calorie counting to work, you will need to eat the right number of calories in a day in order  to lose a healthy amount of weight per week. A dietitian can help you determine how many calories you need in a day and will give you suggestions on how to reach your calorie goal.  · A healthy amount of weight to lose per week is usually 1-2 lb (0.5-0.9 kg). This usually means that your daily calorie intake should be reduced by 500-750 calories.  · Eating 1,200 - 1,500 calories per day can help most women lose weight.  · Eating 1,500 - 1,800 calories per day can help most men lose weight.    What do I need to know about calorie counting?  In order to meet your daily calorie goal, you will need to:  · Find out how many calories are in each food you would like to eat. Try to do this before you eat.  · Decide how much of the food you plan to eat.  · Write down what you ate and how many calories it had. Doing this is called keeping a food log.    To successfully lose weight, it is important to balance calorie counting with a healthy lifestyle that includes regular activity. Aim for 150 minutes of moderate exercise (such as walking) or 75 minutes of vigorous exercise (such as running) each week.  Where do I find calorie information?    The number of calories in a food can be found on a Nutrition Facts label. If a food does not have a Nutrition Facts label, try to look up the calories online or ask your dietitian for help.  Remember that calories are listed per serving. If you choose to have more than one serving of a food, you will have to multiply the calories per serving by the amount of servings you plan to eat. For example, the label on a package of bread might say that a serving size is 1 slice and that there are 90 calories in a serving. If you eat 1 slice, you will have eaten 90 calories. If you eat 2 slices, you will have eaten 180 calories.  How do I keep a food log?  Immediately after each meal, record the following information in your food log:  · What you ate. Don't forget to include toppings, sauces, and  "other extras on the food.  · How much you ate. This can be measured in cups, ounces, or number of items.  · How many calories each food and drink had.  · The total number of calories in the meal.    Keep your food log near you, such as in a small notebook in your pocket, or use a mobile kelby or website. Some programs will calculate calories for you and show you how many calories you have left for the day to meet your goal.  What are some calorie counting tips?  · Use your calories on foods and drinks that will fill you up and not leave you hungry:  ? Some examples of foods that fill you up are nuts and nut butters, vegetables, lean proteins, and high-fiber foods like whole grains. High-fiber foods are foods with more than 5 g fiber per serving.  ? Drinks such as sodas, specialty coffee drinks, alcohol, and juices have a lot of calories, yet do not fill you up.  · Eat nutritious foods and avoid empty calories. Empty calories are calories you get from foods or beverages that do not have many vitamins or protein, such as candy, sweets, and soda. It is better to have a nutritious high-calorie food (such as an avocado) than a food with few nutrients (such as a bag of chips).  · Know how many calories are in the foods you eat most often. This will help you calculate calorie counts faster.  · Pay attention to calories in drinks. Low-calorie drinks include water and unsweetened drinks.  · Pay attention to nutrition labels for \"low fat\" or \"fat free\" foods. These foods sometimes have the same amount of calories or more calories than the full fat versions. They also often have added sugar, starch, or salt, to make up for flavor that was removed with the fat.  · Find a way of tracking calories that works for you. Get creative. Try different apps or programs if writing down calories does not work for you.  What are some portion control tips?  · Know how many calories are in a serving. This will help you know how many servings of " a certain food you can have.  · Use a measuring cup to measure serving sizes. You could also try weighing out portions on a kitchen scale. With time, you will be able to estimate serving sizes for some foods.  · Take some time to put servings of different foods on your favorite plates, bowls, and cups so you know what a serving looks like.  · Try not to eat straight from a bag or box. Doing this can lead to overeating. Put the amount you would like to eat in a cup or on a plate to make sure you are eating the right portion.  · Use smaller plates, glasses, and bowls to prevent overeating.  · Try not to multitask (for example, watch TV or use your computer) while eating. If it is time to eat, sit down at a table and enjoy your food. This will help you to know when you are full. It will also help you to be aware of what you are eating and how much you are eating.  What are tips for following this plan?  Reading food labels  · Check the calorie count compared to the serving size. The serving size may be smaller than what you are used to eating.  · Check the source of the calories. Make sure the food you are eating is high in vitamins and protein and low in saturated and trans fats.  Shopping  · Read nutrition labels while you shop. This will help you make healthy decisions before you decide to purchase your food.  · Make a grocery list and stick to it.  Cooking  · Try to cook your favorite foods in a healthier way. For example, try baking instead of frying.  · Use low-fat dairy products.  Meal planning  · Use more fruits and vegetables. Half of your plate should be fruits and vegetables.  · Include lean proteins like poultry and fish.  How do I count calories when eating out?  · Ask for smaller portion sizes.  · Consider sharing an entree and sides instead of getting your own entree.  · If you get your own entree, eat only half. Ask for a box at the beginning of your meal and put the rest of your entree in it so you are  not tempted to eat it.  · If calories are listed on the menu, choose the lower calorie options.  · Choose dishes that include vegetables, fruits, whole grains, low-fat dairy products, and lean protein.  · Choose items that are boiled, broiled, grilled, or steamed. Stay away from items that are buttered, battered, fried, or served with cream sauce. Items labeled “crispy” are usually fried, unless stated otherwise.  · Choose water, low-fat milk, unsweetened iced tea, or other drinks without added sugar. If you want an alcoholic beverage, choose a lower calorie option such as a glass of wine or light beer.  · Ask for dressings, sauces, and syrups on the side. These are usually high in calories, so you should limit the amount you eat.  · If you want a salad, choose a garden salad and ask for grilled meats. Avoid extra toppings like baum, cheese, or fried items. Ask for the dressing on the side, or ask for olive oil and vinegar or lemon to use as dressing.  · Estimate how many servings of a food you are given. For example, a serving of cooked rice is ½ cup or about the size of half a baseball. Knowing serving sizes will help you be aware of how much food you are eating at restaurants. The list below tells you how big or small some common portion sizes are based on everyday objects:  ? 1 oz--4 stacked dice.  ? 3 oz--1 deck of cards.  ? 1 tsp--1 die.  ? 1 Tbsp--½ a ping-pong ball.  ? 2 Tbsp--1 ping-pong ball.  ? ½ cup--½ baseball.  ? 1 cup--1 baseball.  Summary  · Calorie counting means keeping track of how many calories you eat and drink each day. If you eat fewer calories than your body needs, you should lose weight.  · A healthy amount of weight to lose per week is usually 1-2 lb (0.5-0.9 kg). This usually means reducing your daily calorie intake by 500-750 calories.  · The number of calories in a food can be found on a Nutrition Facts label. If a food does not have a Nutrition Facts label, try to look up the calories  online or ask your dietitian for help.  · Use your calories on foods and drinks that will fill you up, and not on foods and drinks that will leave you hungry.  · Use smaller plates, glasses, and bowls to prevent overeating.  This information is not intended to replace advice given to you by your health care provider. Make sure you discuss any questions you have with your health care provider.  Document Released: 12/18/2006 Document Revised: 11/17/2017 Document Reviewed: 11/17/2017  [employer] Interactive Patient Education © 2018 [employer]

## 2019-04-15 ENCOUNTER — LAB (OUTPATIENT)
Dept: LAB | Facility: OTHER | Age: 79
End: 2019-04-15

## 2019-04-15 DIAGNOSIS — N18.30 CHRONIC KIDNEY DISEASE, STAGE III (MODERATE) (HCC): ICD-10-CM

## 2019-04-15 LAB
25(OH)D3 SERPL-MCNC: 46.4 NG/ML (ref 30–100)
ALBUMIN SERPL-MCNC: 4 G/DL (ref 3.5–5)
ANION GAP SERPL CALCULATED.3IONS-SCNC: 9 MMOL/L (ref 5–15)
BUN BLD-MCNC: 24 MG/DL (ref 7–23)
BUN/CREAT SERPL: 17.3 (ref 7–25)
CALCIUM SPEC-SCNC: 9.5 MG/DL (ref 8.4–10.2)
CHLORIDE SERPL-SCNC: 104 MMOL/L (ref 101–112)
CO2 SERPL-SCNC: 29 MMOL/L (ref 22–30)
CREAT BLD-MCNC: 1.39 MG/DL (ref 0.7–1.3)
CREAT UR-MCNC: 66.2 MG/DL
GFR SERPL CREATININE-BSD FRML MDRD: 49 ML/MIN/1.73 (ref 42–98)
GLUCOSE BLD-MCNC: 122 MG/DL (ref 70–99)
PHOSPHATE SERPL-MCNC: 3.3 MG/DL (ref 2.5–4.5)
POTASSIUM BLD-SCNC: 4.1 MMOL/L (ref 3.4–5)
PROT UR-MCNC: 5 MG/DL
PROT/CREAT UR: 75.5 MG/G CREA (ref 0–200)
SODIUM BLD-SCNC: 142 MMOL/L (ref 137–145)

## 2019-04-15 PROCEDURE — 80069 RENAL FUNCTION PANEL: CPT | Performed by: INTERNAL MEDICINE

## 2019-04-15 PROCEDURE — 84156 ASSAY OF PROTEIN URINE: CPT | Performed by: INTERNAL MEDICINE

## 2019-04-15 PROCEDURE — 36415 COLL VENOUS BLD VENIPUNCTURE: CPT | Performed by: INTERNAL MEDICINE

## 2019-04-15 PROCEDURE — 82306 VITAMIN D 25 HYDROXY: CPT | Performed by: INTERNAL MEDICINE

## 2019-04-15 PROCEDURE — 82570 ASSAY OF URINE CREATININE: CPT | Performed by: INTERNAL MEDICINE

## 2019-04-19 ENCOUNTER — OFFICE VISIT (OUTPATIENT)
Dept: OTOLARYNGOLOGY | Facility: CLINIC | Age: 79
End: 2019-04-19

## 2019-04-19 VITALS — WEIGHT: 265 LBS | BODY MASS INDEX: 52.03 KG/M2 | HEIGHT: 60 IN | OXYGEN SATURATION: 96 %

## 2019-04-19 DIAGNOSIS — G47.33 OBSTRUCTIVE SLEEP APNEA SYNDROME: Primary | ICD-10-CM

## 2019-04-19 PROCEDURE — 99212 OFFICE O/P EST SF 10 MIN: CPT | Performed by: OTOLARYNGOLOGY

## 2019-04-19 NOTE — PROGRESS NOTES
Subjective   Eleno Savage is a 79 y.o. male.       History of Present Illness   Patient has a long history of obstructive sleep apnea syndrome successfully treated with CPAP.  Has excellent compliance which is been documented on numerous occasions with device usage dated.  Came in ahead of schedule today because he is having to change providers for his CPAP supplies.  He was told a face-to-face encounter would be needed for a new order.  Patient reports he is using his CPAP every night like he always has and is having no significant problems at this point.      The following portions of the patient's history were reviewed and updated as appropriate: allergies, current medications, past family history, past medical history, past social history, past surgical history and problem list.     reports that he has quit smoking. He has never used smokeless tobacco. He reports that he does not drink alcohol or use drugs.   Patient is not a tobacco user and has not been counseled for use of tobacco products      Review of Systems        Objective   Physical Exam  Nares: No discharge or purulence  Oral cavity: No masses or lesions  Neck no adenopathy      Assessment/Plan   Eleno was seen today for follow-up.    Diagnoses and all orders for this visit:    Obstructive sleep apnea syndrome        Plan: Prescription written for him to obtain CPAP supplies from [facility].  See me again in a year, call sooner for problems.

## 2019-06-03 RX ORDER — CARVEDILOL 3.12 MG/1
TABLET ORAL
Qty: 60 TABLET | Refills: 11 | Status: SHIPPED | OUTPATIENT
Start: 2019-06-03 | End: 2020-07-24

## 2019-07-08 RX ORDER — GABAPENTIN 600 MG/1
TABLET ORAL
Qty: 30 TABLET | Refills: 5 | OUTPATIENT
Start: 2019-07-08

## 2019-07-24 ENCOUNTER — LAB (OUTPATIENT)
Dept: LAB | Facility: OTHER | Age: 79
End: 2019-07-24

## 2019-07-24 DIAGNOSIS — E11.22 TYPE 2 DIABETES MELLITUS WITH STAGE 2 CHRONIC KIDNEY DISEASE, WITHOUT LONG-TERM CURRENT USE OF INSULIN (HCC): Chronic | ICD-10-CM

## 2019-07-24 DIAGNOSIS — E78.2 MIXED HYPERLIPIDEMIA: Chronic | ICD-10-CM

## 2019-07-24 DIAGNOSIS — E79.0 HYPERURICEMIA: Chronic | ICD-10-CM

## 2019-07-24 DIAGNOSIS — I10 ESSENTIAL HYPERTENSION: Chronic | ICD-10-CM

## 2019-07-24 DIAGNOSIS — M10.30 GOUT DUE TO RENAL IMPAIRMENT, UNSPECIFIED CHRONICITY, UNSPECIFIED SITE: Chronic | ICD-10-CM

## 2019-07-24 DIAGNOSIS — N18.2 TYPE 2 DIABETES MELLITUS WITH STAGE 2 CHRONIC KIDNEY DISEASE, WITHOUT LONG-TERM CURRENT USE OF INSULIN (HCC): Chronic | ICD-10-CM

## 2019-07-24 DIAGNOSIS — E55.9 VITAMIN D DEFICIENCY: Chronic | ICD-10-CM

## 2019-07-24 DIAGNOSIS — Z12.5 SPECIAL SCREENING FOR MALIGNANT NEOPLASM OF PROSTATE: ICD-10-CM

## 2019-07-24 LAB
ALBUMIN SERPL-MCNC: 3.9 G/DL (ref 3.5–5)
ALBUMIN UR-MCNC: <1.2 MG/L
ALBUMIN/GLOB SERPL: 1.4 G/DL (ref 1.1–1.8)
ALP SERPL-CCNC: 121 U/L (ref 38–126)
ALT SERPL W P-5'-P-CCNC: 19 U/L
ANION GAP SERPL CALCULATED.3IONS-SCNC: 9 MMOL/L (ref 5–15)
AST SERPL-CCNC: 18 U/L (ref 17–59)
BASOPHILS # BLD AUTO: 0.01 10*3/MM3 (ref 0–0.2)
BASOPHILS NFR BLD AUTO: 0.1 % (ref 0–1.5)
BILIRUB SERPL-MCNC: 0.1 MG/DL (ref 0.2–1.3)
BUN BLD-MCNC: 19 MG/DL (ref 7–23)
BUN/CREAT SERPL: 14.4 (ref 7–25)
CALCIUM SPEC-SCNC: 9.1 MG/DL (ref 8.4–10.2)
CHLORIDE SERPL-SCNC: 109 MMOL/L (ref 101–112)
CO2 SERPL-SCNC: 28 MMOL/L (ref 22–30)
CREAT BLD-MCNC: 1.32 MG/DL (ref 0.7–1.3)
CREAT UR-MCNC: 80.4 MG/DL
DEPRECATED RDW RBC AUTO: 49.6 FL (ref 37–54)
EOSINOPHIL # BLD AUTO: 0.34 10*3/MM3 (ref 0–0.4)
EOSINOPHIL NFR BLD AUTO: 5 % (ref 0.3–6.2)
ERYTHROCYTE [DISTWIDTH] IN BLOOD BY AUTOMATED COUNT: 15.1 % (ref 12.3–15.4)
GFR SERPL CREATININE-BSD FRML MDRD: 52 ML/MIN/1.73 (ref 42–98)
GLOBULIN UR ELPH-MCNC: 2.7 GM/DL (ref 2.3–3.5)
GLUCOSE BLD-MCNC: 112 MG/DL (ref 70–99)
HBA1C MFR BLD: 6.17 % (ref 4.8–5.6)
HCT VFR BLD AUTO: 42.5 % (ref 37.5–51)
HGB BLD-MCNC: 14.4 G/DL (ref 13–17.7)
LYMPHOCYTES # BLD AUTO: 1.78 10*3/MM3 (ref 0.7–3.1)
LYMPHOCYTES NFR BLD AUTO: 26 % (ref 19.6–45.3)
MCH RBC QN AUTO: 31.7 PG (ref 26.6–33)
MCHC RBC AUTO-ENTMCNC: 33.9 G/DL (ref 31.5–35.7)
MCV RBC AUTO: 93.6 FL (ref 79–97)
MICROALBUMIN/CREAT UR: NORMAL MG/G
MONOCYTES # BLD AUTO: 0.63 10*3/MM3 (ref 0.1–0.9)
MONOCYTES NFR BLD AUTO: 9.2 % (ref 5–12)
NEUTROPHILS # BLD AUTO: 4.09 10*3/MM3 (ref 1.7–7)
NEUTROPHILS NFR BLD AUTO: 59.7 % (ref 42.7–76)
PLATELET # BLD AUTO: 174 10*3/MM3 (ref 140–450)
PMV BLD AUTO: 11.3 FL (ref 6–12)
POTASSIUM BLD-SCNC: 4 MMOL/L (ref 3.4–5)
PROT SERPL-MCNC: 6.6 G/DL (ref 6.3–8.6)
RBC # BLD AUTO: 4.54 10*6/MM3 (ref 4.14–5.8)
SODIUM BLD-SCNC: 146 MMOL/L (ref 137–145)
URATE SERPL-MCNC: 6.9 MG/DL (ref 3.5–8.5)
WBC NRBC COR # BLD: 6.85 10*3/MM3 (ref 3.4–10.8)

## 2019-07-24 PROCEDURE — G0103 PSA SCREENING: HCPCS | Performed by: INTERNAL MEDICINE

## 2019-07-24 PROCEDURE — 84550 ASSAY OF BLOOD/URIC ACID: CPT | Performed by: INTERNAL MEDICINE

## 2019-07-24 PROCEDURE — 85025 COMPLETE CBC W/AUTO DIFF WBC: CPT | Performed by: INTERNAL MEDICINE

## 2019-07-24 PROCEDURE — 82043 UR ALBUMIN QUANTITATIVE: CPT | Performed by: INTERNAL MEDICINE

## 2019-07-24 PROCEDURE — 80053 COMPREHEN METABOLIC PANEL: CPT | Performed by: INTERNAL MEDICINE

## 2019-07-24 PROCEDURE — 83721 ASSAY OF BLOOD LIPOPROTEIN: CPT | Performed by: INTERNAL MEDICINE

## 2019-07-24 PROCEDURE — 82570 ASSAY OF URINE CREATININE: CPT | Performed by: INTERNAL MEDICINE

## 2019-07-24 PROCEDURE — 82306 VITAMIN D 25 HYDROXY: CPT | Performed by: INTERNAL MEDICINE

## 2019-07-24 PROCEDURE — 36415 COLL VENOUS BLD VENIPUNCTURE: CPT | Performed by: INTERNAL MEDICINE

## 2019-07-24 PROCEDURE — 83036 HEMOGLOBIN GLYCOSYLATED A1C: CPT | Performed by: INTERNAL MEDICINE

## 2019-07-25 LAB
25(OH)D3 SERPL-MCNC: 56.7 NG/ML (ref 30–100)
ARTICHOKE IGE QN: 57 MG/DL (ref 0–100)
PSA SERPL-MCNC: 0.13 NG/ML (ref 0–4)

## 2019-07-30 ENCOUNTER — OFFICE VISIT (OUTPATIENT)
Dept: FAMILY MEDICINE CLINIC | Facility: CLINIC | Age: 79
End: 2019-07-30

## 2019-07-30 VITALS
WEIGHT: 267 LBS | HEART RATE: 64 BPM | SYSTOLIC BLOOD PRESSURE: 120 MMHG | TEMPERATURE: 98.1 F | BODY MASS INDEX: 41.91 KG/M2 | DIASTOLIC BLOOD PRESSURE: 68 MMHG | HEIGHT: 67 IN

## 2019-07-30 DIAGNOSIS — E78.1 HYPERTRIGLYCERIDEMIA: Chronic | ICD-10-CM

## 2019-07-30 DIAGNOSIS — R35.1 BENIGN PROSTATIC HYPERPLASIA WITH NOCTURIA: Chronic | ICD-10-CM

## 2019-07-30 DIAGNOSIS — N18.30 CHRONIC KIDNEY DISEASE, STAGE 3 (HCC): Chronic | ICD-10-CM

## 2019-07-30 DIAGNOSIS — N40.1 BENIGN PROSTATIC HYPERPLASIA WITH NOCTURIA: Chronic | ICD-10-CM

## 2019-07-30 DIAGNOSIS — I25.10 CORONARY ARTERIOSCLEROSIS: Chronic | ICD-10-CM

## 2019-07-30 DIAGNOSIS — E78.2 MIXED HYPERLIPIDEMIA: Chronic | ICD-10-CM

## 2019-07-30 DIAGNOSIS — E11.22 TYPE 2 DIABETES MELLITUS WITH STAGE 2 CHRONIC KIDNEY DISEASE, WITHOUT LONG-TERM CURRENT USE OF INSULIN (HCC): Primary | Chronic | ICD-10-CM

## 2019-07-30 DIAGNOSIS — M10.30 GOUT DUE TO RENAL IMPAIRMENT, UNSPECIFIED CHRONICITY, UNSPECIFIED SITE: Chronic | ICD-10-CM

## 2019-07-30 DIAGNOSIS — G47.33 OBSTRUCTIVE SLEEP APNEA SYNDROME: Chronic | ICD-10-CM

## 2019-07-30 DIAGNOSIS — J43.2 CENTRILOBULAR EMPHYSEMA (HCC): Chronic | ICD-10-CM

## 2019-07-30 DIAGNOSIS — N18.2 TYPE 2 DIABETES MELLITUS WITH STAGE 2 CHRONIC KIDNEY DISEASE, WITHOUT LONG-TERM CURRENT USE OF INSULIN (HCC): Primary | Chronic | ICD-10-CM

## 2019-07-30 DIAGNOSIS — K21.9 GASTROESOPHAGEAL REFLUX DISEASE, ESOPHAGITIS PRESENCE NOT SPECIFIED: Chronic | ICD-10-CM

## 2019-07-30 DIAGNOSIS — E11.319 DIABETIC RETINOPATHY OF BOTH EYES ASSOCIATED WITH TYPE 2 DIABETES MELLITUS, MACULAR EDEMA PRESENCE UNSPECIFIED, UNSPECIFIED RETINOPATHY SEVERITY (HCC): Chronic | ICD-10-CM

## 2019-07-30 DIAGNOSIS — S51.011A SKIN TEAR OF RIGHT ELBOW WITHOUT COMPLICATION, INITIAL ENCOUNTER: ICD-10-CM

## 2019-07-30 DIAGNOSIS — J30.1 SEASONAL ALLERGIC RHINITIS DUE TO POLLEN: Chronic | ICD-10-CM

## 2019-07-30 DIAGNOSIS — G47.00 INSOMNIA, UNSPECIFIED TYPE: Chronic | ICD-10-CM

## 2019-07-30 DIAGNOSIS — E66.01 CLASS 3 SEVERE OBESITY DUE TO EXCESS CALORIES WITH SERIOUS COMORBIDITY AND BODY MASS INDEX (BMI) OF 40.0 TO 44.9 IN ADULT (HCC): Chronic | ICD-10-CM

## 2019-07-30 DIAGNOSIS — I10 ESSENTIAL HYPERTENSION: Chronic | ICD-10-CM

## 2019-07-30 PROBLEM — G47.9 SLEEP DISORDER: Chronic | Status: ACTIVE | Noted: 2019-07-30

## 2019-07-30 PROCEDURE — 90715 TDAP VACCINE 7 YRS/> IM: CPT | Performed by: INTERNAL MEDICINE

## 2019-07-30 PROCEDURE — 99214 OFFICE O/P EST MOD 30 MIN: CPT | Performed by: INTERNAL MEDICINE

## 2019-07-30 PROCEDURE — 90471 IMMUNIZATION ADMIN: CPT | Performed by: INTERNAL MEDICINE

## 2019-07-30 RX ORDER — GABAPENTIN 600 MG/1
TABLET ORAL
Qty: 30 TABLET | Refills: 5 | Status: SHIPPED | OUTPATIENT
Start: 2019-07-30 | End: 2020-02-04 | Stop reason: SDUPTHER

## 2019-07-30 NOTE — PATIENT INSTRUCTIONS
Calorie Counting for Weight Loss  Calories are units of energy. Your body needs a certain amount of calories from food to keep you going throughout the day. When you eat more calories than your body needs, your body stores the extra calories as fat. When you eat fewer calories than your body needs, your body burns fat to get the energy it needs.  Calorie counting means keeping track of how many calories you eat and drink each day. Calorie counting can be helpful if you need to lose weight. If you make sure to eat fewer calories than your body needs, you should lose weight. Ask your health care provider what a healthy weight is for you.  For calorie counting to work, you will need to eat the right number of calories in a day in order to lose a healthy amount of weight per week. A dietitian can help you determine how many calories you need in a day and will give you suggestions on how to reach your calorie goal.  · A healthy amount of weight to lose per week is usually 1-2 lb (0.5-0.9 kg). This usually means that your daily calorie intake should be reduced by 500-750 calories.  · Eating 1,200 - 1,500 calories per day can help most women lose weight.  · Eating 1,500 - 1,800 calories per day can help most men lose weight.    What is my plan?  My goal is to have __________ calories per day.  If I have this many calories per day, I should lose around __________ pounds per week.  What do I need to know about calorie counting?  In order to meet your daily calorie goal, you will need to:  · Find out how many calories are in each food you would like to eat. Try to do this before you eat.  · Decide how much of the food you plan to eat.  · Write down what you ate and how many calories it had. Doing this is called keeping a food log.    To successfully lose weight, it is important to balance calorie counting with a healthy lifestyle that includes regular activity. Aim for 150 minutes of moderate exercise (such as walking) or 75  minutes of vigorous exercise (such as running) each week.  Where do I find calorie information?    The number of calories in a food can be found on a Nutrition Facts label. If a food does not have a Nutrition Facts label, try to look up the calories online or ask your dietitian for help.  Remember that calories are listed per serving. If you choose to have more than one serving of a food, you will have to multiply the calories per serving by the amount of servings you plan to eat. For example, the label on a package of bread might say that a serving size is 1 slice and that there are 90 calories in a serving. If you eat 1 slice, you will have eaten 90 calories. If you eat 2 slices, you will have eaten 180 calories.  How do I keep a food log?  Immediately after each meal, record the following information in your food log:  · What you ate. Don't forget to include toppings, sauces, and other extras on the food.  · How much you ate. This can be measured in cups, ounces, or number of items.  · How many calories each food and drink had.  · The total number of calories in the meal.    Keep your food log near you, such as in a small notebook in your pocket, or use a mobile kelby or website. Some programs will calculate calories for you and show you how many calories you have left for the day to meet your goal.  What are some calorie counting tips?  · Use your calories on foods and drinks that will fill you up and not leave you hungry:  ? Some examples of foods that fill you up are nuts and nut butters, vegetables, lean proteins, and high-fiber foods like whole grains. High-fiber foods are foods with more than 5 g fiber per serving.  ? Drinks such as sodas, specialty coffee drinks, alcohol, and juices have a lot of calories, yet do not fill you up.  · Eat nutritious foods and avoid empty calories. Empty calories are calories you get from foods or beverages that do not have many vitamins or protein, such as candy, sweets, and  "soda. It is better to have a nutritious high-calorie food (such as an avocado) than a food with few nutrients (such as a bag of chips).  · Know how many calories are in the foods you eat most often. This will help you calculate calorie counts faster.  · Pay attention to calories in drinks. Low-calorie drinks include water and unsweetened drinks.  · Pay attention to nutrition labels for \"low fat\" or \"fat free\" foods. These foods sometimes have the same amount of calories or more calories than the full fat versions. They also often have added sugar, starch, or salt, to make up for flavor that was removed with the fat.  · Find a way of tracking calories that works for you. Get creative. Try different apps or programs if writing down calories does not work for you.  What are some portion control tips?  · Know how many calories are in a serving. This will help you know how many servings of a certain food you can have.  · Use a measuring cup to measure serving sizes. You could also try weighing out portions on a kitchen scale. With time, you will be able to estimate serving sizes for some foods.  · Take some time to put servings of different foods on your favorite plates, bowls, and cups so you know what a serving looks like.  · Try not to eat straight from a bag or box. Doing this can lead to overeating. Put the amount you would like to eat in a cup or on a plate to make sure you are eating the right portion.  · Use smaller plates, glasses, and bowls to prevent overeating.  · Try not to multitask (for example, watch TV or use your computer) while eating. If it is time to eat, sit down at a table and enjoy your food. This will help you to know when you are full. It will also help you to be aware of what you are eating and how much you are eating.  What are tips for following this plan?  Reading food labels  · Check the calorie count compared to the serving size. The serving size may be smaller than what you are used to " eating.  · Check the source of the calories. Make sure the food you are eating is high in vitamins and protein and low in saturated and trans fats.  Shopping  · Read nutrition labels while you shop. This will help you make healthy decisions before you decide to purchase your food.  · Make a grocery list and stick to it.  Cooking  · Try to cook your favorite foods in a healthier way. For example, try baking instead of frying.  · Use low-fat dairy products.  Meal planning  · Use more fruits and vegetables. Half of your plate should be fruits and vegetables.  · Include lean proteins like poultry and fish.  How do I count calories when eating out?  · Ask for smaller portion sizes.  · Consider sharing an entree and sides instead of getting your own entree.  · If you get your own entree, eat only half. Ask for a box at the beginning of your meal and put the rest of your entree in it so you are not tempted to eat it.  · If calories are listed on the menu, choose the lower calorie options.  · Choose dishes that include vegetables, fruits, whole grains, low-fat dairy products, and lean protein.  · Choose items that are boiled, broiled, grilled, or steamed. Stay away from items that are buttered, battered, fried, or served with cream sauce. Items labeled “crispy” are usually fried, unless stated otherwise.  · Choose water, low-fat milk, unsweetened iced tea, or other drinks without added sugar. If you want an alcoholic beverage, choose a lower calorie option such as a glass of wine or light beer.  · Ask for dressings, sauces, and syrups on the side. These are usually high in calories, so you should limit the amount you eat.  · If you want a salad, choose a garden salad and ask for grilled meats. Avoid extra toppings like baum, cheese, or fried items. Ask for the dressing on the side, or ask for olive oil and vinegar or lemon to use as dressing.  · Estimate how many servings of a food you are given. For example, a serving of  cooked rice is ½ cup or about the size of half a baseball. Knowing serving sizes will help you be aware of how much food you are eating at restaurants. The list below tells you how big or small some common portion sizes are based on everyday objects:  ? 1 oz--4 stacked dice.  ? 3 oz--1 deck of cards.  ? 1 tsp--1 die.  ? 1 Tbsp--½ a ping-pong ball.  ? 2 Tbsp--1 ping-pong ball.  ? ½ cup--½ baseball.  ? 1 cup--1 baseball.  Summary  · Calorie counting means keeping track of how many calories you eat and drink each day. If you eat fewer calories than your body needs, you should lose weight.  · A healthy amount of weight to lose per week is usually 1-2 lb (0.5-0.9 kg). This usually means reducing your daily calorie intake by 500-750 calories.  · The number of calories in a food can be found on a Nutrition Facts label. If a food does not have a Nutrition Facts label, try to look up the calories online or ask your dietitian for help.  · Use your calories on foods and drinks that will fill you up, and not on foods and drinks that will leave you hungry.  · Use smaller plates, glasses, and bowls to prevent overeating.  This information is not intended to replace advice given to you by your health care provider. Make sure you discuss any questions you have with your health care provider.  Document Released: 12/18/2006 Document Revised: 11/17/2017 Document Reviewed: 11/17/2017  [employer] Interactive Patient Education © 2019 [employer]      Diabetes Mellitus and Exercise  Exercising regularly is important for your overall health, especially when you have diabetes (diabetes mellitus). Exercising is not only about losing weight. It has many other health benefits, such as increasing muscle strength and bone density and reducing body fat and stress. This leads to improved fitness, flexibility, and endurance, all of which result in better overall health.  Exercise has additional benefits for people with diabetes,  including:  · Reducing appetite.  · Helping to lower and control blood glucose.  · Lowering blood pressure.  · Helping to control amounts of fatty substances (lipids) in the blood, such as cholesterol and triglycerides.  · Helping the body to respond better to insulin (improving insulin sensitivity).  · Reducing how much insulin the body needs.  · Decreasing the risk for heart disease by:  ? Lowering cholesterol and triglyceride levels.  ? Increasing the levels of good cholesterol.  ? Lowering blood glucose levels.    What is my activity plan?  Your health care provider or certified diabetes educator can help you make a plan for the type and frequency of exercise (activity plan) that works for you. Make sure that you:  · Do at least 150 minutes of moderate-intensity or vigorous-intensity exercise each week. This could be brisk walking, biking, or water aerobics.  ? Do stretching and strength exercises, such as yoga or weightlifting, at least 2 times a week.  ? Spread out your activity over at least 3 days of the week.  · Get some form of physical activity every day.  ? Do not go more than 2 days in a row without some kind of physical activity.  ? Avoid being inactive for more than 30 minutes at a time. Take frequent breaks to walk or stretch.  · Choose a type of exercise or activity that you enjoy, and set realistic goals.  · Start slowly, and gradually increase the intensity of your exercise over time.    What do I need to know about managing my diabetes?  · Check your blood glucose before and after exercising.  ? If your blood glucose is 240 mg/dL (13.3 mmol/L) or higher before you exercise, check your urine for ketones. If you have ketones in your urine, do not exercise until your blood glucose returns to normal.  ? If your blood glucose is 100 mg/dL (5.6 mmol/L) or lower, eat a snack containing 15-20 grams of carbohydrate. Check your blood glucose 15 minutes after the snack to make sure that your level is above  100 mg/dL (5.6 mmol/L) before you start your exercise.  · Know the symptoms of low blood glucose (hypoglycemia) and how to treat it. Your risk for hypoglycemia increases during and after exercise. Common symptoms of hypoglycemia can include:  ? Hunger.  ? Anxiety.  ? Sweating and feeling clammy.  ? Confusion.  ? Dizziness or feeling light-headed.  ? Increased heart rate or palpitations.  ? Blurry vision.  ? Tingling or numbness around the mouth, lips, or tongue.  ? Tremors or shakes.  ? Irritability.  · Keep a rapid-acting carbohydrate snack available before, during, and after exercise to help prevent or treat hypoglycemia.  · Avoid injecting insulin into areas of the body that are going to be exercised. For example, avoid injecting insulin into:  ? The arms, when playing tennis.  ? The legs, when jogging.  · Keep records of your exercise habits. Doing this can help you and your health care provider adjust your diabetes management plan as needed. Write down:  ? Food that you eat before and after you exercise.  ? Blood glucose levels before and after you exercise.  ? The type and amount of exercise you have done.  ? When your insulin is expected to peak, if you use insulin. Avoid exercising at times when your insulin is peaking.  · When you start a new exercise or activity, work with your health care provider to make sure the activity is safe for you, and to adjust your insulin, medicines, or food intake as needed.  · Drink plenty of water while you exercise to prevent dehydration or heat stroke. Drink enough fluid to keep your urine clear or pale yellow.  Summary  · Exercising regularly is important for your overall health, especially when you have diabetes (diabetes mellitus).  · Exercising has many health benefits, such as increasing muscle strength and bone density and reducing body fat and stress.  · Your health care provider or certified diabetes educator can help you make a plan for the type and frequency of  exercise (activity plan) that works for you.  · When you start a new exercise or activity, work with your health care provider to make sure the activity is safe for you, and to adjust your insulin, medicines, or food intake as needed.  This information is not intended to replace advice given to you by your health care provider. Make sure you discuss any questions you have with your health care provider.  Document Released: 03/09/2005 Document Revised: 06/28/2018 Document Reviewed: 05/29/2017  [employer] Interactive Patient Education © 2019 [employer]

## 2019-07-30 NOTE — PROGRESS NOTES
Subjective     Eleno Savage is a 79 y.o. male.     History of Present Illness     Eleno Savage is a 79 y.o. male who presents for 6-month follow up on type 2 diabetes mellitus, stage 2 chronic kidney disease, obesity, COPD, GERD, vitamin D deficiency, and high cholesterol/high triglycerides.  He follows with Dr. Weathers, nephrologist annually for CKD.  Dr. Mesa is his cardiologist.  He denies chest pain.  He has obstructive sleep apnea and continues to report compliance with CPAP.  GERD symptoms adequately controlled with Prilosec.  He continues on prophylactic allopurinol.    He denies recent gout flares.       He continues on Neurontin for diabetic neuropathy and denies significant side effects including excessive daytime sedation.  Patient understands the risks associated with this controlled medication, including tolerance and addiction.      His blood pressure is well controlled today.  His weight is up 2 pounds in the past 6 months and up 6 pounds in the past 7 months.    He acquired a skin tear of the right elbow 3 days ago.  He was changing the brakes on his 1973 Volkswagen super beetle.  They have been cleaning it and applying triple antibiotic ointment.    His labs are all reviewed with results listed below.  Renal function is slightly improved with creatinine down to 1.32.  CBC is unremarkable.  A1c is 6.17.  Uric acid is 6.9.  He denies any gout episodes in the past 6 months.    Review of Systems   Constitutional: Negative for chills, fatigue and fever.   HENT: Positive for postnasal drip. Negative for congestion, ear pain, sinus pressure and sore throat.    Respiratory: Positive for cough. Negative for shortness of breath and wheezing.    Cardiovascular: Positive for leg swelling. Negative for chest pain and palpitations.   Gastrointestinal: Negative for abdominal pain, blood in stool, constipation, diarrhea, nausea and vomiting.   Endocrine: Negative for cold intolerance, heat intolerance,  "polydipsia and polyuria.   Genitourinary: Negative for dysuria, frequency, hematuria and urgency.   Skin: Negative for rash.   Neurological: Negative for syncope and weakness.       Objective     /68   Pulse 64   Temp 98.1 °F (36.7 °C) (Oral)   Ht 170.2 cm (67\")   Wt 121 kg (267 lb)   BMI 41.82 kg/m²     Physical Exam   Constitutional: He is oriented to person, place, and time. He appears well-developed and well-nourished. No distress.   Very pleasant, morbidly obese male accompanied by his wife.    HENT:   Head: Normocephalic and atraumatic.   Nose: Right sinus exhibits no maxillary sinus tenderness and no frontal sinus tenderness. Left sinus exhibits no maxillary sinus tenderness and no frontal sinus tenderness.   Mouth/Throat: Uvula is midline, oropharynx is clear and moist and mucous membranes are normal. No oral lesions. No tonsillar exudate.   Nasal congestion and clear postnasal drip.    Eyes: Conjunctivae and EOM are normal. Pupils are equal, round, and reactive to light.   Neck: Trachea normal. Neck supple. No JVD present. Carotid bruit is not present. No tracheal deviation present. No thyroid mass and no thyromegaly present.   Cardiovascular: Normal rate, regular rhythm, normal heart sounds and intact distal pulses.  No extrasystoles are present. PMI is not displaced.   No murmur heard.  Pulmonary/Chest: Effort normal and breath sounds normal. No accessory muscle usage. No respiratory distress. He has no decreased breath sounds. He has no wheezes. He has no rhonchi. He has no rales.   Abdominal: Soft. Bowel sounds are normal. He exhibits no distension. There is no hepatosplenomegaly. There is no tenderness.   Obese abdomen limits exam.      Vascular Status -  His right foot exhibits abnormal foot vasculature  (Diminihsed pulses bilateral lower extremities. ) and abnormal foot edema (Trace edema bilateral ankles.  No compression stockings). His left foot exhibits abnormal foot vasculature  and " abnormal foot edema.  Lymphadenopathy:     He has no cervical adenopathy.   Neurological: He is alert and oriented to person, place, and time. No cranial nerve deficit. Coordination normal.   Skin: Skin is warm, dry and intact. No rash noted. No cyanosis. Nails show no clubbing.   2 cm skin tear right elbow.  No drainage or surrounding warmth or erythema.   Psychiatric: He has a normal mood and affect. His speech is normal and behavior is normal. Judgment and thought content normal.   Vitals reviewed.      PHQ-2/PHQ-9 Depression Screening 1/28/2019   Little interest or pleasure in doing things 0   Feeling down, depressed, or hopeless 0   Total Score 0       Assessment/Plan     The skin tear to the right elbow is a new problem.  Continue to clean it with gentle soap and water twice daily and apply topical antibiotic ointment twice daily.  We reviewed wound care to follow until it fully heals.    I am pleased that he has weaned off of sleep aids.  His insurance no longer wanted to cover Ambien.  We tried Belsomra for a while, but he reports that it made him feel too drowsy the next day.  He feels that his sleep is actually quite adequate currently with just his evening dose of Neurontin and wearing the CPAP.    Continue Neurontin 600 mg to use one q.h.s. Written literature regarding diet and exercise included in patient's AVS today.  A prescription is given for Neurontin.  Patient understands the risks associated with this controlled medication, including tolerance and addiction.  He also agrees to only obtain this medication from me, and not from a another provider, unless that provider is covering for me in my absence.  He also agrees to be compliant in dosing, and not self adjust the dose of medication.  A signed controlled substance agreement is on file, and he has received a controlled substance education sheet at this a previous visit.  He has also signed a consent for treatment with a controlled substance as  per [facility] policy. NEFTALY was obtained.    I voiced my concern about his continued weight gain.  I urged him to greatly intensify efforts at diabetic diet, exercise, and weight loss.    Continue Crestor.    Continue with current use of nebulizer treatments and inhalers to manage his COPD, which he feels is stable.    Continue to avoid NSAIDs and other nephrotoxic drugs.  He will continue to see his nephrologist, Dr. Weathers, annually.    Continue other current medications and vitamin/mineral supplements to treat the other medical issues we addressed today.    Return to clinic in 6 months with fasting labs prior.    Diagnoses and all orders for this visit:    Type 2 diabetes mellitus with stage 2 chronic kidney disease, without long-term current use of insulin (CMS/[facility])  -     CBC Auto Differential; Future  -     Comprehensive Metabolic Panel; Future  -     Hemoglobin A1c; Future  -     Lipid Panel; Future    Coronary arteriosclerosis - followed by Dr. Farooq  -     Lipid Panel; Future    Essential hypertension  -     Comprehensive Metabolic Panel; Future    Mixed hyperlipidemia  -     Lipid Panel; Future    Hypertriglyceridemia  -     Lipid Panel; Future    Seasonal allergic rhinitis due to pollen    Centrilobular emphysema (CMS/[facility])    Obstructive sleep apnea syndrome - on CPAP    Gastroesophageal reflux disease, esophagitis presence not specified    Class 3 severe obesity due to excess calories with serious comorbidity and body mass index (BMI) of 40.0 to 44.9 in adult (CMS/[facility])    Diabetic retinopathy of both eyes associated with type 2 diabetes mellitus, macular edema presence unspecified, unspecified retinopathy severity (CMS/[facility])    Chronic kidney disease, stage 3 (CMS/[facility])  -     Comprehensive Metabolic Panel; Future  -     Vitamin D 25 Hydroxy; Future    Benign prostatic hyperplasia with nocturia    Gout due to renal impairment, unspecified chronicity, unspecified site  -     Uric Acid;  Future    Insomnia, unspecified type    Skin tear of right elbow without complication, initial encounter  -     Tdap Vaccine Greater Than or Equal To 8yo IM    Other orders  -     gabapentin (NEURONTIN) 600 MG tablet; 1 every night        Lab on 07/24/2019   Component Date Value Ref Range Status   • WBC 07/24/2019 6.85  3.40 - 10.80 10*3/mm3 Final   • RBC 07/24/2019 4.54  4.14 - 5.80 10*6/mm3 Final   • Hemoglobin 07/24/2019 14.4  13.0 - 17.7 g/dL Final   • Hematocrit 07/24/2019 42.5  37.5 - 51.0 % Final   • MCV 07/24/2019 93.6  79.0 - 97.0 fL Final   • MCH 07/24/2019 31.7  26.6 - 33.0 pg Final   • MCHC 07/24/2019 33.9  31.5 - 35.7 g/dL Final   • RDW 07/24/2019 15.1  12.3 - 15.4 % Final   • RDW-SD 07/24/2019 49.6  37.0 - 54.0 fl Final   • MPV 07/24/2019 11.3  6.0 - 12.0 fL Final   • Platelets 07/24/2019 174  140 - 450 10*3/mm3 Final   • Neutrophil % 07/24/2019 59.7  42.7 - 76.0 % Final   • Lymphocyte % 07/24/2019 26.0  19.6 - 45.3 % Final   • Monocyte % 07/24/2019 9.2  5.0 - 12.0 % Final   • Eosinophil % 07/24/2019 5.0  0.3 - 6.2 % Final   • Basophil % 07/24/2019 0.1  0.0 - 1.5 % Final   • Neutrophils, Absolute 07/24/2019 4.09  1.70 - 7.00 10*3/mm3 Final   • Lymphocytes, Absolute 07/24/2019 1.78  0.70 - 3.10 10*3/mm3 Final   • Monocytes, Absolute 07/24/2019 0.63  0.10 - 0.90 10*3/mm3 Final   • Eosinophils, Absolute 07/24/2019 0.34  0.00 - 0.40 10*3/mm3 Final   • Basophils, Absolute 07/24/2019 0.01  0.00 - 0.20 10*3/mm3 Final   • Glucose 07/24/2019 112* 70 - 99 mg/dL Final   • BUN 07/24/2019 19  7 - 23 mg/dL Final   • Creatinine 07/24/2019 1.32* 0.70 - 1.30 mg/dL Final   • Sodium 07/24/2019 146* 137 - 145 mmol/L Final   • Potassium 07/24/2019 4.0  3.4 - 5.0 mmol/L Final   • Chloride 07/24/2019 109  101 - 112 mmol/L Final   • CO2 07/24/2019 28.0  22.0 - 30.0 mmol/L Final   • Calcium 07/24/2019 9.1  8.4 - 10.2 mg/dL Final   • Total Protein 07/24/2019 6.6  6.3 - 8.6 g/dL Final   • Albumin 07/24/2019 3.90  3.50 - 5.00  g/dL Final   • ALT (SGPT) 07/24/2019 19  <=50 U/L Final   • AST (SGOT) 07/24/2019 18  17 - 59 U/L Final   • Alkaline Phosphatase 07/24/2019 121  38 - 126 U/L Final   • Total Bilirubin 07/24/2019 0.1* 0.2 - 1.3 mg/dL Final   • eGFR Non  Amer 07/24/2019 52  42 - 98 mL/min/1.73 Final   • Globulin 07/24/2019 2.7  2.3 - 3.5 gm/dL Final   • A/G Ratio 07/24/2019 1.4  1.1 - 1.8 g/dL Final   • BUN/Creatinine Ratio 07/24/2019 14.4  7.0 - 25.0 Final   • Anion Gap 07/24/2019 9.0  5.0 - 15.0 mmol/L Final   • Hemoglobin A1C 07/24/2019 6.17* 4.80 - 5.60 % Final   • LDL Cholesterol  07/24/2019 57  0 - 100 mg/dL Final   • Microalbumin/Creatinine Ratio 07/24/2019   mg/g Final    Unable to calculate   • Creatinine, Urine 07/24/2019 80.4  mg/dL Final   • Microalbumin, Urine 07/24/2019 <1.2  mg/L Final   • PSA 07/24/2019 0.134  0.000 - 4.000 ng/mL Final   • 25 Hydroxy, Vitamin D 07/24/2019 56.7  30.0 - 100.0 ng/ml Final   • Uric Acid 07/24/2019 6.9  3.5 - 8.5 mg/dL Final   ]

## 2019-08-20 RX ORDER — BUSPIRONE HYDROCHLORIDE 10 MG/1
TABLET ORAL
Qty: 180 TABLET | Refills: 3 | Status: SHIPPED | OUTPATIENT
Start: 2019-08-20 | End: 2020-08-19

## 2019-08-28 RX ORDER — ALBUTEROL SULFATE 90 UG/1
2 AEROSOL, METERED RESPIRATORY (INHALATION) EVERY 4 HOURS PRN
Qty: 1 INHALER | Refills: 5 | Status: SHIPPED | OUTPATIENT
Start: 2019-08-28 | End: 2020-11-11

## 2019-09-04 RX ORDER — ROSUVASTATIN CALCIUM 20 MG/1
TABLET, COATED ORAL
Qty: 45 TABLET | Refills: 5 | Status: SHIPPED | OUTPATIENT
Start: 2019-09-04 | End: 2020-10-05

## 2019-09-09 RX ORDER — TRAZODONE HYDROCHLORIDE 150 MG/1
TABLET ORAL
Qty: 90 TABLET | Refills: 3 | Status: SHIPPED | OUTPATIENT
Start: 2019-09-09 | End: 2020-08-10 | Stop reason: ALTCHOICE

## 2019-09-17 RX ORDER — ALLOPURINOL 100 MG/1
TABLET ORAL
Qty: 30 TABLET | Refills: 11 | Status: SHIPPED | OUTPATIENT
Start: 2019-09-17 | End: 2020-09-14

## 2019-10-07 RX ORDER — OMEPRAZOLE 40 MG/1
CAPSULE, DELAYED RELEASE ORAL
Qty: 60 CAPSULE | Refills: 11 | Status: SHIPPED | OUTPATIENT
Start: 2019-10-07 | End: 2020-10-12

## 2019-10-07 RX ORDER — CETIRIZINE HYDROCHLORIDE 10 MG/1
10 TABLET ORAL DAILY PRN
Qty: 30 TABLET | Refills: 11 | Status: SHIPPED | OUTPATIENT
Start: 2019-10-07 | End: 2020-10-12

## 2019-10-08 ENCOUNTER — CLINICAL SUPPORT (OUTPATIENT)
Dept: FAMILY MEDICINE CLINIC | Facility: CLINIC | Age: 79
End: 2019-10-08

## 2019-10-08 DIAGNOSIS — Z23 FLU VACCINE NEED: Primary | ICD-10-CM

## 2019-10-08 PROCEDURE — G0008 ADMIN INFLUENZA VIRUS VAC: HCPCS | Performed by: INTERNAL MEDICINE

## 2019-10-08 PROCEDURE — 90653 IIV ADJUVANT VACCINE IM: CPT | Performed by: INTERNAL MEDICINE

## 2019-10-17 ENCOUNTER — TELEPHONE (OUTPATIENT)
Dept: CASE MANAGEMENT | Facility: OTHER | Age: 79
End: 2019-10-17

## 2019-10-17 NOTE — TELEPHONE ENCOUNTER
Called patient to schedule Medicare Annual Wellness Visit before end of the year.  Patient did not answer, voicemail left requesting return call to [facility] at [phone].

## 2019-10-22 RX ORDER — TAMSULOSIN HYDROCHLORIDE 0.4 MG/1
1 CAPSULE ORAL NIGHTLY
Qty: 90 CAPSULE | Refills: 3 | Status: SHIPPED | OUTPATIENT
Start: 2019-10-22 | End: 2020-07-14

## 2019-11-04 RX ORDER — TRIAMTERENE AND HYDROCHLOROTHIAZIDE 37.5; 25 MG/1; MG/1
1 TABLET ORAL DAILY
Qty: 90 TABLET | Refills: 3 | Status: SHIPPED | OUTPATIENT
Start: 2019-11-04 | End: 2020-11-11

## 2019-11-04 RX ORDER — AMLODIPINE BESYLATE AND BENAZEPRIL HYDROCHLORIDE 5; 20 MG/1; MG/1
CAPSULE ORAL
Qty: 180 CAPSULE | Refills: 3 | Status: SHIPPED | OUTPATIENT
Start: 2019-11-04 | End: 2020-10-05

## 2019-11-19 RX ORDER — ISOSORBIDE MONONITRATE 30 MG/1
30 TABLET, EXTENDED RELEASE ORAL DAILY
Qty: 90 TABLET | Refills: 3 | Status: SHIPPED | OUTPATIENT
Start: 2019-11-19 | End: 2020-11-19

## 2019-11-26 RX ORDER — CLOPIDOGREL BISULFATE 75 MG/1
75 TABLET ORAL DAILY
Qty: 90 TABLET | Refills: 3 | Status: SHIPPED | OUTPATIENT
Start: 2019-11-26 | End: 2020-08-31

## 2019-12-02 RX ORDER — TOLTERODINE 4 MG/1
4 CAPSULE, EXTENDED RELEASE ORAL NIGHTLY
Qty: 90 CAPSULE | Refills: 3 | Status: SHIPPED | OUTPATIENT
Start: 2019-12-02 | End: 2020-08-31

## 2020-01-03 RX ORDER — GABAPENTIN 600 MG/1
TABLET ORAL
Qty: 30 TABLET | Refills: 5 | OUTPATIENT
Start: 2020-01-03

## 2020-01-07 ENCOUNTER — TRANSCRIBE ORDERS (OUTPATIENT)
Dept: GENERAL RADIOLOGY | Facility: CLINIC | Age: 80
End: 2020-01-07

## 2020-01-07 DIAGNOSIS — N17.9 ACUTE RENAL FAILURE, UNSPECIFIED ACUTE RENAL FAILURE TYPE (HCC): Primary | ICD-10-CM

## 2020-01-28 ENCOUNTER — LAB (OUTPATIENT)
Dept: LAB | Facility: OTHER | Age: 80
End: 2020-01-28

## 2020-01-28 DIAGNOSIS — M10.30 GOUT DUE TO RENAL IMPAIRMENT, UNSPECIFIED CHRONICITY, UNSPECIFIED SITE: Chronic | ICD-10-CM

## 2020-01-28 DIAGNOSIS — E78.1 HYPERTRIGLYCERIDEMIA: Chronic | ICD-10-CM

## 2020-01-28 DIAGNOSIS — N18.2 TYPE 2 DIABETES MELLITUS WITH STAGE 2 CHRONIC KIDNEY DISEASE, WITHOUT LONG-TERM CURRENT USE OF INSULIN (HCC): Chronic | ICD-10-CM

## 2020-01-28 DIAGNOSIS — E11.22 TYPE 2 DIABETES MELLITUS WITH STAGE 2 CHRONIC KIDNEY DISEASE, WITHOUT LONG-TERM CURRENT USE OF INSULIN (HCC): Chronic | ICD-10-CM

## 2020-01-28 DIAGNOSIS — E78.2 MIXED HYPERLIPIDEMIA: Chronic | ICD-10-CM

## 2020-01-28 DIAGNOSIS — I25.10 CORONARY ARTERIOSCLEROSIS: Chronic | ICD-10-CM

## 2020-01-28 DIAGNOSIS — I10 ESSENTIAL HYPERTENSION: Chronic | ICD-10-CM

## 2020-01-28 DIAGNOSIS — N18.30 CHRONIC KIDNEY DISEASE, STAGE 3 (HCC): Chronic | ICD-10-CM

## 2020-01-28 LAB
25(OH)D3 SERPL-MCNC: 47.8 NG/ML (ref 30–100)
ALBUMIN SERPL-MCNC: 4 G/DL (ref 3.5–5)
ALBUMIN/GLOB SERPL: 1.3 G/DL (ref 1.1–1.8)
ALP SERPL-CCNC: 106 U/L (ref 38–126)
ALT SERPL W P-5'-P-CCNC: 23 U/L
ANION GAP SERPL CALCULATED.3IONS-SCNC: 8 MMOL/L (ref 5–15)
AST SERPL-CCNC: 30 U/L (ref 17–59)
BASOPHILS # BLD AUTO: 0.04 10*3/MM3 (ref 0–0.2)
BASOPHILS NFR BLD AUTO: 0.5 % (ref 0–1.5)
BILIRUB SERPL-MCNC: 0.3 MG/DL (ref 0.2–1.3)
BUN BLD-MCNC: 23 MG/DL (ref 7–23)
BUN/CREAT SERPL: 15.5 (ref 7–25)
CALCIUM SPEC-SCNC: 9.5 MG/DL (ref 8.4–10.2)
CHLORIDE SERPL-SCNC: 111 MMOL/L (ref 101–112)
CHOLEST SERPL-MCNC: 112 MG/DL (ref 150–200)
CO2 SERPL-SCNC: 23 MMOL/L (ref 22–30)
CREAT BLD-MCNC: 1.48 MG/DL (ref 0.7–1.3)
DEPRECATED RDW RBC AUTO: 48.4 FL (ref 37–54)
EOSINOPHIL # BLD AUTO: 0.43 10*3/MM3 (ref 0–0.4)
EOSINOPHIL NFR BLD AUTO: 5 % (ref 0.3–6.2)
ERYTHROCYTE [DISTWIDTH] IN BLOOD BY AUTOMATED COUNT: 14.9 % (ref 12.3–15.4)
GFR SERPL CREATININE-BSD FRML MDRD: 46 ML/MIN/1.73 (ref 42–98)
GLOBULIN UR ELPH-MCNC: 3.1 GM/DL (ref 2.3–3.5)
GLUCOSE BLD-MCNC: 121 MG/DL (ref 70–99)
HBA1C MFR BLD: 6.2 % (ref 4.8–5.6)
HCT VFR BLD AUTO: 42.5 % (ref 37.5–51)
HDLC SERPL-MCNC: 25 MG/DL (ref 40–59)
HGB BLD-MCNC: 14.1 G/DL (ref 13–17.7)
LDLC SERPL CALC-MCNC: 53 MG/DL
LDLC/HDLC SERPL: 2.11 {RATIO} (ref 0–3.55)
LYMPHOCYTES # BLD AUTO: 2.09 10*3/MM3 (ref 0.7–3.1)
LYMPHOCYTES NFR BLD AUTO: 24.3 % (ref 19.6–45.3)
MCH RBC QN AUTO: 30.3 PG (ref 26.6–33)
MCHC RBC AUTO-ENTMCNC: 33.2 G/DL (ref 31.5–35.7)
MCV RBC AUTO: 91.4 FL (ref 79–97)
MONOCYTES # BLD AUTO: 0.74 10*3/MM3 (ref 0.1–0.9)
MONOCYTES NFR BLD AUTO: 8.6 % (ref 5–12)
NEUTROPHILS # BLD AUTO: 5.29 10*3/MM3 (ref 1.7–7)
NEUTROPHILS NFR BLD AUTO: 61.6 % (ref 42.7–76)
PLATELET # BLD AUTO: 198 10*3/MM3 (ref 140–450)
PMV BLD AUTO: 11.2 FL (ref 6–12)
POTASSIUM BLD-SCNC: 4.3 MMOL/L (ref 3.4–5)
PROT SERPL-MCNC: 7.1 G/DL (ref 6.3–8.6)
RBC # BLD AUTO: 4.65 10*6/MM3 (ref 4.14–5.8)
SODIUM BLD-SCNC: 142 MMOL/L (ref 137–145)
TRIGL SERPL-MCNC: 171 MG/DL
URATE SERPL-MCNC: 7.6 MG/DL (ref 3.5–8.5)
VLDLC SERPL-MCNC: 34.2 MG/DL
WBC NRBC COR # BLD: 8.59 10*3/MM3 (ref 3.4–10.8)

## 2020-01-28 PROCEDURE — 80053 COMPREHEN METABOLIC PANEL: CPT | Performed by: INTERNAL MEDICINE

## 2020-01-28 PROCEDURE — 80061 LIPID PANEL: CPT | Performed by: INTERNAL MEDICINE

## 2020-01-28 PROCEDURE — 82306 VITAMIN D 25 HYDROXY: CPT | Performed by: INTERNAL MEDICINE

## 2020-01-28 PROCEDURE — 83036 HEMOGLOBIN GLYCOSYLATED A1C: CPT | Performed by: INTERNAL MEDICINE

## 2020-01-28 PROCEDURE — 84550 ASSAY OF BLOOD/URIC ACID: CPT | Performed by: INTERNAL MEDICINE

## 2020-01-28 PROCEDURE — 85025 COMPLETE CBC W/AUTO DIFF WBC: CPT | Performed by: INTERNAL MEDICINE

## 2020-01-28 PROCEDURE — 36415 COLL VENOUS BLD VENIPUNCTURE: CPT | Performed by: INTERNAL MEDICINE

## 2020-02-04 ENCOUNTER — OFFICE VISIT (OUTPATIENT)
Dept: FAMILY MEDICINE CLINIC | Facility: CLINIC | Age: 80
End: 2020-02-04

## 2020-02-04 VITALS
BODY MASS INDEX: 42.69 KG/M2 | OXYGEN SATURATION: 94 % | SYSTOLIC BLOOD PRESSURE: 120 MMHG | HEIGHT: 67 IN | HEART RATE: 66 BPM | TEMPERATURE: 98 F | DIASTOLIC BLOOD PRESSURE: 70 MMHG | WEIGHT: 272 LBS

## 2020-02-04 DIAGNOSIS — Z12.5 SPECIAL SCREENING FOR MALIGNANT NEOPLASM OF PROSTATE: ICD-10-CM

## 2020-02-04 DIAGNOSIS — I10 ESSENTIAL HYPERTENSION: Chronic | ICD-10-CM

## 2020-02-04 DIAGNOSIS — N40.1 BENIGN PROSTATIC HYPERPLASIA WITH NOCTURIA: Chronic | ICD-10-CM

## 2020-02-04 DIAGNOSIS — I25.10 CORONARY ARTERIOSCLEROSIS: Chronic | ICD-10-CM

## 2020-02-04 DIAGNOSIS — K21.9 GASTROESOPHAGEAL REFLUX DISEASE, ESOPHAGITIS PRESENCE NOT SPECIFIED: Chronic | ICD-10-CM

## 2020-02-04 DIAGNOSIS — E79.0 HYPERURICEMIA: Chronic | ICD-10-CM

## 2020-02-04 DIAGNOSIS — M10.30 GOUT DUE TO RENAL IMPAIRMENT, UNSPECIFIED CHRONICITY, UNSPECIFIED SITE: Chronic | ICD-10-CM

## 2020-02-04 DIAGNOSIS — N18.2 TYPE 2 DIABETES MELLITUS WITH STAGE 2 CHRONIC KIDNEY DISEASE, WITHOUT LONG-TERM CURRENT USE OF INSULIN (HCC): Chronic | ICD-10-CM

## 2020-02-04 DIAGNOSIS — E78.2 MIXED HYPERLIPIDEMIA: Chronic | ICD-10-CM

## 2020-02-04 DIAGNOSIS — E11.22 TYPE 2 DIABETES MELLITUS WITH STAGE 2 CHRONIC KIDNEY DISEASE, WITHOUT LONG-TERM CURRENT USE OF INSULIN (HCC): Chronic | ICD-10-CM

## 2020-02-04 DIAGNOSIS — E78.1 HYPERTRIGLYCERIDEMIA: Chronic | ICD-10-CM

## 2020-02-04 DIAGNOSIS — G47.33 OBSTRUCTIVE SLEEP APNEA SYNDROME: Chronic | ICD-10-CM

## 2020-02-04 DIAGNOSIS — N18.30 CHRONIC KIDNEY DISEASE, STAGE 3 (HCC): Chronic | ICD-10-CM

## 2020-02-04 DIAGNOSIS — E11.42 DIABETIC PERIPHERAL NEUROPATHY (HCC): Primary | ICD-10-CM

## 2020-02-04 DIAGNOSIS — G47.9 SLEEP DISORDER: Chronic | ICD-10-CM

## 2020-02-04 DIAGNOSIS — E11.319 DIABETIC RETINOPATHY OF BOTH EYES ASSOCIATED WITH TYPE 2 DIABETES MELLITUS, MACULAR EDEMA PRESENCE UNSPECIFIED, UNSPECIFIED RETINOPATHY SEVERITY (HCC): Chronic | ICD-10-CM

## 2020-02-04 DIAGNOSIS — C61 MALIGNANT TUMOR OF PROSTATE (HCC): Chronic | ICD-10-CM

## 2020-02-04 DIAGNOSIS — E55.9 VITAMIN D DEFICIENCY: Chronic | ICD-10-CM

## 2020-02-04 DIAGNOSIS — R35.1 BENIGN PROSTATIC HYPERPLASIA WITH NOCTURIA: Chronic | ICD-10-CM

## 2020-02-04 DIAGNOSIS — J43.2 CENTRILOBULAR EMPHYSEMA (HCC): Chronic | ICD-10-CM

## 2020-02-04 PROCEDURE — G0009 ADMIN PNEUMOCOCCAL VACCINE: HCPCS | Performed by: INTERNAL MEDICINE

## 2020-02-04 PROCEDURE — 99214 OFFICE O/P EST MOD 30 MIN: CPT | Performed by: INTERNAL MEDICINE

## 2020-02-04 PROCEDURE — 90732 PPSV23 VACC 2 YRS+ SUBQ/IM: CPT | Performed by: INTERNAL MEDICINE

## 2020-02-04 RX ORDER — GABAPENTIN 600 MG/1
TABLET ORAL
Qty: 30 TABLET | Refills: 5 | Status: SHIPPED | OUTPATIENT
Start: 2020-02-04 | End: 2020-06-30

## 2020-02-04 NOTE — PROGRESS NOTES
Subjective        History of Present Illness     Eleno Savage is a 80 y.o. male who presents for 6-month follow up on type 2 diabetes mellitus, stage 2 chronic kidney disease, obesity, COPD, GERD, vitamin D deficiency, and high cholesterol/high triglycerides. He follows with Dr. Weathers, nephrologist annually for CKD and renal function remains relatively with no recent decline.  Dr. Mesa is his cardiologist.  Patient was treated for otitis media of left ear last month with resolution of symptoms with 10-day course of Omnicef and Floxin drops.   He is compliant with CPAP to manage sleep apnea symptoms.      He has a history of prostate cancer in 2001 with no evidence of recurrence.  He reports nocturia 1-2 times nightly.    He continues on prophylactic allopurinol and reports no recent gout flares.  Uric acid 7.6.     He doesn't feel the OTC Zyrtec 10 mg is working as well as it initially did to mange nasal congestion.  He reports 3-4 recent episodes over the past six months after being outdoors, especially on windy days.  He is only using his Flonase routinely, but only occasionally.  We discussed episodic use of Afrin.       He continues on Neurontin each evening for diabetic neuropathy and denies significant side effects including excessive daytime sedation.  Patient understands the risks associated with this controlled medication, including tolerance and addiction.      His diabetic retinopathy is followed by Dr. Jansen, who keeps him up to date on diabetic eye exams.     He is due Pneumovax. He had Prevnar 01/2017.  He had influenza vaccine 10/2019.     Weight is up 5 pounds in the past six months and up 12 pounds in the past year. I again voiced my concern about his continued weight gain. We reviewed dietary principles. His wife reports he doesn't really eat an evening meal, but tends to do a lot of late night snacking.  He reports he has decreased carbohydrates by eating less bread.  Blood pressure at goal.  " Despite weight gain, his diabetes remains excellently controlled without diabetic medication.  He continues regular diabetic eye exams with Dr. Jansen.       The patient's relevant past medical, surgical, and social history was reviewed in Epic.   Lab results are reviewed with the patient today.  CBC unremarkable. Fasting glucose 121.  A1c 6.2. Total cholesterol 112. HDL 25.  LDL 53.  Triglycerides 171.  Renal function remains stable within his baseline.  Creatinine 1.48.  Normal liver function.      Review of Systems   Constitutional: Negative for chills, fatigue and fever.   HENT: Negative for congestion, ear pain, postnasal drip, sinus pressure and sore throat.    Respiratory: Negative for cough, shortness of breath and wheezing.    Cardiovascular: Negative for chest pain, palpitations and leg swelling.   Gastrointestinal: Negative for abdominal pain, blood in stool, constipation, diarrhea, nausea and vomiting.   Endocrine: Negative for cold intolerance, heat intolerance, polydipsia and polyuria.   Genitourinary: Negative for dysuria, frequency, hematuria and urgency.   Skin: Negative for rash.   Neurological: Negative for syncope and weakness.        Objective     Visit Vitals  /70   Pulse 66   Temp 98 °F (36.7 °C) (Oral)   Ht 170.2 cm (67\")   Wt 123 kg (272 lb)   SpO2 94%   BMI 42.60 kg/m²     Physical Exam   Constitutional: He is oriented to person, place, and time. He appears well-developed and well-nourished. No distress.   Morbidly obese male.  Accompanied by his wife.    HENT:   Head: Normocephalic and atraumatic.   Nose: Right sinus exhibits no maxillary sinus tenderness and no frontal sinus tenderness. Left sinus exhibits no maxillary sinus tenderness and no frontal sinus tenderness.   Mouth/Throat: Uvula is midline, oropharynx is clear and moist and mucous membranes are normal. No oral lesions. No tonsillar exudate.   Eyes: Pupils are equal, round, and reactive to light. Conjunctivae and EOM are " normal.   Neck: Trachea normal. Neck supple. No JVD present. Carotid bruit is not present. No tracheal deviation present. No thyroid mass and no thyromegaly present.   Cardiovascular: Normal rate, regular rhythm, normal heart sounds and intact distal pulses.  No extrasystoles are present. PMI is not displaced.   No murmur heard.  Pulmonary/Chest: Effort normal and breath sounds normal. No accessory muscle usage. No respiratory distress. He has no decreased breath sounds. He has no wheezes. He has no rhonchi. He has no rales.   Abdominal: Soft. Bowel sounds are normal. He exhibits no distension. There is no hepatosplenomegaly. There is no tenderness.   Obese abdomen limits exam.      Vascular Status -  His right foot exhibits abnormal foot edema (1+ edema bilateral lower extremities with evidence of chronic venous insufficiency. ). His right foot exhibits normal foot vasculature . His left foot exhibits abnormal foot edema. His left foot exhibits normal foot vasculature .  Lymphadenopathy:     He has no cervical adenopathy.   Neurological: He is alert and oriented to person, place, and time. No cranial nerve deficit. Coordination normal.   Skin: Skin is warm, dry and intact. No rash noted. No cyanosis. Nails show no clubbing.   Psychiatric: He has a normal mood and affect. His speech is normal and behavior is normal. Judgment and thought content normal.   Vitals reviewed.        Assessment/Plan      Prescription refill given for Neurontin 600 mg to use one q.h.s. Patient understands the risks associated with this controlled medication, including tolerance and addiction.  He also agrees to only obtain this medication from me, and not from a another provider, unless that provider is covering for me in my absence.  He also agrees to be compliant in dosing, and not self adjust the dose of medication.  A signed controlled substance agreement is on file, and he has received a controlled substance education sheet at this a  previous visit.  He has also signed a consent for treatment with a controlled substance as per [facility] policy. NEFTALY was obtained.    After informed verbal consent, patient is given Pneumovax without difficulty or complications.  He tolerated well.      Resume the daily use of Flonase. He may want to switch from Zyrtec to Allegra to see if this manages symptoms better.  He can add Afrin nasal spray one spray each nostril 2-3 times daily as needed for no more than five days without taking a break.      I again voiced my concern about his continued weight gain.  I urged him to greatly intensify efforts at diabetic diet, exercise, and weight loss.  Weekly weights.    Forms are completed for disabled parking permit.      Continue to avoid NSAIDs and other nephrotoxic drugs.  Continue to follow with his nephrologist, Dr. Weathers, annually.     Continue other current medications and vitamin/mineral supplements to treat the other medical issues we addressed today.  Return in six months for follow up with fasting labs one week prior.      Scribed for Dr. Israel by Lacy Bustos [facility].     Diagnoses and all orders for this visit:    Diabetic peripheral neuropathy (CMS/HCC)  -     gabapentin (NEURONTIN) 600 MG tablet; 1 every night    Type 2 diabetes mellitus with stage 2 chronic kidney disease, without long-term current use of insulin (CMS/HCC)  -     CBC Auto Differential; Future  -     Comprehensive Metabolic Panel; Future  -     Hemoglobin A1c; Future  -     Microalbumin / Creatinine Urine Ratio - Urine, Clean Catch; Future  -     TSH; Future    Essential hypertension  -     Comprehensive Metabolic Panel; Future    Mixed hyperlipidemia  -     LDL Cholesterol, Direct; Future    Hypertriglyceridemia  -     LDL Cholesterol, Direct; Future    Coronary arteriosclerosis - followed by Dr. Farooq    Vitamin D deficiency  -     Vitamin D 25 Hydroxy; Future    Chronic kidney disease, stage 3 (CMS/HCC)    Benign prostatic  hyperplasia with nocturia    Gout due to renal impairment, unspecified chronicity, unspecified site  -     Uric Acid; Future    Hyperuricemia  -     Uric Acid; Future    Sleep disorder    Special screening for malignant neoplasm of prostate  -     PSA Screen; Future    Centrilobular emphysema (CMS/HCC)    Obstructive sleep apnea syndrome - on CPAP    Gastroesophageal reflux disease, esophagitis presence not specified    Diabetic retinopathy of both eyes associated with type 2 diabetes mellitus, macular edema presence unspecified, unspecified retinopathy severity (CMS/HCC)    Malignant tumor of prostate (CMS/HCC) - h/o 2001    Other orders  -     Pneumococcal Polysaccharide Vaccine 23-Valent Greater Than or Equal To 1yo Subcutaneous / IM        Lab on 01/28/2020   Component Date Value Ref Range Status   • WBC 01/28/2020 8.59  3.40 - 10.80 10*3/mm3 Final   • RBC 01/28/2020 4.65  4.14 - 5.80 10*6/mm3 Final   • Hemoglobin 01/28/2020 14.1  13.0 - 17.7 g/dL Final   • Hematocrit 01/28/2020 42.5  37.5 - 51.0 % Final   • MCV 01/28/2020 91.4  79.0 - 97.0 fL Final   • MCH 01/28/2020 30.3  26.6 - 33.0 pg Final   • MCHC 01/28/2020 33.2  31.5 - 35.7 g/dL Final   • RDW 01/28/2020 14.9  12.3 - 15.4 % Final   • RDW-SD 01/28/2020 48.4  37.0 - 54.0 fl Final   • MPV 01/28/2020 11.2  6.0 - 12.0 fL Final   • Platelets 01/28/2020 198  140 - 450 10*3/mm3 Final   • Neutrophil % 01/28/2020 61.6  42.7 - 76.0 % Final   • Lymphocyte % 01/28/2020 24.3  19.6 - 45.3 % Final   • Monocyte % 01/28/2020 8.6  5.0 - 12.0 % Final   • Eosinophil % 01/28/2020 5.0  0.3 - 6.2 % Final   • Basophil % 01/28/2020 0.5  0.0 - 1.5 % Final   • Neutrophils, Absolute 01/28/2020 5.29  1.70 - 7.00 10*3/mm3 Final   • Lymphocytes, Absolute 01/28/2020 2.09  0.70 - 3.10 10*3/mm3 Final   • Monocytes, Absolute 01/28/2020 0.74  0.10 - 0.90 10*3/mm3 Final   • Eosinophils, Absolute 01/28/2020 0.43* 0.00 - 0.40 10*3/mm3 Final   • Basophils, Absolute 01/28/2020 0.04  0.00 - 0.20  10*3/mm3 Final   • Glucose 01/28/2020 121* 70 - 99 mg/dL Final   • BUN 01/28/2020 23  7 - 23 mg/dL Final   • Creatinine 01/28/2020 1.48* 0.70 - 1.30 mg/dL Final   • Sodium 01/28/2020 142  137 - 145 mmol/L Final   • Potassium 01/28/2020 4.3  3.4 - 5.0 mmol/L Final   • Chloride 01/28/2020 111  101 - 112 mmol/L Final   • CO2 01/28/2020 23.0  22.0 - 30.0 mmol/L Final   • Calcium 01/28/2020 9.5  8.4 - 10.2 mg/dL Final   • Total Protein 01/28/2020 7.1  6.3 - 8.6 g/dL Final   • Albumin 01/28/2020 4.00  3.50 - 5.00 g/dL Final   • ALT (SGPT) 01/28/2020 23  <=50 U/L Final   • AST (SGOT) 01/28/2020 30  17 - 59 U/L Final   • Alkaline Phosphatase 01/28/2020 106  38 - 126 U/L Final   • Total Bilirubin 01/28/2020 0.3  0.2 - 1.3 mg/dL Final   • eGFR Non African Amer 01/28/2020 46  42 - 98 mL/min/1.73 Final   • Globulin 01/28/2020 3.1  2.3 - 3.5 gm/dL Final   • A/G Ratio 01/28/2020 1.3  1.1 - 1.8 g/dL Final   • BUN/Creatinine Ratio 01/28/2020 15.5  7.0 - 25.0 Final   • Anion Gap 01/28/2020 8.0  5.0 - 15.0 mmol/L Final   • Hemoglobin A1C 01/28/2020 6.20* 4.80 - 5.60 % Final   • Total Cholesterol 01/28/2020 112* 150 - 200 mg/dL Final   • Triglycerides 01/28/2020 171* <=150 mg/dL Final   • HDL Cholesterol 01/28/2020 25* 40 - 59 mg/dL Final   • LDL Cholesterol  01/28/2020 53  <=100 mg/dL Final   • VLDL Cholesterol 01/28/2020 34.2  mg/dL Final   • LDL/HDL Ratio 01/28/2020 2.11  0.00 - 3.55 Final   • 25 Hydroxy, Vitamin D 01/28/2020 47.8  30.0 - 100.0 ng/ml Final   • Uric Acid 01/28/2020 7.6  3.5 - 8.5 mg/dL Final   ]

## 2020-02-04 NOTE — PATIENT INSTRUCTIONS
Exercising to Lose Weight  Exercise is structured, repetitive physical activity to improve fitness and health. Getting regular exercise is important for everyone. It is especially important if you are overweight. Being overweight increases your risk of heart disease, stroke, diabetes, high blood pressure, and several types of cancer. Reducing your calorie intake and exercising can help you lose weight.  Exercise is usually categorized as moderate or vigorous intensity. To lose weight, most people need to do a certain amount of moderate-intensity or vigorous-intensity exercise each week.  Moderate-intensity exercise    Moderate-intensity exercise is any activity that gets you moving enough to burn at least three times more energy (calories) than if you were sitting.  Examples of moderate exercise include:  · Walking a mile in 15 minutes.  · Doing light yard work.  · Biking at an easy pace.  Most people should get at least 150 minutes (2 hours and 30 minutes) a week of moderate-intensity exercise to maintain their body weight.  Vigorous-intensity exercise  Vigorous-intensity exercise is any activity that gets you moving enough to burn at least six times more calories than if you were sitting. When you exercise at this intensity, you should be working hard enough that you are not able to carry on a conversation.  Examples of vigorous exercise include:  · Running.  · Playing a team sport, such as football, basketball, and soccer.  · Jumping rope.  Most people should get at least 75 minutes (1 hour and 15 minutes) a week of vigorous-intensity exercise to maintain their body weight.  How can exercise affect me?  When you exercise enough to burn more calories than you eat, you lose weight. Exercise also reduces body fat and builds muscle. The more muscle you have, the more calories you burn. Exercise also:  · Improves mood.  · Reduces stress and tension.  · Improves your overall fitness, flexibility, and  endurance.  · Increases bone strength.  The amount of exercise you need to lose weight depends on:  · Your age.  · The type of exercise.  · Any health conditions you have.  · Your overall physical ability.  Talk to your health care provider about how much exercise you need and what types of activities are safe for you.  What actions can I take to lose weight?  Nutrition    · Make changes to your diet as told by your health care provider or diet and nutrition specialist (dietitian). This may include:  ? Eating fewer calories.  ? Eating more protein.  ? Eating less unhealthy fats.  ? Eating a diet that includes fresh fruits and vegetables, whole grains, low-fat dairy products, and lean protein.  ? Avoiding foods with added fat, salt, and sugar.  · Drink plenty of water while you exercise to prevent dehydration or heat stroke.  Activity  · Choose an activity that you enjoy and set realistic goals. Your health care provider can help you make an exercise plan that works for you.  · Exercise at a moderate or vigorous intensity most days of the week.  ? The intensity of exercise may vary from person to person. You can tell how intense a workout is for you by paying attention to your breathing and heartbeat. Most people will notice their breathing and heartbeat get faster with more intense exercise.  · Do resistance training twice each week, such as:  ? Push-ups.  ? Sit-ups.  ? Lifting weights.  ? Using resistance bands.  · Getting short amounts of exercise can be just as helpful as long structured periods of exercise. If you have trouble finding time to exercise, try to include exercise in your daily routine.  ? Get up, stretch, and walk around every 30 minutes throughout the day.  ? Go for a walk during your lunch break.  ? Park your car farther away from your destination.  ? If you take public transportation, get off one stop early and walk the rest of the way.  ? Make phone calls while standing up and walking  around.  ? Take the stairs instead of elevators or escalators.  · Wear comfortable clothes and shoes with good support.  · Do not exercise so much that you hurt yourself, feel dizzy, or get very short of breath.  Where to find more information  · [place] Department of Health and Human Services: www.hhs.gov  · Centers for Disease Control and Prevention (CDC): www.cdc.gov  Contact a health care provider:  · Before starting a new exercise program.  · If you have questions or concerns about your weight.  · If you have a medical problem that keeps you from exercising.  Get help right away if you have any of the following while exercising:  · Injury.  · Dizziness.  · Difficulty breathing or shortness of breath that does not go away when you stop exercising.  · Chest pain.  · Rapid heartbeat.  Summary  · Being overweight increases your risk of heart disease, stroke, diabetes, high blood pressure, and several types of cancer.  · Losing weight happens when you burn more calories than you eat.  · Reducing the amount of calories you eat in addition to getting regular moderate or vigorous exercise each week helps you lose weight.  This information is not intended to replace advice given to you by your health care provider. Make sure you discuss any questions you have with your health care provider.  Document Released: 01/20/2012 Document Revised: 12/31/2018 Document Reviewed: 12/31/2018  [employer] Interactive Patient Education © 2019 [employer]      Calorie Counting for Weight Loss  Calories are units of energy. Your body needs a certain amount of calories from food to keep you going throughout the day. When you eat more calories than your body needs, your body stores the extra calories as fat. When you eat fewer calories than your body needs, your body burns fat to get the energy it needs.  Calorie counting means keeping track of how many calories you eat and drink each day. Calorie counting can be helpful if you need to lose  weight. If you make sure to eat fewer calories than your body needs, you should lose weight. Ask your health care provider what a healthy weight is for you.  For calorie counting to work, you will need to eat the right number of calories in a day in order to lose a healthy amount of weight per week. A dietitian can help you determine how many calories you need in a day and will give you suggestions on how to reach your calorie goal.  · A healthy amount of weight to lose per week is usually 1-2 lb (0.5-0.9 kg). This usually means that your daily calorie intake should be reduced by 500-750 calories.  · Eating 1,200 - 1,500 calories per day can help most women lose weight.  · Eating 1,500 - 1,800 calories per day can help most men lose weight.  What is my plan?  My goal is to have __________ calories per day.  If I have this many calories per day, I should lose around __________ pounds per week.  What do I need to know about calorie counting?  In order to meet your daily calorie goal, you will need to:  · Find out how many calories are in each food you would like to eat. Try to do this before you eat.  · Decide how much of the food you plan to eat.  · Write down what you ate and how many calories it had. Doing this is called keeping a food log.  To successfully lose weight, it is important to balance calorie counting with a healthy lifestyle that includes regular activity. Aim for 150 minutes of moderate exercise (such as walking) or 75 minutes of vigorous exercise (such as running) each week.  Where do I find calorie information?    The number of calories in a food can be found on a Nutrition Facts label. If a food does not have a Nutrition Facts label, try to look up the calories online or ask your dietitian for help.  Remember that calories are listed per serving. If you choose to have more than one serving of a food, you will have to multiply the calories per serving by the amount of servings you plan to eat. For  "example, the label on a package of bread might say that a serving size is 1 slice and that there are 90 calories in a serving. If you eat 1 slice, you will have eaten 90 calories. If you eat 2 slices, you will have eaten 180 calories.  How do I keep a food log?  Immediately after each meal, record the following information in your food log:  · What you ate. Don't forget to include toppings, sauces, and other extras on the food.  · How much you ate. This can be measured in cups, ounces, or number of items.  · How many calories each food and drink had.  · The total number of calories in the meal.  Keep your food log near you, such as in a small notebook in your pocket, or use a mobile kelby or website. Some programs will calculate calories for you and show you how many calories you have left for the day to meet your goal.  What are some calorie counting tips?    · Use your calories on foods and drinks that will fill you up and not leave you hungry:  ? Some examples of foods that fill you up are nuts and nut butters, vegetables, lean proteins, and high-fiber foods like whole grains. High-fiber foods are foods with more than 5 g fiber per serving.  ? Drinks such as sodas, specialty coffee drinks, alcohol, and juices have a lot of calories, yet do not fill you up.  · Eat nutritious foods and avoid empty calories. Empty calories are calories you get from foods or beverages that do not have many vitamins or protein, such as candy, sweets, and soda. It is better to have a nutritious high-calorie food (such as an avocado) than a food with few nutrients (such as a bag of chips).  · Know how many calories are in the foods you eat most often. This will help you calculate calorie counts faster.  · Pay attention to calories in drinks. Low-calorie drinks include water and unsweetened drinks.  · Pay attention to nutrition labels for \"low fat\" or \"fat free\" foods. These foods sometimes have the same amount of calories or more calories " than the full fat versions. They also often have added sugar, starch, or salt, to make up for flavor that was removed with the fat.  · Find a way of tracking calories that works for you. Get creative. Try different apps or programs if writing down calories does not work for you.  What are some portion control tips?  · Know how many calories are in a serving. This will help you know how many servings of a certain food you can have.  · Use a measuring cup to measure serving sizes. You could also try weighing out portions on a kitchen scale. With time, you will be able to estimate serving sizes for some foods.  · Take some time to put servings of different foods on your favorite plates, bowls, and cups so you know what a serving looks like.  · Try not to eat straight from a bag or box. Doing this can lead to overeating. Put the amount you would like to eat in a cup or on a plate to make sure you are eating the right portion.  · Use smaller plates, glasses, and bowls to prevent overeating.  · Try not to multitask (for example, watch TV or use your computer) while eating. If it is time to eat, sit down at a table and enjoy your food. This will help you to know when you are full. It will also help you to be aware of what you are eating and how much you are eating.  What are tips for following this plan?  Reading food labels  · Check the calorie count compared to the serving size. The serving size may be smaller than what you are used to eating.  · Check the source of the calories. Make sure the food you are eating is high in vitamins and protein and low in saturated and trans fats.  Shopping  · Read nutrition labels while you shop. This will help you make healthy decisions before you decide to purchase your food.  · Make a grocery list and stick to it.  Cooking  · Try to cook your favorite foods in a healthier way. For example, try baking instead of frying.  · Use low-fat dairy products.  Meal planning  · Use more fruits  "and vegetables. Half of your plate should be fruits and vegetables.  · Include lean proteins like poultry and fish.  How do I count calories when eating out?  · Ask for smaller portion sizes.  · Consider sharing an entree and sides instead of getting your own entree.  · If you get your own entree, eat only half. Ask for a box at the beginning of your meal and put the rest of your entree in it so you are not tempted to eat it.  · If calories are listed on the menu, choose the lower calorie options.  · Choose dishes that include vegetables, fruits, whole grains, low-fat dairy products, and lean protein.  · Choose items that are boiled, broiled, grilled, or steamed. Stay away from items that are buttered, battered, fried, or served with cream sauce. Items labeled \"crispy\" are usually fried, unless stated otherwise.  · Choose water, low-fat milk, unsweetened iced tea, or other drinks without added sugar. If you want an alcoholic beverage, choose a lower calorie option such as a glass of wine or light beer.  · Ask for dressings, sauces, and syrups on the side. These are usually high in calories, so you should limit the amount you eat.  · If you want a salad, choose a garden salad and ask for grilled meats. Avoid extra toppings like baum, cheese, or fried items. Ask for the dressing on the side, or ask for olive oil and vinegar or lemon to use as dressing.  · Estimate how many servings of a food you are given. For example, a serving of cooked rice is ½ cup or about the size of half a baseball. Knowing serving sizes will help you be aware of how much food you are eating at restaurants. The list below tells you how big or small some common portion sizes are based on everyday objects:  ? 1 oz--4 stacked dice.  ? 3 oz--1 deck of cards.  ? 1 tsp--1 die.  ? 1 Tbsp--½ a ping-pong ball.  ? 2 Tbsp--1 ping-pong ball.  ? ½ cup--½ baseball.  ? 1 cup--1 baseball.  Summary  · Calorie counting means keeping track of how many calories " you eat and drink each day. If you eat fewer calories than your body needs, you should lose weight.  · A healthy amount of weight to lose per week is usually 1-2 lb (0.5-0.9 kg). This usually means reducing your daily calorie intake by 500-750 calories.  · The number of calories in a food can be found on a Nutrition Facts label. If a food does not have a Nutrition Facts label, try to look up the calories online or ask your dietitian for help.  · Use your calories on foods and drinks that will fill you up, and not on foods and drinks that will leave you hungry.  · Use smaller plates, glasses, and bowls to prevent overeating.  This information is not intended to replace advice given to you by your health care provider. Make sure you discuss any questions you have with your health care provider.  Document Released: 12/18/2006 Document Revised: 09/06/2019 Document Reviewed: 11/17/2017  [employer] Interactive Patient Education © 2019 [employer]

## 2020-04-21 ENCOUNTER — TRANSCRIBE ORDERS (OUTPATIENT)
Dept: LAB | Facility: OTHER | Age: 80
End: 2020-04-21

## 2020-04-21 DIAGNOSIS — N18.30 CHRONIC KIDNEY DISEASE, STAGE III (MODERATE) (HCC): Primary | ICD-10-CM

## 2020-05-12 NOTE — PATIENT INSTRUCTIONS
Calorie Counting for Weight Loss  Calories are energy you get from the things you eat and drink. Your body uses this energy to keep you going throughout the day. The number of calories you eat affects your weight. When you eat more calories than your body needs, your body stores the extra calories as fat. When you eat fewer calories than your body needs, your body burns fat to get the energy it needs.  Calorie counting means keeping track of how many calories you eat and drink each day. If you make sure to eat fewer calories than your body needs, you should lose weight. In order for calorie counting to work, you will need to eat the number of calories that are right for you in a day to lose a healthy amount of weight per week. A healthy amount of weight to lose per week is usually 1-2 lb (0.5-0.9 kg). A dietitian can determine how many calories you need in a day and give you suggestions on how to reach your calorie goal.   WHAT IS MY MY PLAN?  My goal is to have __________ calories per day.   If I have this many calories per day, I should lose around __________ pounds per week.  WHAT DO I NEED TO KNOW ABOUT CALORIE COUNTING?  In order to meet your daily calorie goal, you will need to:  · Find out how many calories are in each food you would like to eat. Try to do this before you eat.  · Decide how much of the food you can eat.  · Write down what you ate and how many calories it had. Doing this is called keeping a food log.  WHERE DO I FIND CALORIE INFORMATION?  The number of calories in a food can be found on a Nutrition Facts label. Note that all the information on a label is based on a specific serving of the food. If a food does not have a Nutrition Facts label, try to look up the calories online or ask your dietitian for help.  HOW DO I DECIDE HOW MUCH TO EAT?  To decide how much of the food you can eat, you will need to consider both the number of calories in one serving and the size of one serving. This  information can be found on the Nutrition Facts label. If a food does not have a Nutrition Facts label, look up the information online or ask your dietitian for help.  Remember that calories are listed per serving. If you choose to have more than one serving of a food, you will have to multiply the calories per serving by the amount of servings you plan to eat. For example, the label on a package of bread might say that a serving size is 1 slice and that there are 90 calories in a serving. If you eat 1 slice, you will have eaten 90 calories. If you eat 2 slices, you will have eaten 180 calories.  HOW DO I KEEP A FOOD LOG?  After each meal, record the following information in your food log:  · What you ate.  · How much of it you ate.  · How many calories it had.  · Then, add up your calories.  Keep your food log near you, such as in a small notebook in your pocket. Another option is to use a mobile kelby or website. Some programs will calculate calories for you and show you how many calories you have left each time you add an item to the log.  WHAT ARE SOME CALORIE COUNTING TIPS?  · Use your calories on foods and drinks that will fill you up and not leave you hungry. Some examples of this include foods like nuts and nut butters, vegetables, lean proteins, and high-fiber foods (more than 5 g fiber per serving).  · Eat nutritious foods and avoid empty calories. Empty calories are calories you get from foods or beverages that do not have many nutrients, such as candy and soda. It is better to have a nutritious high-calorie food (such as an avocado) than a food with few nutrients (such as a bag of chips).  · Know how many calories are in the foods you eat most often. This way, you do not have to look up how many calories they have each time you eat them.  · Look out for foods that may seem like low-calorie foods but are really high-calorie foods, such as baked goods, soda, and fat-free candy.  · Pay attention to calories  in drinks. Drinks such as sodas, specialty coffee drinks, alcohol, and juices have a lot of calories yet do not fill you up. Choose low-calorie drinks like water and diet drinks.  · Focus your calorie counting efforts on higher calorie items. Logging the calories in a garden salad that contains only vegetables is less important than calculating the calories in a milk shake.  · Find a way of tracking calories that works for you. Get creative. Most people who are successful find ways to keep track of how much they eat in a day, even if they do not count every calorie.  WHAT ARE SOME PORTION CONTROL TIPS?  · Know how many calories are in a serving. This will help you know how many servings of a certain food you can have.  · Use a measuring cup to measure serving sizes. This is helpful when you start out. With time, you will be able to estimate serving sizes for some foods.  · Take some time to put servings of different foods on your favorite plates, bowls, and cups so you know what a serving looks like.  · Try not to eat straight from a bag or box. Doing this can lead to overeating. Put the amount you would like to eat in a cup or on a plate to make sure you are eating the right portion.  · Use smaller plates, glasses, and bowls to prevent overeating. This is a quick and easy way to practice portion control. If your plate is smaller, less food can fit on it.  · Try not to multitask while eating, such as watching TV or using your computer. If it is time to eat, sit down at a table and enjoy your food. Doing this will help you to start recognizing when you are full. It will also make you more aware of what and how much you are eating.  HOW CAN I CALORIE COUNT WHEN EATING OUT?  · Ask for smaller portion sizes or child-sized portions.  · Consider sharing an entree and sides instead of getting your own entree.  · If you get your own entree, eat only half. Ask for a box at the beginning of your meal and put the rest of your  "entree in it so you are not tempted to eat it.  · Look for the calories on the menu. If calories are listed, choose the lower calorie options.  · Choose dishes that include vegetables, fruits, whole grains, low-fat dairy products, and lean protein. Focusing on smart food choices from each of the 5 food groups can help you stay on track at restaurants.  · Choose items that are boiled, broiled, grilled, or steamed.  · Choose water, milk, unsweetened iced tea, or other drinks without added sugars. If you want an alcoholic beverage, choose a lower calorie option. For example, a regular buster can have up to 700 calories and a glass of wine has around 150.  · Stay away from items that are buttered, battered, fried, or served with cream sauce. Items labeled \"crispy\" are usually fried, unless stated otherwise.  · Ask for dressings, sauces, and syrups on the side. These are usually very high in calories, so do not eat much of them.  · Watch out for salads. Many people think salads are a healthy option, but this is often not the case. Many salads come with baum, fried chicken, lots of cheese, fried chips, and dressing. All of these items have a lot of calories. If you want a salad, choose a garden salad and ask for grilled meats or steak. Ask for the dressing on the side, or ask for olive oil and vinegar or lemon to use as dressing.  · Estimate how many servings of a food you are given. For example, a serving of cooked rice is ½ cup or about the size of half a tennis ball or one cupcake wrapper. Knowing serving sizes will help you be aware of how much food you are eating at restaurants. The list below tells you how big or small some common portion sizes are based on everyday objects.    1 oz--4 stacked dice.    3 oz--1 deck of cards.    1 tsp--1 dice.    1 Tbsp--½ a Ping-Pong ball.    2 Tbsp--1 Ping-Pong ball.    ½ cup--1 tennis ball or 1 cupcake wrapper.    1 cup--1 baseball.     This information is not intended to " replace advice given to you by your health care provider. Make sure you discuss any questions you have with your health care provider.     Document Released: 12/18/2006 Document Revised: 01/08/2016 Document Reviewed: 10/23/2014  [employer] Interactive Patient Education ©2017 [employer]      Diabetes and Exercise  Exercising regularly is important. It is not just about losing weight. It has many health benefits, such as:  · Improving your overall fitness, flexibility, and endurance.  · Increasing your bone density.  · Helping with weight control.  · Decreasing your body fat.  · Increasing your muscle strength.  · Reducing stress and tension.  · Improving your overall health.  People with diabetes who exercise gain additional benefits because exercise:  · Reduces appetite.  · Improves the body's use of blood sugar (glucose).  · Helps lower or control blood glucose.  · Decreases blood pressure.  · Helps control blood lipids (such as cholesterol and triglycerides).  · Improves the body's use of the hormone insulin by:    Increasing the body's insulin sensitivity.    Reducing the body's insulin needs.  · Decreases the risk for heart disease because exercising:    Lowers cholesterol and triglycerides levels.    Increases the levels of good cholesterol (such as high-density lipoproteins [HDL]) in the body.    Lowers blood glucose levels.  YOUR ACTIVITY PLAN   Choose an activity that you enjoy, and set realistic goals. To exercise safely, you should begin practicing any new physical activity slowly, and gradually increase the intensity of the exercise over time. Your health care provider or diabetes educator can help create an activity plan that works for you. General recommendations include:  · Encouraging children to engage in at least 60 minutes of physical activity each day.  · Stretching and performing strength training exercises, such as yoga or weight lifting, at least 2 times per week.  · Performing a total of at  least 150 minutes of moderate-intensity exercise each week, such as brisk walking or water aerobics.  · Exercising at least 3 days per week, making sure you allow no more than 2 consecutive days to pass without exercising.  · Avoiding long periods of inactivity (90 minutes or more). When you have to spend an extended period of time sitting down, take frequent breaks to walk or stretch.  RECOMMENDATIONS FOR EXERCISING WITH TYPE 1 OR TYPE 2 DIABETES   · Check your blood glucose before exercising. If blood glucose levels are greater than 240 mg/dL, check for urine ketones. Do not exercise if ketones are present.  · Avoid injecting insulin into areas of the body that are going to be exercised. For example, avoid injecting insulin into:    The arms when playing tennis.    The legs when jogging.  · Keep a record of:    Food intake before and after you exercise.    Expected peak times of insulin action.    Blood glucose levels before and after you exercise.    The type and amount of exercise you have done.  · Review your records with your health care provider. Your health care provider will help you to develop guidelines for adjusting food intake and insulin amounts before and after exercising.  · If you take insulin or oral hypoglycemic agents, watch for signs and symptoms of hypoglycemia. They include:    Dizziness.    Shaking.    Sweating.    Chills.    Confusion.  · Drink plenty of water while you exercise to prevent dehydration or heat stroke. Body water is lost during exercise and must be replaced.  · Talk to your health care provider before starting an exercise program to make sure it is safe for you. Remember, almost any type of activity is better than none.     This information is not intended to replace advice given to you by your health care provider. Make sure you discuss any questions you have with your health care provider.     Document Released: 03/09/2005 Document Revised: 04/10/2017 Document Reviewed:  05/27/2014  [employer] Interactive Patient Education ©2017 [employer]       Composite Graft Text: The defect edges were debeveled with a #15 scalpel blade.  Given the location of the defect, shape of the defect, the proximity to free margins and the fact the defect was full thickness a composite graft was deemed most appropriate.  The defect was outline and then transferred to the donor site.  A full thickness graft was then excised from the donor site. The graft was then placed in the primary defect, oriented appropriately and then sutured into place.  The secondary defect was then repaired using a primary closure.

## 2020-06-01 ENCOUNTER — TRANSCRIBE ORDERS (OUTPATIENT)
Dept: LAB | Facility: OTHER | Age: 80
End: 2020-06-01

## 2020-06-01 DIAGNOSIS — N18.30 CHRONIC KIDNEY DISEASE, STAGE 3 (HCC): Primary | ICD-10-CM

## 2020-06-16 ENCOUNTER — OFFICE VISIT (OUTPATIENT)
Dept: OTOLARYNGOLOGY | Facility: CLINIC | Age: 80
End: 2020-06-16

## 2020-06-16 VITALS — BODY MASS INDEX: 39.71 KG/M2 | HEIGHT: 68 IN | OXYGEN SATURATION: 96 % | WEIGHT: 262 LBS

## 2020-06-16 DIAGNOSIS — H60.8X2 NONINFECTIOUS OTITIS EXTERNA OF LEFT EAR, UNSPECIFIED CHRONICITY, UNSPECIFIED TYPE: ICD-10-CM

## 2020-06-16 DIAGNOSIS — G47.33 OBSTRUCTIVE SLEEP APNEA: Primary | ICD-10-CM

## 2020-06-16 PROCEDURE — 99213 OFFICE O/P EST LOW 20 MIN: CPT | Performed by: OTOLARYNGOLOGY

## 2020-06-16 NOTE — PROGRESS NOTES
Subjective   Eleno Savage is a 80 y.o. male.       History of Present Illness   Patient is followed with obstructive sleep apnea syndrome.  Utilizes a CPAP device with excellent compliance.  Is here for his annual visit.  Brings compliance data for the last 3 months which as always shows excellent compliance with over 8 hours of use per night and 0 days with no usage.  No significant large leak.  Also wears hearing aids and states his left ear is somewhat stopped up.      The following portions of the patient's history were reviewed and updated as appropriate: allergies, current medications, past family history, past medical history, past social history, past surgical history and problem list.     reports that he quit smoking about 35 years ago. His smoking use included cigarettes. He has a 82.00 pack-year smoking history. He has never used smokeless tobacco. He reports that he does not drink alcohol or use drugs.   Patient is not a tobacco user and has not been counseled for use of tobacco products      Review of Systems   Constitutional: Negative for fever.           Objective   Physical Exam  General: Well-developed well-nourished male in no acute distress.  Alert and oriented x3.  Voice:Strong. Speech:Fluent  Ears: External ears no deformity, right ear canal shows no discharge.  Tympanic membrane intact no infection or effusion.  Left ear shows some uninfected squamous debris that is cleaned under the microscope using instrumentation.  Beyond this tympanic membrane is intact with no infection or effusion  Nose: Nares show no discharge mass polyp or purulence.  Boggy mucosa is present.  No gross external deformity.    Oral cavity: Lips and gums without lesions.  Tongue and floor of mouth without lesions.  Parotid and submandibular ducts unobstructed.  No mucosal lesions on the buccal mucosa or vestibule of the mouth.  Pharynx: No erythema exudate mass or ulcer  Neck: No lymphadenopathy.  No thyromegaly.   Trachea and larynx midline.  No masses in the parotid or submandibular glands.      Assessment/Plan   Eleno was seen today for follow-up.    Diagnoses and all orders for this visit:    Obstructive sleep apnea    Noninfectious otitis externa of left ear, unspecified chronicity, unspecified type      Plan: Continue CPAP use nightly.  Ear cleaned as described above.  Return in 1 year, call sooner for problems.

## 2020-06-30 DIAGNOSIS — E11.42 DIABETIC PERIPHERAL NEUROPATHY (HCC): ICD-10-CM

## 2020-06-30 RX ORDER — GABAPENTIN 600 MG/1
TABLET ORAL
Qty: 30 TABLET | Refills: 1 | Status: SHIPPED | OUTPATIENT
Start: 2020-06-30 | End: 2020-08-31

## 2020-07-13 ENCOUNTER — LAB (OUTPATIENT)
Dept: LAB | Facility: OTHER | Age: 80
End: 2020-07-13

## 2020-07-13 ENCOUNTER — TRANSCRIBE ORDERS (OUTPATIENT)
Dept: GENERAL RADIOLOGY | Facility: CLINIC | Age: 80
End: 2020-07-13

## 2020-07-13 DIAGNOSIS — N18.30 CHRONIC KIDNEY DISEASE, STAGE III (MODERATE) (HCC): ICD-10-CM

## 2020-07-13 DIAGNOSIS — N18.30 CHRONIC KIDNEY DISEASE, STAGE III (MODERATE) (HCC): Primary | ICD-10-CM

## 2020-07-13 DIAGNOSIS — N18.30 CHRONIC KIDNEY DISEASE, STAGE 3 (HCC): ICD-10-CM

## 2020-07-13 LAB
ALBUMIN SERPL-MCNC: 4 G/DL (ref 3.5–5)
ANION GAP SERPL CALCULATED.3IONS-SCNC: 9 MMOL/L (ref 5–15)
BUN SERPL-MCNC: 22 MG/DL (ref 7–23)
BUN/CREAT SERPL: 15.1 (ref 7–25)
CALCIUM SPEC-SCNC: 9.6 MG/DL (ref 8.4–10.2)
CHLORIDE SERPL-SCNC: 106 MMOL/L (ref 101–112)
CO2 SERPL-SCNC: 26 MMOL/L (ref 22–30)
CREAT SERPL-MCNC: 1.46 MG/DL (ref 0.7–1.3)
CREAT UR-MCNC: 73.4 MG/DL
GFR SERPL CREATININE-BSD FRML MDRD: 46 ML/MIN/1.73 (ref 42–98)
GLUCOSE SERPL-MCNC: 137 MG/DL (ref 70–99)
PHOSPHATE SERPL-MCNC: 3.3 MG/DL (ref 2.5–4.5)
POTASSIUM SERPL-SCNC: 4.4 MMOL/L (ref 3.4–5)
PROT UR-MCNC: 7 MG/DL
PROT/CREAT UR: 95.4 MG/G CREA (ref 0–200)
PTH-INTACT SERPL-MCNC: 27.9 PG/ML (ref 15–65)
SODIUM SERPL-SCNC: 141 MMOL/L (ref 137–145)

## 2020-07-13 PROCEDURE — 36415 COLL VENOUS BLD VENIPUNCTURE: CPT | Performed by: INTERNAL MEDICINE

## 2020-07-13 PROCEDURE — 80069 RENAL FUNCTION PANEL: CPT | Performed by: INTERNAL MEDICINE

## 2020-07-13 PROCEDURE — 82570 ASSAY OF URINE CREATININE: CPT | Performed by: INTERNAL MEDICINE

## 2020-07-13 PROCEDURE — 83970 ASSAY OF PARATHORMONE: CPT | Performed by: INTERNAL MEDICINE

## 2020-07-13 PROCEDURE — 84156 ASSAY OF PROTEIN URINE: CPT | Performed by: INTERNAL MEDICINE

## 2020-07-14 RX ORDER — TAMSULOSIN HYDROCHLORIDE 0.4 MG/1
1 CAPSULE ORAL NIGHTLY
Qty: 90 CAPSULE | Refills: 3 | Status: SHIPPED | OUTPATIENT
Start: 2020-07-14 | End: 2021-07-30

## 2020-07-21 ENCOUNTER — TRANSCRIBE ORDERS (OUTPATIENT)
Dept: GENERAL RADIOLOGY | Facility: CLINIC | Age: 80
End: 2020-07-21

## 2020-07-21 DIAGNOSIS — N18.30 CHRONIC KIDNEY DISEASE, STAGE III (MODERATE) (HCC): Primary | ICD-10-CM

## 2020-07-24 RX ORDER — CARVEDILOL 3.12 MG/1
TABLET ORAL
Qty: 60 TABLET | Refills: 11 | Status: SHIPPED | OUTPATIENT
Start: 2020-07-24 | End: 2021-07-30

## 2020-08-03 ENCOUNTER — LAB (OUTPATIENT)
Dept: LAB | Facility: OTHER | Age: 80
End: 2020-08-03

## 2020-08-03 DIAGNOSIS — E55.9 VITAMIN D DEFICIENCY: Chronic | ICD-10-CM

## 2020-08-03 DIAGNOSIS — E78.1 HYPERTRIGLYCERIDEMIA: Chronic | ICD-10-CM

## 2020-08-03 DIAGNOSIS — E11.22 TYPE 2 DIABETES MELLITUS WITH STAGE 2 CHRONIC KIDNEY DISEASE, WITHOUT LONG-TERM CURRENT USE OF INSULIN (HCC): Chronic | ICD-10-CM

## 2020-08-03 DIAGNOSIS — M10.30 GOUT DUE TO RENAL IMPAIRMENT, UNSPECIFIED CHRONICITY, UNSPECIFIED SITE: Chronic | ICD-10-CM

## 2020-08-03 DIAGNOSIS — Z12.5 SPECIAL SCREENING FOR MALIGNANT NEOPLASM OF PROSTATE: ICD-10-CM

## 2020-08-03 DIAGNOSIS — I10 ESSENTIAL HYPERTENSION: Chronic | ICD-10-CM

## 2020-08-03 DIAGNOSIS — E79.0 HYPERURICEMIA: Chronic | ICD-10-CM

## 2020-08-03 DIAGNOSIS — E78.2 MIXED HYPERLIPIDEMIA: Chronic | ICD-10-CM

## 2020-08-03 DIAGNOSIS — N18.2 TYPE 2 DIABETES MELLITUS WITH STAGE 2 CHRONIC KIDNEY DISEASE, WITHOUT LONG-TERM CURRENT USE OF INSULIN (HCC): Chronic | ICD-10-CM

## 2020-08-03 LAB
25(OH)D3 SERPL-MCNC: 47.2 NG/ML (ref 30–100)
ALBUMIN SERPL-MCNC: 4.1 G/DL (ref 3.5–5)
ALBUMIN UR-MCNC: <1.2 MG/DL
ALBUMIN/GLOB SERPL: 1.4 G/DL (ref 1.1–1.8)
ALP SERPL-CCNC: 107 U/L (ref 38–126)
ALT SERPL W P-5'-P-CCNC: 17 U/L
ANION GAP SERPL CALCULATED.3IONS-SCNC: 8 MMOL/L (ref 5–15)
ARTICHOKE IGE QN: 65 MG/DL (ref 0–100)
AST SERPL-CCNC: 30 U/L (ref 17–59)
BASOPHILS # BLD AUTO: 0.02 10*3/MM3 (ref 0–0.2)
BASOPHILS NFR BLD AUTO: 0.2 % (ref 0–1.5)
BILIRUB SERPL-MCNC: 0.1 MG/DL (ref 0.2–1.3)
BUN SERPL-MCNC: 21 MG/DL (ref 7–23)
BUN/CREAT SERPL: 15.7 (ref 7–25)
CALCIUM SPEC-SCNC: 9.8 MG/DL (ref 8.4–10.2)
CHLORIDE SERPL-SCNC: 105 MMOL/L (ref 101–112)
CO2 SERPL-SCNC: 28 MMOL/L (ref 22–30)
CREAT SERPL-MCNC: 1.34 MG/DL (ref 0.7–1.3)
CREAT UR-MCNC: 49.6 MG/DL
DEPRECATED RDW RBC AUTO: 47.7 FL (ref 37–54)
EOSINOPHIL # BLD AUTO: 0.31 10*3/MM3 (ref 0–0.4)
EOSINOPHIL NFR BLD AUTO: 3.6 % (ref 0.3–6.2)
ERYTHROCYTE [DISTWIDTH] IN BLOOD BY AUTOMATED COUNT: 14.6 % (ref 12.3–15.4)
GFR SERPL CREATININE-BSD FRML MDRD: 51 ML/MIN/1.73 (ref 42–98)
GLOBULIN UR ELPH-MCNC: 3 GM/DL (ref 2.3–3.5)
GLUCOSE SERPL-MCNC: 125 MG/DL (ref 70–99)
HBA1C MFR BLD: 6.1 % (ref 4.8–5.6)
HCT VFR BLD AUTO: 42.3 % (ref 37.5–51)
HGB BLD-MCNC: 14.3 G/DL (ref 13–17.7)
LYMPHOCYTES # BLD AUTO: 2.13 10*3/MM3 (ref 0.7–3.1)
LYMPHOCYTES NFR BLD AUTO: 25.1 % (ref 19.6–45.3)
MCH RBC QN AUTO: 30.8 PG (ref 26.6–33)
MCHC RBC AUTO-ENTMCNC: 33.8 G/DL (ref 31.5–35.7)
MCV RBC AUTO: 91 FL (ref 79–97)
MICROALBUMIN/CREAT UR: NORMAL MG/G{CREAT}
MONOCYTES # BLD AUTO: 0.69 10*3/MM3 (ref 0.1–0.9)
MONOCYTES NFR BLD AUTO: 8.1 % (ref 5–12)
NEUTROPHILS NFR BLD AUTO: 5.35 10*3/MM3 (ref 1.7–7)
NEUTROPHILS NFR BLD AUTO: 63 % (ref 42.7–76)
PLATELET # BLD AUTO: 186 10*3/MM3 (ref 140–450)
PMV BLD AUTO: 11.6 FL (ref 6–12)
POTASSIUM SERPL-SCNC: 4.4 MMOL/L (ref 3.4–5)
PROT SERPL-MCNC: 7.1 G/DL (ref 6.3–8.6)
PSA SERPL-MCNC: 0.1 NG/ML (ref 0–4)
RBC # BLD AUTO: 4.65 10*6/MM3 (ref 4.14–5.8)
SODIUM SERPL-SCNC: 141 MMOL/L (ref 137–145)
TSH SERPL DL<=0.05 MIU/L-ACNC: 1.8 UIU/ML (ref 0.27–4.2)
URATE SERPL-MCNC: 6.6 MG/DL (ref 3.5–8.5)
WBC # BLD AUTO: 8.5 10*3/MM3 (ref 3.4–10.8)

## 2020-08-03 PROCEDURE — 84443 ASSAY THYROID STIM HORMONE: CPT | Performed by: INTERNAL MEDICINE

## 2020-08-03 PROCEDURE — 82570 ASSAY OF URINE CREATININE: CPT | Performed by: INTERNAL MEDICINE

## 2020-08-03 PROCEDURE — G0103 PSA SCREENING: HCPCS | Performed by: INTERNAL MEDICINE

## 2020-08-03 PROCEDURE — 36415 COLL VENOUS BLD VENIPUNCTURE: CPT | Performed by: INTERNAL MEDICINE

## 2020-08-03 PROCEDURE — 82306 VITAMIN D 25 HYDROXY: CPT | Performed by: INTERNAL MEDICINE

## 2020-08-03 PROCEDURE — 84550 ASSAY OF BLOOD/URIC ACID: CPT | Performed by: INTERNAL MEDICINE

## 2020-08-03 PROCEDURE — 85025 COMPLETE CBC W/AUTO DIFF WBC: CPT | Performed by: INTERNAL MEDICINE

## 2020-08-03 PROCEDURE — 83721 ASSAY OF BLOOD LIPOPROTEIN: CPT | Performed by: INTERNAL MEDICINE

## 2020-08-03 PROCEDURE — 82043 UR ALBUMIN QUANTITATIVE: CPT | Performed by: INTERNAL MEDICINE

## 2020-08-03 PROCEDURE — 80053 COMPREHEN METABOLIC PANEL: CPT | Performed by: INTERNAL MEDICINE

## 2020-08-03 PROCEDURE — 83036 HEMOGLOBIN GLYCOSYLATED A1C: CPT | Performed by: INTERNAL MEDICINE

## 2020-08-10 ENCOUNTER — OFFICE VISIT (OUTPATIENT)
Dept: FAMILY MEDICINE CLINIC | Facility: CLINIC | Age: 80
End: 2020-08-10

## 2020-08-10 VITALS
TEMPERATURE: 99.3 F | WEIGHT: 259 LBS | HEART RATE: 67 BPM | OXYGEN SATURATION: 96 % | DIASTOLIC BLOOD PRESSURE: 56 MMHG | SYSTOLIC BLOOD PRESSURE: 120 MMHG | HEIGHT: 68 IN | BODY MASS INDEX: 39.25 KG/M2

## 2020-08-10 DIAGNOSIS — E79.0 HYPERURICEMIA: Chronic | ICD-10-CM

## 2020-08-10 DIAGNOSIS — N18.30 CHRONIC KIDNEY DISEASE, STAGE 3 (HCC): Chronic | ICD-10-CM

## 2020-08-10 DIAGNOSIS — N18.30 TYPE 2 DIABETES MELLITUS WITH STAGE 3 CHRONIC KIDNEY DISEASE, WITHOUT LONG-TERM CURRENT USE OF INSULIN (HCC): Primary | ICD-10-CM

## 2020-08-10 DIAGNOSIS — M10.30 GOUT DUE TO RENAL IMPAIRMENT, UNSPECIFIED CHRONICITY, UNSPECIFIED SITE: Chronic | ICD-10-CM

## 2020-08-10 DIAGNOSIS — E66.01 CLASS 3 SEVERE OBESITY DUE TO EXCESS CALORIES WITH SERIOUS COMORBIDITY AND BODY MASS INDEX (BMI) OF 40.0 TO 44.9 IN ADULT (HCC): Chronic | ICD-10-CM

## 2020-08-10 DIAGNOSIS — C61 MALIGNANT TUMOR OF PROSTATE (HCC): Chronic | ICD-10-CM

## 2020-08-10 DIAGNOSIS — J43.2 CENTRILOBULAR EMPHYSEMA (HCC): Chronic | ICD-10-CM

## 2020-08-10 DIAGNOSIS — E78.2 MIXED HYPERLIPIDEMIA: Chronic | ICD-10-CM

## 2020-08-10 DIAGNOSIS — G47.33 OBSTRUCTIVE SLEEP APNEA SYNDROME: Chronic | ICD-10-CM

## 2020-08-10 DIAGNOSIS — G47.9 SLEEP DISORDER: Chronic | ICD-10-CM

## 2020-08-10 DIAGNOSIS — E11.319 TYPE 2 DIABETES MELLITUS WITH BOTH EYES AFFECTED BY RETINOPATHY WITHOUT MACULAR EDEMA, WITHOUT LONG-TERM CURRENT USE OF INSULIN, UNSPECIFIED RETINOPATHY SEVERITY (HCC): Chronic | ICD-10-CM

## 2020-08-10 DIAGNOSIS — I10 ESSENTIAL HYPERTENSION: Chronic | ICD-10-CM

## 2020-08-10 DIAGNOSIS — E55.9 VITAMIN D DEFICIENCY: Chronic | ICD-10-CM

## 2020-08-10 DIAGNOSIS — E11.319 DIABETIC RETINOPATHY OF BOTH EYES WITHOUT MACULAR EDEMA ASSOCIATED WITH TYPE 2 DIABETES MELLITUS, UNSPECIFIED RETINOPATHY SEVERITY (HCC): Chronic | ICD-10-CM

## 2020-08-10 DIAGNOSIS — I25.10 CORONARY ARTERIOSCLEROSIS: Chronic | ICD-10-CM

## 2020-08-10 DIAGNOSIS — E78.1 HYPERTRIGLYCERIDEMIA: Chronic | ICD-10-CM

## 2020-08-10 DIAGNOSIS — E11.22 TYPE 2 DIABETES MELLITUS WITH STAGE 3 CHRONIC KIDNEY DISEASE, WITHOUT LONG-TERM CURRENT USE OF INSULIN (HCC): Primary | ICD-10-CM

## 2020-08-10 DIAGNOSIS — K21.9 GASTROESOPHAGEAL REFLUX DISEASE, ESOPHAGITIS PRESENCE NOT SPECIFIED: Chronic | ICD-10-CM

## 2020-08-10 PROCEDURE — 99443 PR PHYS/QHP TELEPHONE EVALUATION 21-30 MIN: CPT | Performed by: INTERNAL MEDICINE

## 2020-08-10 NOTE — PATIENT INSTRUCTIONS
Exercising to Lose Weight  Exercise is structured, repetitive physical activity to improve fitness and health. Getting regular exercise is important for everyone. It is especially important if you are overweight. Being overweight increases your risk of heart disease, stroke, diabetes, high blood pressure, and several types of cancer. Reducing your calorie intake and exercising can help you lose weight.  Exercise is usually categorized as moderate or vigorous intensity. To lose weight, most people need to do a certain amount of moderate-intensity or vigorous-intensity exercise each week.  Moderate-intensity exercise    Moderate-intensity exercise is any activity that gets you moving enough to burn at least three times more energy (calories) than if you were sitting.  Examples of moderate exercise include:  · Walking a mile in 15 minutes.  · Doing light yard work.  · Biking at an easy pace.  Most people should get at least 150 minutes (2 hours and 30 minutes) a week of moderate-intensity exercise to maintain their body weight.  Vigorous-intensity exercise  Vigorous-intensity exercise is any activity that gets you moving enough to burn at least six times more calories than if you were sitting. When you exercise at this intensity, you should be working hard enough that you are not able to carry on a conversation.  Examples of vigorous exercise include:  · Running.  · Playing a team sport, such as football, basketball, and soccer.  · Jumping rope.  Most people should get at least 75 minutes (1 hour and 15 minutes) a week of vigorous-intensity exercise to maintain their body weight.  How can exercise affect me?  When you exercise enough to burn more calories than you eat, you lose weight. Exercise also reduces body fat and builds muscle. The more muscle you have, the more calories you burn. Exercise also:  · Improves mood.  · Reduces stress and tension.  · Improves your overall fitness, flexibility, and  endurance.  · Increases bone strength.  The amount of exercise you need to lose weight depends on:  · Your age.  · The type of exercise.  · Any health conditions you have.  · Your overall physical ability.  Talk to your health care provider about how much exercise you need and what types of activities are safe for you.  What actions can I take to lose weight?  Nutrition    · Make changes to your diet as told by your health care provider or diet and nutrition specialist (dietitian). This may include:  ? Eating fewer calories.  ? Eating more protein.  ? Eating less unhealthy fats.  ? Eating a diet that includes fresh fruits and vegetables, whole grains, low-fat dairy products, and lean protein.  ? Avoiding foods with added fat, salt, and sugar.  · Drink plenty of water while you exercise to prevent dehydration or heat stroke.  Activity  · Choose an activity that you enjoy and set realistic goals. Your health care provider can help you make an exercise plan that works for you.  · Exercise at a moderate or vigorous intensity most days of the week.  ? The intensity of exercise may vary from person to person. You can tell how intense a workout is for you by paying attention to your breathing and heartbeat. Most people will notice their breathing and heartbeat get faster with more intense exercise.  · Do resistance training twice each week, such as:  ? Push-ups.  ? Sit-ups.  ? Lifting weights.  ? Using resistance bands.  · Getting short amounts of exercise can be just as helpful as long structured periods of exercise. If you have trouble finding time to exercise, try to include exercise in your daily routine.  ? Get up, stretch, and walk around every 30 minutes throughout the day.  ? Go for a walk during your lunch break.  ? Park your car farther away from your destination.  ? If you take public transportation, get off one stop early and walk the rest of the way.  ? Make phone calls while standing up and walking  around.  ? Take the stairs instead of elevators or escalators.  · Wear comfortable clothes and shoes with good support.  · Do not exercise so much that you hurt yourself, feel dizzy, or get very short of breath.  Where to find more information  · [place] Department of Health and Human Services: www.hhs.gov  · Centers for Disease Control and Prevention (CDC): www.cdc.gov  Contact a health care provider:  · Before starting a new exercise program.  · If you have questions or concerns about your weight.  · If you have a medical problem that keeps you from exercising.  Get help right away if you have any of the following while exercising:  · Injury.  · Dizziness.  · Difficulty breathing or shortness of breath that does not go away when you stop exercising.  · Chest pain.  · Rapid heartbeat.  Summary  · Being overweight increases your risk of heart disease, stroke, diabetes, high blood pressure, and several types of cancer.  · Losing weight happens when you burn more calories than you eat.  · Reducing the amount of calories you eat in addition to getting regular moderate or vigorous exercise each week helps you lose weight.  This information is not intended to replace advice given to you by your health care provider. Make sure you discuss any questions you have with your health care provider.  Document Released: 01/20/2012 Document Revised: 12/31/2018 Document Reviewed: 12/31/2018  [employer] Patient Education © 2020 [employer]      Calorie Counting for Weight Loss  Calories are units of energy. Your body needs a certain amount of calories from food to keep you going throughout the day. When you eat more calories than your body needs, your body stores the extra calories as fat. When you eat fewer calories than your body needs, your body burns fat to get the energy it needs.  Calorie counting means keeping track of how many calories you eat and drink each day. Calorie counting can be helpful if you need to lose weight. If  you make sure to eat fewer calories than your body needs, you should lose weight. Ask your health care provider what a healthy weight is for you.  For calorie counting to work, you will need to eat the right number of calories in a day in order to lose a healthy amount of weight per week. A dietitian can help you determine how many calories you need in a day and will give you suggestions on how to reach your calorie goal.  · A healthy amount of weight to lose per week is usually 1-2 lb (0.5-0.9 kg). This usually means that your daily calorie intake should be reduced by 500-750 calories.  · Eating 1,200 - 1,500 calories per day can help most women lose weight.  · Eating 1,500 - 1,800 calories per day can help most men lose weight.  What is my plan?  My goal is to have __________ calories per day.  If I have this many calories per day, I should lose around __________ pounds per week.  What do I need to know about calorie counting?  In order to meet your daily calorie goal, you will need to:  · Find out how many calories are in each food you would like to eat. Try to do this before you eat.  · Decide how much of the food you plan to eat.  · Write down what you ate and how many calories it had. Doing this is called keeping a food log.  To successfully lose weight, it is important to balance calorie counting with a healthy lifestyle that includes regular activity. Aim for 150 minutes of moderate exercise (such as walking) or 75 minutes of vigorous exercise (such as running) each week.  Where do I find calorie information?    The number of calories in a food can be found on a Nutrition Facts label. If a food does not have a Nutrition Facts label, try to look up the calories online or ask your dietitian for help.  Remember that calories are listed per serving. If you choose to have more than one serving of a food, you will have to multiply the calories per serving by the amount of servings you plan to eat. For example, the  "label on a package of bread might say that a serving size is 1 slice and that there are 90 calories in a serving. If you eat 1 slice, you will have eaten 90 calories. If you eat 2 slices, you will have eaten 180 calories.  How do I keep a food log?  Immediately after each meal, record the following information in your food log:  · What you ate. Don't forget to include toppings, sauces, and other extras on the food.  · How much you ate. This can be measured in cups, ounces, or number of items.  · How many calories each food and drink had.  · The total number of calories in the meal.  Keep your food log near you, such as in a small notebook in your pocket, or use a mobile kelby or website. Some programs will calculate calories for you and show you how many calories you have left for the day to meet your goal.  What are some calorie counting tips?    · Use your calories on foods and drinks that will fill you up and not leave you hungry:  ? Some examples of foods that fill you up are nuts and nut butters, vegetables, lean proteins, and high-fiber foods like whole grains. High-fiber foods are foods with more than 5 g fiber per serving.  ? Drinks such as sodas, specialty coffee drinks, alcohol, and juices have a lot of calories, yet do not fill you up.  · Eat nutritious foods and avoid empty calories. Empty calories are calories you get from foods or beverages that do not have many vitamins or protein, such as candy, sweets, and soda. It is better to have a nutritious high-calorie food (such as an avocado) than a food with few nutrients (such as a bag of chips).  · Know how many calories are in the foods you eat most often. This will help you calculate calorie counts faster.  · Pay attention to calories in drinks. Low-calorie drinks include water and unsweetened drinks.  · Pay attention to nutrition labels for \"low fat\" or \"fat free\" foods. These foods sometimes have the same amount of calories or more calories than the " full fat versions. They also often have added sugar, starch, or salt, to make up for flavor that was removed with the fat.  · Find a way of tracking calories that works for you. Get creative. Try different apps or programs if writing down calories does not work for you.  What are some portion control tips?  · Know how many calories are in a serving. This will help you know how many servings of a certain food you can have.  · Use a measuring cup to measure serving sizes. You could also try weighing out portions on a kitchen scale. With time, you will be able to estimate serving sizes for some foods.  · Take some time to put servings of different foods on your favorite plates, bowls, and cups so you know what a serving looks like.  · Try not to eat straight from a bag or box. Doing this can lead to overeating. Put the amount you would like to eat in a cup or on a plate to make sure you are eating the right portion.  · Use smaller plates, glasses, and bowls to prevent overeating.  · Try not to multitask (for example, watch TV or use your computer) while eating. If it is time to eat, sit down at a table and enjoy your food. This will help you to know when you are full. It will also help you to be aware of what you are eating and how much you are eating.  What are tips for following this plan?  Reading food labels  · Check the calorie count compared to the serving size. The serving size may be smaller than what you are used to eating.  · Check the source of the calories. Make sure the food you are eating is high in vitamins and protein and low in saturated and trans fats.  Shopping  · Read nutrition labels while you shop. This will help you make healthy decisions before you decide to purchase your food.  · Make a grocery list and stick to it.  Cooking  · Try to cook your favorite foods in a healthier way. For example, try baking instead of frying.  · Use low-fat dairy products.  Meal planning  · Use more fruits and  "vegetables. Half of your plate should be fruits and vegetables.  · Include lean proteins like poultry and fish.  How do I count calories when eating out?  · Ask for smaller portion sizes.  · Consider sharing an entree and sides instead of getting your own entree.  · If you get your own entree, eat only half. Ask for a box at the beginning of your meal and put the rest of your entree in it so you are not tempted to eat it.  · If calories are listed on the menu, choose the lower calorie options.  · Choose dishes that include vegetables, fruits, whole grains, low-fat dairy products, and lean protein.  · Choose items that are boiled, broiled, grilled, or steamed. Stay away from items that are buttered, battered, fried, or served with cream sauce. Items labeled \"crispy\" are usually fried, unless stated otherwise.  · Choose water, low-fat milk, unsweetened iced tea, or other drinks without added sugar. If you want an alcoholic beverage, choose a lower calorie option such as a glass of wine or light beer.  · Ask for dressings, sauces, and syrups on the side. These are usually high in calories, so you should limit the amount you eat.  · If you want a salad, choose a garden salad and ask for grilled meats. Avoid extra toppings like baum, cheese, or fried items. Ask for the dressing on the side, or ask for olive oil and vinegar or lemon to use as dressing.  · Estimate how many servings of a food you are given. For example, a serving of cooked rice is ½ cup or about the size of half a baseball. Knowing serving sizes will help you be aware of how much food you are eating at restaurants. The list below tells you how big or small some common portion sizes are based on everyday objects:  ? 1 oz--4 stacked dice.  ? 3 oz--1 deck of cards.  ? 1 tsp--1 die.  ? 1 Tbsp--½ a ping-pong ball.  ? 2 Tbsp--1 ping-pong ball.  ? ½ cup--½ baseball.  ? 1 cup--1 baseball.  Summary  · Calorie counting means keeping track of how many calories you " eat and drink each day. If you eat fewer calories than your body needs, you should lose weight.  · A healthy amount of weight to lose per week is usually 1-2 lb (0.5-0.9 kg). This usually means reducing your daily calorie intake by 500-750 calories.  · The number of calories in a food can be found on a Nutrition Facts label. If a food does not have a Nutrition Facts label, try to look up the calories online or ask your dietitian for help.  · Use your calories on foods and drinks that will fill you up, and not on foods and drinks that will leave you hungry.  · Use smaller plates, glasses, and bowls to prevent overeating.  This information is not intended to replace advice given to you by your health care provider. Make sure you discuss any questions you have with your health care provider.  Document Released: 12/18/2006 Document Revised: 09/06/2019 Document Reviewed: 11/17/2017  [employer] Patient Education © 2020 [employer]      Exercising to Lose Weight  Exercise is structured, repetitive physical activity to improve fitness and health. Getting regular exercise is important for everyone. It is especially important if you are overweight. Being overweight increases your risk of heart disease, stroke, diabetes, high blood pressure, and several types of cancer. Reducing your calorie intake and exercising can help you lose weight.  Exercise is usually categorized as moderate or vigorous intensity. To lose weight, most people need to do a certain amount of moderate-intensity or vigorous-intensity exercise each week.  Moderate-intensity exercise    Moderate-intensity exercise is any activity that gets you moving enough to burn at least three times more energy (calories) than if you were sitting.  Examples of moderate exercise include:  · Walking a mile in 15 minutes.  · Doing light yard work.  · Biking at an easy pace.  Most people should get at least 150 minutes (2 hours and 30 minutes) a week of moderate-intensity  exercise to maintain their body weight.  Vigorous-intensity exercise  Vigorous-intensity exercise is any activity that gets you moving enough to burn at least six times more calories than if you were sitting. When you exercise at this intensity, you should be working hard enough that you are not able to carry on a conversation.  Examples of vigorous exercise include:  · Running.  · Playing a team sport, such as football, basketball, and soccer.  · Jumping rope.  Most people should get at least 75 minutes (1 hour and 15 minutes) a week of vigorous-intensity exercise to maintain their body weight.  How can exercise affect me?  When you exercise enough to burn more calories than you eat, you lose weight. Exercise also reduces body fat and builds muscle. The more muscle you have, the more calories you burn. Exercise also:  · Improves mood.  · Reduces stress and tension.  · Improves your overall fitness, flexibility, and endurance.  · Increases bone strength.  The amount of exercise you need to lose weight depends on:  · Your age.  · The type of exercise.  · Any health conditions you have.  · Your overall physical ability.  Talk to your health care provider about how much exercise you need and what types of activities are safe for you.  What actions can I take to lose weight?  Nutrition    · Make changes to your diet as told by your health care provider or diet and nutrition specialist (dietitian). This may include:  ? Eating fewer calories.  ? Eating more protein.  ? Eating less unhealthy fats.  ? Eating a diet that includes fresh fruits and vegetables, whole grains, low-fat dairy products, and lean protein.  ? Avoiding foods with added fat, salt, and sugar.  · Drink plenty of water while you exercise to prevent dehydration or heat stroke.  Activity  · Choose an activity that you enjoy and set realistic goals. Your health care provider can help you make an exercise plan that works for you.  · Exercise at a moderate or  vigorous intensity most days of the week.  ? The intensity of exercise may vary from person to person. You can tell how intense a workout is for you by paying attention to your breathing and heartbeat. Most people will notice their breathing and heartbeat get faster with more intense exercise.  · Do resistance training twice each week, such as:  ? Push-ups.  ? Sit-ups.  ? Lifting weights.  ? Using resistance bands.  · Getting short amounts of exercise can be just as helpful as long structured periods of exercise. If you have trouble finding time to exercise, try to include exercise in your daily routine.  ? Get up, stretch, and walk around every 30 minutes throughout the day.  ? Go for a walk during your lunch break.  ? Park your car farther away from your destination.  ? If you take public transportation, get off one stop early and walk the rest of the way.  ? Make phone calls while standing up and walking around.  ? Take the stairs instead of elevators or escalators.  · Wear comfortable clothes and shoes with good support.  · Do not exercise so much that you hurt yourself, feel dizzy, or get very short of breath.  Where to find more information  · [place] Department of Health and Human Services: www.hhs.gov  · Centers for Disease Control and Prevention (CDC): www.cdc.gov  Contact a health care provider:  · Before starting a new exercise program.  · If you have questions or concerns about your weight.  · If you have a medical problem that keeps you from exercising.  Get help right away if you have any of the following while exercising:  · Injury.  · Dizziness.  · Difficulty breathing or shortness of breath that does not go away when you stop exercising.  · Chest pain.  · Rapid heartbeat.  Summary  · Being overweight increases your risk of heart disease, stroke, diabetes, high blood pressure, and several types of cancer.  · Losing weight happens when you burn more calories than you eat.  · Reducing the amount of  calories you eat in addition to getting regular moderate or vigorous exercise each week helps you lose weight.  This information is not intended to replace advice given to you by your health care provider. Make sure you discuss any questions you have with your health care provider.  Document Released: 01/20/2012 Document Revised: 12/31/2018 Document Reviewed: 12/31/2018  [employer] Patient Education © 2020 [employer]      Calorie Counting for Weight Loss  Calories are units of energy. Your body needs a certain amount of calories from food to keep you going throughout the day. When you eat more calories than your body needs, your body stores the extra calories as fat. When you eat fewer calories than your body needs, your body burns fat to get the energy it needs.  Calorie counting means keeping track of how many calories you eat and drink each day. Calorie counting can be helpful if you need to lose weight. If you make sure to eat fewer calories than your body needs, you should lose weight. Ask your health care provider what a healthy weight is for you.  For calorie counting to work, you will need to eat the right number of calories in a day in order to lose a healthy amount of weight per week. A dietitian can help you determine how many calories you need in a day and will give you suggestions on how to reach your calorie goal.  · A healthy amount of weight to lose per week is usually 1-2 lb (0.5-0.9 kg). This usually means that your daily calorie intake should be reduced by 500-750 calories.  · Eating 1,200 - 1,500 calories per day can help most women lose weight.  · Eating 1,500 - 1,800 calories per day can help most men lose weight.  What is my plan?  My goal is to have __________ calories per day.  If I have this many calories per day, I should lose around __________ pounds per week.  What do I need to know about calorie counting?  In order to meet your daily calorie goal, you will need to:  · Find out how many  calories are in each food you would like to eat. Try to do this before you eat.  · Decide how much of the food you plan to eat.  · Write down what you ate and how many calories it had. Doing this is called keeping a food log.  To successfully lose weight, it is important to balance calorie counting with a healthy lifestyle that includes regular activity. Aim for 150 minutes of moderate exercise (such as walking) or 75 minutes of vigorous exercise (such as running) each week.  Where do I find calorie information?    The number of calories in a food can be found on a Nutrition Facts label. If a food does not have a Nutrition Facts label, try to look up the calories online or ask your dietitian for help.  Remember that calories are listed per serving. If you choose to have more than one serving of a food, you will have to multiply the calories per serving by the amount of servings you plan to eat. For example, the label on a package of bread might say that a serving size is 1 slice and that there are 90 calories in a serving. If you eat 1 slice, you will have eaten 90 calories. If you eat 2 slices, you will have eaten 180 calories.  How do I keep a food log?  Immediately after each meal, record the following information in your food log:  · What you ate. Don't forget to include toppings, sauces, and other extras on the food.  · How much you ate. This can be measured in cups, ounces, or number of items.  · How many calories each food and drink had.  · The total number of calories in the meal.  Keep your food log near you, such as in a small notebook in your pocket, or use a mobile kelby or website. Some programs will calculate calories for you and show you how many calories you have left for the day to meet your goal.  What are some calorie counting tips?    · Use your calories on foods and drinks that will fill you up and not leave you hungry:  ? Some examples of foods that fill you up are nuts and nut butters,  "vegetables, lean proteins, and high-fiber foods like whole grains. High-fiber foods are foods with more than 5 g fiber per serving.  ? Drinks such as sodas, specialty coffee drinks, alcohol, and juices have a lot of calories, yet do not fill you up.  · Eat nutritious foods and avoid empty calories. Empty calories are calories you get from foods or beverages that do not have many vitamins or protein, such as candy, sweets, and soda. It is better to have a nutritious high-calorie food (such as an avocado) than a food with few nutrients (such as a bag of chips).  · Know how many calories are in the foods you eat most often. This will help you calculate calorie counts faster.  · Pay attention to calories in drinks. Low-calorie drinks include water and unsweetened drinks.  · Pay attention to nutrition labels for \"low fat\" or \"fat free\" foods. These foods sometimes have the same amount of calories or more calories than the full fat versions. They also often have added sugar, starch, or salt, to make up for flavor that was removed with the fat.  · Find a way of tracking calories that works for you. Get creative. Try different apps or programs if writing down calories does not work for you.  What are some portion control tips?  · Know how many calories are in a serving. This will help you know how many servings of a certain food you can have.  · Use a measuring cup to measure serving sizes. You could also try weighing out portions on a kitchen scale. With time, you will be able to estimate serving sizes for some foods.  · Take some time to put servings of different foods on your favorite plates, bowls, and cups so you know what a serving looks like.  · Try not to eat straight from a bag or box. Doing this can lead to overeating. Put the amount you would like to eat in a cup or on a plate to make sure you are eating the right portion.  · Use smaller plates, glasses, and bowls to prevent overeating.  · Try not to multitask " "(for example, watch TV or use your computer) while eating. If it is time to eat, sit down at a table and enjoy your food. This will help you to know when you are full. It will also help you to be aware of what you are eating and how much you are eating.  What are tips for following this plan?  Reading food labels  · Check the calorie count compared to the serving size. The serving size may be smaller than what you are used to eating.  · Check the source of the calories. Make sure the food you are eating is high in vitamins and protein and low in saturated and trans fats.  Shopping  · Read nutrition labels while you shop. This will help you make healthy decisions before you decide to purchase your food.  · Make a grocery list and stick to it.  Cooking  · Try to cook your favorite foods in a healthier way. For example, try baking instead of frying.  · Use low-fat dairy products.  Meal planning  · Use more fruits and vegetables. Half of your plate should be fruits and vegetables.  · Include lean proteins like poultry and fish.  How do I count calories when eating out?  · Ask for smaller portion sizes.  · Consider sharing an entree and sides instead of getting your own entree.  · If you get your own entree, eat only half. Ask for a box at the beginning of your meal and put the rest of your entree in it so you are not tempted to eat it.  · If calories are listed on the menu, choose the lower calorie options.  · Choose dishes that include vegetables, fruits, whole grains, low-fat dairy products, and lean protein.  · Choose items that are boiled, broiled, grilled, or steamed. Stay away from items that are buttered, battered, fried, or served with cream sauce. Items labeled \"crispy\" are usually fried, unless stated otherwise.  · Choose water, low-fat milk, unsweetened iced tea, or other drinks without added sugar. If you want an alcoholic beverage, choose a lower calorie option such as a glass of wine or light beer.  · Ask " for dressings, sauces, and syrups on the side. These are usually high in calories, so you should limit the amount you eat.  · If you want a salad, choose a garden salad and ask for grilled meats. Avoid extra toppings like baum, cheese, or fried items. Ask for the dressing on the side, or ask for olive oil and vinegar or lemon to use as dressing.  · Estimate how many servings of a food you are given. For example, a serving of cooked rice is ½ cup or about the size of half a baseball. Knowing serving sizes will help you be aware of how much food you are eating at restaurants. The list below tells you how big or small some common portion sizes are based on everyday objects:  ? 1 oz--4 stacked dice.  ? 3 oz--1 deck of cards.  ? 1 tsp--1 die.  ? 1 Tbsp--½ a ping-pong ball.  ? 2 Tbsp--1 ping-pong ball.  ? ½ cup--½ baseball.  ? 1 cup--1 baseball.  Summary  · Calorie counting means keeping track of how many calories you eat and drink each day. If you eat fewer calories than your body needs, you should lose weight.  · A healthy amount of weight to lose per week is usually 1-2 lb (0.5-0.9 kg). This usually means reducing your daily calorie intake by 500-750 calories.  · The number of calories in a food can be found on a Nutrition Facts label. If a food does not have a Nutrition Facts label, try to look up the calories online or ask your dietitian for help.  · Use your calories on foods and drinks that will fill you up, and not on foods and drinks that will leave you hungry.  · Use smaller plates, glasses, and bowls to prevent overeating.  This information is not intended to replace advice given to you by your health care provider. Make sure you discuss any questions you have with your health care provider.  Document Released: 12/18/2006 Document Revised: 09/06/2019 Document Reviewed: 11/17/2017  Elsevier Patient Education © 2020 [employer]

## 2020-08-10 NOTE — PROGRESS NOTES
Subjective      You have chosen to receive care through a telephone visit. Do you consent to use a telephone visit for your medical care today? Yes    Total visit time: 21 minutes.         History of Present Illness     Eleno Savage is a 80 y.o. male who receives care via telephone for 6-month follow up on type 2 diabetes mellitus, stage 2 chronic kidney disease, obesity, COPD, GERD, vitamin D deficiency, and high cholesterol/high triglycerides. He follows with Dr. Weathers, nephrologist annually for CKD and renal function remains relatively with no recent decline.  Dr. Mesa is his cardiologist. He has a history of prostate cancer in 2001 with no evidence of recurrence.      GERD symptoms adequately managed with Prilosec.  He is sleeping well with melatonin, which helps with mild anxiety associated with the Covid-19 pandemic.  He reports that he is no longer requiring trazodone.  He and his wife remain vigilant in social distancing and precautions to decrease exposure to Covid. He reports stable COPD symptoms.       He continues on Neurontin each evening for diabetic neuropathy and denies significant side effects including excessive daytime sedation.  Patient understands the risks associated with this controlled medication, including tolerance and addiction.      Diabetes management is excellent.  He reports 13-pound weight loss with calorie reduction. As a result of his weight loss, A1c is excellent at 6.1.  His diabetic retinopathy is followed by Dr. Jansen.  His most recent diabetic eye exam as 11/2019.  We will request a copy of office note.      BP at goal.     The patient's relevant past medical, surgical, and social history was reviewed in Epic.   Lab results are reviewed with the patient today.  CBC unremarkable. Fasting glucose 125. With weight loss, renal function improved at 1.34 down from 1.46. Normal liver function. Normal thyroid function.  LDL 65.  Vitamin D at goal. PSA reveals no evidence of  "prostate cancer.       Review of Systems   Constitutional: Negative for chills, fatigue and fever.   HENT: Negative for congestion, ear pain, postnasal drip, sinus pressure and sore throat.    Respiratory: Negative for cough, shortness of breath and wheezing.    Cardiovascular: Negative for chest pain, palpitations and leg swelling.   Gastrointestinal: Negative for abdominal pain, blood in stool, constipation, diarrhea, nausea and vomiting.   Endocrine: Negative for cold intolerance, heat intolerance, polydipsia and polyuria.   Genitourinary: Negative for dysuria, frequency, hematuria and urgency.   Skin: Negative for rash.   Neurological: Negative for syncope and weakness.        Objective     Visit Vitals  /56   Pulse 67   Temp 99.3 °F (37.4 °C) (Oral)   Ht 172.7 cm (68\")   Wt 117 kg (259 lb)   SpO2 96%   BMI 39.38 kg/m²     Physical Exam        Assessment/Plan      Continue diet control for diabetes, improved with weight loss.  We will request a copy of diabetic eye exam from Dr. Jansen and update patient's electronic record.        Renal function is improved.  Continue to avoid NSAIDs and other nephrotoxic drugs.  Continue to follow with his nephrologist, Dr. Weathers, annually.    Continue Crestor.  Pursue additional weight loss.      Continue with current use of nebulizer treatments and inhalers to manage his COPD.    Congratulated on weight loss and encouraged to intensify efforts.       Continue other current medications and vitamin/mineral supplements to treat the other medical issues we addressed today.  Return in six months for follow up with fasting labs one week prior.        Scribed for Dr. Israel by Lacy Bustos [facility].     Diagnoses and all orders for this visit:    Type 2 diabetes mellitus with stage 3 chronic kidney disease, without long-term current use of insulin (CMS/[facility])  -     CBC Auto Differential; Future  -     Comprehensive Metabolic Panel; Future  -     Hemoglobin A1c; Future    Type " 2 diabetes mellitus with both eyes affected by retinopathy without macular edema, without long-term current use of insulin, unspecified retinopathy severity (CMS/[facility])  -     CBC Auto Differential; Future  -     Comprehensive Metabolic Panel; Future  -     Hemoglobin A1c; Future    Coronary arteriosclerosis - followed by Dr. Farooq    Essential hypertension  -     Comprehensive Metabolic Panel; Future    Mixed hyperlipidemia  -     Lipid Panel; Future    Hypertriglyceridemia  -     Lipid Panel; Future    Centrilobular emphysema (CMS/HCC)    Obstructive sleep apnea syndrome - on CPAP    Gastroesophageal reflux disease, esophagitis presence not specified    Class 3 severe obesity due to excess calories with serious comorbidity and body mass index (BMI) of 40.0 to 44.9 in adult (CMS/[facility])    Vitamin D deficiency  -     Vitamin D 25 Hydroxy; Future    Diabetic retinopathy of both eyes without macular edema associated with type 2 diabetes mellitus, unspecified retinopathy severity (CMS/HCC)    Chronic kidney disease, stage 3 (CMS/HCC)  -     Comprehensive Metabolic Panel; Future    Malignant tumor of prostate (CMS/HCC) - h/o 2001    Gout due to renal impairment, unspecified chronicity, unspecified site  -     Uric Acid; Future    Hyperuricemia  -     Uric Acid; Future    Sleep disorder        Lab on 08/03/2020   Component Date Value Ref Range Status   • WBC 08/03/2020 8.50  3.40 - 10.80 10*3/mm3 Final   • RBC 08/03/2020 4.65  4.14 - 5.80 10*6/mm3 Final   • Hemoglobin 08/03/2020 14.3  13.0 - 17.7 g/dL Final   • Hematocrit 08/03/2020 42.3  37.5 - 51.0 % Final   • MCV 08/03/2020 91.0  79.0 - 97.0 fL Final   • MCH 08/03/2020 30.8  26.6 - 33.0 pg Final   • MCHC 08/03/2020 33.8  31.5 - 35.7 g/dL Final   • RDW 08/03/2020 14.6  12.3 - 15.4 % Final   • RDW-SD 08/03/2020 47.7  37.0 - 54.0 fl Final   • MPV 08/03/2020 11.6  6.0 - 12.0 fL Final   • Platelets 08/03/2020 186  140 - 450 10*3/mm3 Final   • Neutrophil % 08/03/2020 63.0   42.7 - 76.0 % Final   • Lymphocyte % 08/03/2020 25.1  19.6 - 45.3 % Final   • Monocyte % 08/03/2020 8.1  5.0 - 12.0 % Final   • Eosinophil % 08/03/2020 3.6  0.3 - 6.2 % Final   • Basophil % 08/03/2020 0.2  0.0 - 1.5 % Final   • Neutrophils, Absolute 08/03/2020 5.35  1.70 - 7.00 10*3/mm3 Final   • Lymphocytes, Absolute 08/03/2020 2.13  0.70 - 3.10 10*3/mm3 Final   • Monocytes, Absolute 08/03/2020 0.69  0.10 - 0.90 10*3/mm3 Final   • Eosinophils, Absolute 08/03/2020 0.31  0.00 - 0.40 10*3/mm3 Final   • Basophils, Absolute 08/03/2020 0.02  0.00 - 0.20 10*3/mm3 Final   • Hemoglobin A1C 08/03/2020 6.10* 4.80 - 5.60 % Final   • LDL Cholesterol  08/03/2020 65  0 - 100 mg/dL Final   • Microalbumin/Creatinine Ratio 08/03/2020    Final    Unable to calculate   • Creatinine, Urine 08/03/2020 49.6  mg/dL Final   • Microalbumin, Urine 08/03/2020 <1.2  mg/dL Final   • PSA 08/03/2020 0.098  0.000 - 4.000 ng/mL Final   • TSH 08/03/2020 1.800  0.270 - 4.200 uIU/mL Final   • 25 Hydroxy, Vitamin D 08/03/2020 47.2  30.0 - 100.0 ng/ml Final   • Uric Acid 08/03/2020 6.6  3.5 - 8.5 mg/dL Final   • Glucose 08/03/2020 125* 70 - 99 mg/dL Final   • BUN 08/03/2020 21  7 - 23 mg/dL Final   • Creatinine 08/03/2020 1.34* 0.70 - 1.30 mg/dL Final   • Sodium 08/03/2020 141  137 - 145 mmol/L Final   • Potassium 08/03/2020 4.4  3.4 - 5.0 mmol/L Final   • Chloride 08/03/2020 105  101 - 112 mmol/L Final   • CO2 08/03/2020 28.0  22.0 - 30.0 mmol/L Final   • Calcium 08/03/2020 9.8  8.4 - 10.2 mg/dL Final   • Total Protein 08/03/2020 7.1  6.3 - 8.6 g/dL Final   • Albumin 08/03/2020 4.10  3.50 - 5.00 g/dL Final   • ALT (SGPT) 08/03/2020 17  <=50 U/L Final   • AST (SGOT) 08/03/2020 30  17 - 59 U/L Final   • Alkaline Phosphatase 08/03/2020 107  38 - 126 U/L Final   • Total Bilirubin 08/03/2020 0.1* 0.2 - 1.3 mg/dL Final   • eGFR Non African Amer 08/03/2020 51  42 - 98 mL/min/1.73 Final   • Globulin 08/03/2020 3.0  2.3 - 3.5 gm/dL Final   • A/G Ratio  08/03/2020 1.4  1.1 - 1.8 g/dL Final   • BUN/Creatinine Ratio 08/03/2020 15.7  7.0 - 25.0 Final   • Anion Gap 08/03/2020 8.0  5.0 - 15.0 mmol/L Final   ]

## 2020-08-19 RX ORDER — BUSPIRONE HYDROCHLORIDE 10 MG/1
TABLET ORAL
Qty: 180 TABLET | Refills: 3 | Status: SHIPPED | OUTPATIENT
Start: 2020-08-19 | End: 2021-07-30

## 2020-08-24 ENCOUNTER — OFFICE VISIT (OUTPATIENT)
Dept: FAMILY MEDICINE CLINIC | Facility: CLINIC | Age: 80
End: 2020-08-24

## 2020-08-24 VITALS — HEIGHT: 68 IN | BODY MASS INDEX: 39.25 KG/M2 | WEIGHT: 259 LBS

## 2020-08-24 DIAGNOSIS — J30.1 SEASONAL ALLERGIC RHINITIS DUE TO POLLEN: ICD-10-CM

## 2020-08-24 DIAGNOSIS — Z00.00 MEDICARE ANNUAL WELLNESS VISIT, INITIAL: Primary | ICD-10-CM

## 2020-08-24 DIAGNOSIS — Z23 NEED FOR SHINGLES VACCINE: ICD-10-CM

## 2020-08-24 PROCEDURE — 96160 PT-FOCUSED HLTH RISK ASSMT: CPT | Performed by: INTERNAL MEDICINE

## 2020-08-24 PROCEDURE — G0438 PPPS, INITIAL VISIT: HCPCS | Performed by: INTERNAL MEDICINE

## 2020-08-24 NOTE — PROGRESS NOTES
The ABCs of the Annual Wellness Visit  Initial Medicare Wellness Visit    Chief Complaint   Patient presents with   • Medicare Wellness-Initial Visit       Subjective   History of Present Illness:  Eleno Savage is a 80 y.o. male who presents for an Initial Medicare Wellness Visit.  You have chosen to receive care through a telephone visit. Do you consent to use a telephone visit for your medical care today? Yes    HEALTH RISK ASSESSMENT    Recent Hospitalizations:  No hospitalization(s) within the last year.    Current Medical Providers:  Patient Care Team:  Fabian Israel MD as PCP - General    Smoking Status:  Social History     Tobacco Use   Smoking Status Former Smoker   • Packs/day: 2.00   • Years: 41.00   • Pack years: 82.00   • Types: Cigarettes   • Last attempt to quit: 1985   • Years since quittin.6   Smokeless Tobacco Never Used       Alcohol Consumption:  Social History     Substance and Sexual Activity   Alcohol Use No       Depression Screen:   PHQ-2/PHQ-9 Depression Screening 2020   Little interest or pleasure in doing things 0   Feeling down, depressed, or hopeless 0   Total Score 0       Fall Risk Screen:  EV Fall Risk Assessment was completed, and patient is at LOW risk for falls.Assessment completed on:2020    Health Habits and Functional and Cognitive Screening:  Functional & Cognitive Status 2020   Do you have difficulty preparing food and eating? No   Do you have difficulty bathing yourself, getting dressed or grooming yourself? No   Do you have difficulty using the toilet? No   Do you have difficulty moving around from place to place? Yes   Do you have trouble with steps or getting out of a bed or a chair? Yes   Current Diet Limited Junk Food   Dental Exam Not up to date   Eye Exam Up to date   Exercise (times per week) 4 times per week   Current Exercise Activities Include Yard Work   Do you need help using the phone?  No   Are you deaf or do you have serious  difficulty hearing?  Yes   Do you need help with transportation? No   Do you need help shopping? No   Do you need help preparing meals?  No   Do you need help with housework?  No   Do you need help with laundry? No   Do you need help managing money? No   Do you ever drive or ride in a car without wearing a seat belt? No   Have you felt unusual stress, anger or loneliness in the last month? No   Who do you live with? Spouse   If you need help, do you have trouble finding someone available to you? No   Have you been bothered in the last four weeks by sexual problems? No   Do you have difficulty concentrating, remembering or making decisions? No         Does the patient have evidence of cognitive impairment? No    Asprin use counseling:Taking ASA appropriately as indicated    Age-appropriate Screening Schedule:  Refer to the list below for future screening recommendations based on patient's age, sex and/or medical conditions. Orders for these recommended tests are listed in the plan section. The patient has been provided with a written plan.    Health Maintenance   Topic Date Due   • ZOSTER VACCINE (1 of 2) 01/18/1988   • INFLUENZA VACCINE  08/01/2020   • DIABETIC EYE EXAM  10/31/2020   • HEMOGLOBIN A1C  02/03/2021   • LIPID PANEL  08/03/2021   • URINE MICROALBUMIN  08/03/2021   • TDAP/TD VACCINES (2 - Td) 07/30/2029          The following portions of the patient's history were reviewed and updated as appropriate:   He  has a past medical history of Achilles tendinitis, Acquired hallux rigidus, Acute otitis media, left, After cataract not obscuring vision, After-cataract with vision obscured, Allergic rhinitis, Artificial lens present, Backache, Benign prostatic hyperplasia, Bilateral pseudophakia, Chronic kidney disease, stage 3 (CMS/HCC), Chronic obstructive lung disease (CMS/HCC), Contracture of ankle and foot joint, Coronary arteriosclerosis, Degeneration of intervertebral disc of lumbosacral region, Degenerative  joint disease involving multiple joints, Diabetic retinopathy (CMS/[facility]), Diverticular disease of colon, Encounter for screening for malignant neoplasm of prostate (01/03/2014), Essential hypertension, Exostosis, Fracture of coccyx (CMS/[facility]), Generalized anxiety disorder, GERD (gastroesophageal reflux disease), Gout, Hearing loss, Hemorrhoids, Hyperlipidemia, Hypertriglyceridemia, Hyperuricemia, Insomnia, Lumbar radiculopathy, Malignant tumor of prostate (CMS/[facility]), Non-organic sleep disorder, Obesity, Obstructive sleep apnea syndrome, Primary localized osteoarthrosis, ankle and foot, Radiculopathy, lumbar region, Rupture of Achilles tendon, Skin sensation disturbance, Sleep disorder (7/30/2019), Type 2 diabetes mellitus (CMS/[facility]), Type 2 diabetes mellitus with both eyes affected by retinopathy without macular edema, without long-term current use of insulin (CMS/[facility]) (8/10/2020), Type 2 diabetes mellitus with stage 2 chronic kidney disease, without long-term current use of insulin (CMS/HCC), and Vitamin D deficiency.  He does not have any pertinent problems on file.  He  has a past surgical history that includes Eye capsulotomy (Right, 04/05/2012); Carpal tunnel release; Cholecystectomy (2007); Coronary stent placement (2005); Esophagogastroduodenoscopy w/ PEG (08/28/2012); Transurethral resection of prostate (2001); Cataract extraction w/ intraocular lens implant (01/10/2012); Other surgical history (08/08/2013); and Injection of Medication (05/04/2013).  His family history includes Hypertension in his father and mother.  He  reports that he quit smoking about 35 years ago. His smoking use included cigarettes. He has a 82.00 pack-year smoking history. He has never used smokeless tobacco. He reports that he does not drink alcohol or use drugs.  Current Outpatient Medications   Medication Sig Dispense Refill   • Acetaminophen (TYLENOL EXTRA STRENGTH PO) Take  by mouth as needed.     • albuterol (PROVENTIL) (2.5 MG/3ML)  0.083% nebulizer solution Take 2.5 mg by nebulization every 4 (four) hours as needed.     • albuterol sulfate  (90 Base) MCG/ACT inhaler Inhale 2 puffs Every 4 (Four) Hours As Needed for Wheezing. 1 inhaler 5   • allopurinol (ZYLOPRIM) 100 MG tablet TAKE 1 TABLET BY MOUTH DAILY. FOR HIGH URIC ACID 30 tablet 11   • amLODIPine-benazepril (LOTREL 5-20) 5-20 MG per capsule TAKE 2 CAPSULES DAILY 180 capsule 3   • aspirin 325 MG tablet Take 325 mg by mouth daily.     • busPIRone (BUSPAR) 10 MG tablet TAKE ONE TAB TWICE DAILY 180 tablet 3   • Calcium Carbonate Antacid (TUMS ULTRA PO) Take  by mouth.     • carvedilol (COREG) 3.125 MG tablet 1 TAB(S) BY MOUTH DAILY **HUMANA** 60 tablet 11   • cetirizine (zyrTEC) 10 MG tablet TAKE 1 TABLET BY MOUTH DAILY AS NEEDED FOR ALLERGIES. FOR ALLERGIES 30 tablet 11   • CloNIDine (CATAPRES) 0.1 MG tablet Take 1 tablet as needed for systolic blood pressure greater than 140. May repeat in 4 hours if needed. Maximum of 2 doses daily. 60 tablet 6   • clopidogrel (PLAVIX) 75 MG tablet TAKE 1 TABLET BY MOUTH DAILY. 90 tablet 3   • Coenzyme Q10 100 MG tablet 1-2 tablets daily     • Ergocalciferol (VITAMIN D2 PO) Take 1,000 Units by mouth every other day.     • fluticasone (FLONASE) 50 MCG/ACT nasal spray 1 SPRAY INTO EACH NOSTRIL 2 (TWO) TIMES A DAY FOR 30 DAYS. ADMINISTER 2 SPRAYS IN EACH NOSTRIL FOR EACH DOSE. 16 g 5   • fluticasone-salmeterol (ADVAIR) 250-50 MCG/DOSE DISKUS INHALE 1 PUFF 2 (TWO) TIMES A DAY. 180 each 3   • gabapentin (NEURONTIN) 600 MG tablet 1 TABLET EVERY NIGHT 30 tablet 1   • isosorbide mononitrate (IMDUR) 30 MG 24 hr tablet TAKE 1 TABLET BY MOUTH DAILY. 90 tablet 3   • nitroglycerin (NITROSTAT) 0.4 MG SL tablet 1 TABLET(S) SUBLINGUAL AS NEEDED FOR CHEST PAIN (MAY REPEAT EVERY 5 MINUTES BUT SEEK MEDICAL HELP IF PAIN PERSISTS AFTER 3 TABLETS) 25 tablet 11   • omeprazole (priLOSEC) 40 MG capsule TAKE ONE CAPSULE TWICE DAILY 60 capsule 11   • rosuvastatin (CRESTOR)  20 MG tablet TAKE 1 TABLET BY MOUTH EVERY OTHER DAY. REPLACES LIPITOR 45 tablet 5   • tamsulosin (FLOMAX) 0.4 MG capsule 24 hr capsule TAKE 1 CAPSULE BY MOUTH EVERY NIGHT. 90 capsule 3   • tiotropium (SPIRIVA HANDIHALER) 18 MCG per inhalation capsule Place 1 capsule into inhaler and inhale 1 (one) time daily.     • tolterodine LA (DETROL LA) 4 MG 24 hr capsule TAKE 1 CAPSULE BY MOUTH EVERY NIGHT. 90 capsule 3   • triamterene-hydrochlorothiazide (MAXZIDE-25) 37.5-25 MG per tablet TAKE 1 TABLET BY MOUTH DAILY. 90 tablet 3   • Zoster Vac Recomb Adjuvanted 50 MCG/0.5ML reconstituted suspension Inject 0.5 mL into the appropriate muscle as directed by prescriber Every 2 (Two) Months. Repeat second/final dose after first dose 1 each 1     No current facility-administered medications for this visit.      Current Outpatient Medications on File Prior to Visit   Medication Sig   • Acetaminophen (TYLENOL EXTRA STRENGTH PO) Take  by mouth as needed.   • albuterol (PROVENTIL) (2.5 MG/3ML) 0.083% nebulizer solution Take 2.5 mg by nebulization every 4 (four) hours as needed.   • albuterol sulfate  (90 Base) MCG/ACT inhaler Inhale 2 puffs Every 4 (Four) Hours As Needed for Wheezing.   • allopurinol (ZYLOPRIM) 100 MG tablet TAKE 1 TABLET BY MOUTH DAILY. FOR HIGH URIC ACID   • amLODIPine-benazepril (LOTREL 5-20) 5-20 MG per capsule TAKE 2 CAPSULES DAILY   • aspirin 325 MG tablet Take 325 mg by mouth daily.   • busPIRone (BUSPAR) 10 MG tablet TAKE ONE TAB TWICE DAILY   • Calcium Carbonate Antacid (TUMS ULTRA PO) Take  by mouth.   • carvedilol (COREG) 3.125 MG tablet 1 TAB(S) BY MOUTH DAILY **HUMANA**   • cetirizine (zyrTEC) 10 MG tablet TAKE 1 TABLET BY MOUTH DAILY AS NEEDED FOR ALLERGIES. FOR ALLERGIES   • CloNIDine (CATAPRES) 0.1 MG tablet Take 1 tablet as needed for systolic blood pressure greater than 140. May repeat in 4 hours if needed. Maximum of 2 doses daily.   • clopidogrel (PLAVIX) 75 MG tablet TAKE 1 TABLET BY MOUTH  DAILY.   • Coenzyme Q10 100 MG tablet 1-2 tablets daily   • Ergocalciferol (VITAMIN D2 PO) Take 1,000 Units by mouth every other day.   • fluticasone (FLONASE) 50 MCG/ACT nasal spray 1 SPRAY INTO EACH NOSTRIL 2 (TWO) TIMES A DAY FOR 30 DAYS. ADMINISTER 2 SPRAYS IN EACH NOSTRIL FOR EACH DOSE.   • fluticasone-salmeterol (ADVAIR) 250-50 MCG/DOSE DISKUS INHALE 1 PUFF 2 (TWO) TIMES A DAY.   • gabapentin (NEURONTIN) 600 MG tablet 1 TABLET EVERY NIGHT   • isosorbide mononitrate (IMDUR) 30 MG 24 hr tablet TAKE 1 TABLET BY MOUTH DAILY.   • nitroglycerin (NITROSTAT) 0.4 MG SL tablet 1 TABLET(S) SUBLINGUAL AS NEEDED FOR CHEST PAIN (MAY REPEAT EVERY 5 MINUTES BUT SEEK MEDICAL HELP IF PAIN PERSISTS AFTER 3 TABLETS)   • omeprazole (priLOSEC) 40 MG capsule TAKE ONE CAPSULE TWICE DAILY   • rosuvastatin (CRESTOR) 20 MG tablet TAKE 1 TABLET BY MOUTH EVERY OTHER DAY. REPLACES LIPITOR   • tamsulosin (FLOMAX) 0.4 MG capsule 24 hr capsule TAKE 1 CAPSULE BY MOUTH EVERY NIGHT.   • tiotropium (SPIRIVA HANDIHALER) 18 MCG per inhalation capsule Place 1 capsule into inhaler and inhale 1 (one) time daily.   • tolterodine LA (DETROL LA) 4 MG 24 hr capsule TAKE 1 CAPSULE BY MOUTH EVERY NIGHT.   • triamterene-hydrochlorothiazide (MAXZIDE-25) 37.5-25 MG per tablet TAKE 1 TABLET BY MOUTH DAILY.     No current facility-administered medications on file prior to visit.      He is allergic to aminoglycosides; atorvastatin; bextra [valdecoxib]; celebrex [celecoxib]; contrast dye; indocin [indomethacin]; mobic [meloxicam]; motrin [ibuprofen]; niaspan [niacin er]; nsaids; and vioxx [rofecoxib]..    Outpatient Medications Prior to Visit   Medication Sig Dispense Refill   • Acetaminophen (TYLENOL EXTRA STRENGTH PO) Take  by mouth as needed.     • albuterol (PROVENTIL) (2.5 MG/3ML) 0.083% nebulizer solution Take 2.5 mg by nebulization every 4 (four) hours as needed.     • albuterol sulfate  (90 Base) MCG/ACT inhaler Inhale 2 puffs Every 4 (Four) Hours  As Needed for Wheezing. 1 inhaler 5   • allopurinol (ZYLOPRIM) 100 MG tablet TAKE 1 TABLET BY MOUTH DAILY. FOR HIGH URIC ACID 30 tablet 11   • amLODIPine-benazepril (LOTREL 5-20) 5-20 MG per capsule TAKE 2 CAPSULES DAILY 180 capsule 3   • aspirin 325 MG tablet Take 325 mg by mouth daily.     • busPIRone (BUSPAR) 10 MG tablet TAKE ONE TAB TWICE DAILY 180 tablet 3   • Calcium Carbonate Antacid (TUMS ULTRA PO) Take  by mouth.     • carvedilol (COREG) 3.125 MG tablet 1 TAB(S) BY MOUTH DAILY **HUMANA** 60 tablet 11   • cetirizine (zyrTEC) 10 MG tablet TAKE 1 TABLET BY MOUTH DAILY AS NEEDED FOR ALLERGIES. FOR ALLERGIES 30 tablet 11   • CloNIDine (CATAPRES) 0.1 MG tablet Take 1 tablet as needed for systolic blood pressure greater than 140. May repeat in 4 hours if needed. Maximum of 2 doses daily. 60 tablet 6   • clopidogrel (PLAVIX) 75 MG tablet TAKE 1 TABLET BY MOUTH DAILY. 90 tablet 3   • Coenzyme Q10 100 MG tablet 1-2 tablets daily     • Ergocalciferol (VITAMIN D2 PO) Take 1,000 Units by mouth every other day.     • fluticasone (FLONASE) 50 MCG/ACT nasal spray 1 SPRAY INTO EACH NOSTRIL 2 (TWO) TIMES A DAY FOR 30 DAYS. ADMINISTER 2 SPRAYS IN EACH NOSTRIL FOR EACH DOSE. 16 g 5   • fluticasone-salmeterol (ADVAIR) 250-50 MCG/DOSE DISKUS INHALE 1 PUFF 2 (TWO) TIMES A DAY. 180 each 3   • gabapentin (NEURONTIN) 600 MG tablet 1 TABLET EVERY NIGHT 30 tablet 1   • isosorbide mononitrate (IMDUR) 30 MG 24 hr tablet TAKE 1 TABLET BY MOUTH DAILY. 90 tablet 3   • nitroglycerin (NITROSTAT) 0.4 MG SL tablet 1 TABLET(S) SUBLINGUAL AS NEEDED FOR CHEST PAIN (MAY REPEAT EVERY 5 MINUTES BUT SEEK MEDICAL HELP IF PAIN PERSISTS AFTER 3 TABLETS) 25 tablet 11   • omeprazole (priLOSEC) 40 MG capsule TAKE ONE CAPSULE TWICE DAILY 60 capsule 11   • rosuvastatin (CRESTOR) 20 MG tablet TAKE 1 TABLET BY MOUTH EVERY OTHER DAY. REPLACES LIPITOR 45 tablet 5   • tamsulosin (FLOMAX) 0.4 MG capsule 24 hr capsule TAKE 1 CAPSULE BY MOUTH EVERY NIGHT. 90  capsule 3   • tiotropium (SPIRIVA HANDIHALER) 18 MCG per inhalation capsule Place 1 capsule into inhaler and inhale 1 (one) time daily.     • tolterodine LA (DETROL LA) 4 MG 24 hr capsule TAKE 1 CAPSULE BY MOUTH EVERY NIGHT. 90 capsule 3   • triamterene-hydrochlorothiazide (MAXZIDE-25) 37.5-25 MG per tablet TAKE 1 TABLET BY MOUTH DAILY. 90 tablet 3     No facility-administered medications prior to visit.        Patient Active Problem List   Diagnosis   • Pseudophakia   • Vitamin D deficiency   • Type 2 diabetes mellitus with stage 3 chronic kidney disease, without long-term current use of insulin (CMS/[facility])   • Skin sensation disturbance   • Rupture of Achilles tendon   • Radiculopathy, lumbar region   • Primary localized osteoarthrosis, ankle and foot   • Obstructive sleep apnea syndrome - on CPAP   • Obesity   • Non-organic sleep disorder   • Malignant tumor of prostate (CMS/[facility]) - h/o 2001   • Lumbar radiculopathy   • Insomnia   • Hyperuricemia   • Hypertriglyceridemia   • Hyperlipidemia   • Hemorrhoids   • Hearing loss   • Gout   • GERD (gastroesophageal reflux disease)   • Generalized anxiety disorder   • Fracture of coccyx (CMS/[facility])   • Exostosis   • Essential hypertension   • Encounter for screening for malignant neoplasm of prostate   • Diverticular disease of colon   • Diabetic retinopathy (CMS/[facility]) - Dr. Jansen   • Degenerative joint disease involving multiple joints   • Degeneration of intervertebral disc of lumbosacral region   • Coronary arteriosclerosis - followed by Dr. Farooq   • Contracture of ankle and foot joint   • Chronic obstructive lung disease (CMS/[facility])   • Chronic kidney disease, stage 3 (CMS/[facility])   • Bilateral pseudophakia   • Benign prostatic hyperplasia   • Backache   • Allergic rhinitis   • Sleep disorder -using melatonin, no longer needs trazodone.   • Type 2 diabetes mellitus with both eyes affected by retinopathy without macular edema, without long-term current use of insulin (CMS/[facility])  "      Advanced Care Planning:  ACP discussion was held with the patient during this visit. Patient does not have an advance directive, information provided.    Review of Systems    Compared to one year ago, the patient feels his physical health is the same.  Compared to one year ago, the patient feels his mental health is the same.    Reviewed chart for potential of high risk medication in the elderly: yes  Reviewed chart for potential of harmful drug interactions in the elderly:yes    Objective         Vitals:    08/24/20 0951   Weight: 117 kg (259 lb)   Height: 172.7 cm (68\")   PainSc:   5   PainLoc: Foot  Comment: bilateral       Body mass index is 39.38 kg/m².  Discussed the patient's BMI with him. The BMI is above average; BMI management plan is completed.    Physical Exam    Lab Results   Component Value Date    LDL 65 08/03/2020    HGBA1C 6.10 (H) 08/03/2020        Assessment/Plan   Medicare Risks and Personalized Health Plan  CMS Preventative Services Quick Reference  Advance Directive Discussion  Immunizations Discussed/Encouraged (specific immunizations; Influenza and Shingrix )  Obesity/Overweight     The above risks/problems have been discussed with the patient.  The patient wants to receive Shingrix vaccine.  A prescription is sent to his pharmacy.  He will get influenza vaccine next month.  Pertinent information has been shared with the patient in the After Visit Summary.  Follow up plans and orders are seen below in the Assessment/Plan Section.    He is to resume Flonase nasal spray twice daily for seasonal allergies.  He feels that Zyrtec is not as effective as it used to be.  I suggested he try switching to Allegra 180 mg daily.    Diagnoses and all orders for this visit:    1. Medicare annual wellness visit, initial (Primary)    2. Seasonal allergic rhinitis due to pollen    3. Need for shingles vaccine    Other orders  -     Zoster Vac Recomb Adjuvanted 50 MCG/0.5ML reconstituted suspension; " Inject 0.5 mL into the appropriate muscle as directed by prescriber Every 2 (Two) Months. Repeat second/final dose after first dose  Dispense: 1 each; Refill: 1      Follow Up:  Return in about 1 year (around 8/24/2021) for Medicare Wellness.     An After Visit Summary and PPPS were given to the patient.

## 2020-08-24 NOTE — PATIENT INSTRUCTIONS
Medicare Wellness  Personal Prevention Plan of Service     Date of Office Visit:  2020  Encounter Provider:  Fabian Israel MD  Place of Service:  [facility]  Patient Name: Eleno Savage  :  1940    As part of the Medicare Wellness portion of your visit today, we are providing you with this personalized preventive plan of services (PPPS). This plan is based upon recommendations of the [place] Preventive Services Task Force (USPSTF) and the Advisory Committee on Immunization Practices (ACIP).    This lists the preventive care services that should be considered, and provides dates of when you are due. Items listed as completed are up-to-date and do not require any further intervention.    Health Maintenance   Topic Date Due   • ZOSTER VACCINE (1 of 2) 1988   • MEDICARE ANNUAL WELLNESS  2016   • INFLUENZA VACCINE  2020   • DIABETIC EYE EXAM  10/31/2020   • HEMOGLOBIN A1C  2021   • LIPID PANEL  2021   • URINE MICROALBUMIN  2021   • TDAP/TD VACCINES (2 - Td) 2029   • Pneumococcal Vaccine Once at 65 Years Old  Completed       No orders of the defined types were placed in this encounter.      Return in about 1 year (around 2021) for Medicare Wellness.          Exercising to Lose Weight  Exercise is structured, repetitive physical activity to improve fitness and health. Getting regular exercise is important for everyone. It is especially important if you are overweight. Being overweight increases your risk of heart disease, stroke, diabetes, high blood pressure, and several types of cancer. Reducing your calorie intake and exercising can help you lose weight.  Exercise is usually categorized as moderate or vigorous intensity. To lose weight, most people need to do a certain amount of moderate-intensity or vigorous-intensity exercise each week.  Moderate-intensity exercise    Moderate-intensity exercise is any  activity that gets you moving enough to burn at least three times more energy (calories) than if you were sitting.  Examples of moderate exercise include:  · Walking a mile in 15 minutes.  · Doing light yard work.  · Biking at an easy pace.  Most people should get at least 150 minutes (2 hours and 30 minutes) a week of moderate-intensity exercise to maintain their body weight.  Vigorous-intensity exercise  Vigorous-intensity exercise is any activity that gets you moving enough to burn at least six times more calories than if you were sitting. When you exercise at this intensity, you should be working hard enough that you are not able to carry on a conversation.  Examples of vigorous exercise include:  · Running.  · Playing a team sport, such as football, basketball, and soccer.  · Jumping rope.  Most people should get at least 75 minutes (1 hour and 15 minutes) a week of vigorous-intensity exercise to maintain their body weight.  How can exercise affect me?  When you exercise enough to burn more calories than you eat, you lose weight. Exercise also reduces body fat and builds muscle. The more muscle you have, the more calories you burn. Exercise also:  · Improves mood.  · Reduces stress and tension.  · Improves your overall fitness, flexibility, and endurance.  · Increases bone strength.  The amount of exercise you need to lose weight depends on:  · Your age.  · The type of exercise.  · Any health conditions you have.  · Your overall physical ability.  Talk to your health care provider about how much exercise you need and what types of activities are safe for you.  What actions can I take to lose weight?  Nutrition    · Make changes to your diet as told by your health care provider or diet and nutrition specialist (dietitian). This may include:  ? Eating fewer calories.  ? Eating more protein.  ? Eating less unhealthy fats.  ? Eating a diet that includes fresh fruits and vegetables, whole grains, low-fat dairy  products, and lean protein.  ? Avoiding foods with added fat, salt, and sugar.  · Drink plenty of water while you exercise to prevent dehydration or heat stroke.  Activity  · Choose an activity that you enjoy and set realistic goals. Your health care provider can help you make an exercise plan that works for you.  · Exercise at a moderate or vigorous intensity most days of the week.  ? The intensity of exercise may vary from person to person. You can tell how intense a workout is for you by paying attention to your breathing and heartbeat. Most people will notice their breathing and heartbeat get faster with more intense exercise.  · Do resistance training twice each week, such as:  ? Push-ups.  ? Sit-ups.  ? Lifting weights.  ? Using resistance bands.  · Getting short amounts of exercise can be just as helpful as long structured periods of exercise. If you have trouble finding time to exercise, try to include exercise in your daily routine.  ? Get up, stretch, and walk around every 30 minutes throughout the day.  ? Go for a walk during your lunch break.  ? Park your car farther away from your destination.  ? If you take public transportation, get off one stop early and walk the rest of the way.  ? Make phone calls while standing up and walking around.  ? Take the stairs instead of elevators or escalators.  · Wear comfortable clothes and shoes with good support.  · Do not exercise so much that you hurt yourself, feel dizzy, or get very short of breath.  Where to find more information  · [place] Department of Health and Human Services: www.hhs.gov  · Centers for Disease Control and Prevention (CDC): www.cdc.gov  Contact a health care provider:  · Before starting a new exercise program.  · If you have questions or concerns about your weight.  · If you have a medical problem that keeps you from exercising.  Get help right away if you have any of the following while exercising:  · Injury.  · Dizziness.  · Difficulty  breathing or shortness of breath that does not go away when you stop exercising.  · Chest pain.  · Rapid heartbeat.  Summary  · Being overweight increases your risk of heart disease, stroke, diabetes, high blood pressure, and several types of cancer.  · Losing weight happens when you burn more calories than you eat.  · Reducing the amount of calories you eat in addition to getting regular moderate or vigorous exercise each week helps you lose weight.  This information is not intended to replace advice given to you by your health care provider. Make sure you discuss any questions you have with your health care provider.  Document Released: 01/20/2012 Document Revised: 12/31/2018 Document Reviewed: 12/31/2018  [employer] Patient Education © 2020 [employer]      Calorie Counting for Weight Loss  Calories are units of energy. Your body needs a certain amount of calories from food to keep you going throughout the day. When you eat more calories than your body needs, your body stores the extra calories as fat. When you eat fewer calories than your body needs, your body burns fat to get the energy it needs.  Calorie counting means keeping track of how many calories you eat and drink each day. Calorie counting can be helpful if you need to lose weight. If you make sure to eat fewer calories than your body needs, you should lose weight. Ask your health care provider what a healthy weight is for you.  For calorie counting to work, you will need to eat the right number of calories in a day in order to lose a healthy amount of weight per week. A dietitian can help you determine how many calories you need in a day and will give you suggestions on how to reach your calorie goal.  · A healthy amount of weight to lose per week is usually 1-2 lb (0.5-0.9 kg). This usually means that your daily calorie intake should be reduced by 500-750 calories.  · Eating 1,200 - 1,500 calories per day can help most women lose weight.  · Eating  1,500 - 1,800 calories per day can help most men lose weight.  What is my plan?  My goal is to have __________ calories per day.  If I have this many calories per day, I should lose around __________ pounds per week.  What do I need to know about calorie counting?  In order to meet your daily calorie goal, you will need to:  · Find out how many calories are in each food you would like to eat. Try to do this before you eat.  · Decide how much of the food you plan to eat.  · Write down what you ate and how many calories it had. Doing this is called keeping a food log.  To successfully lose weight, it is important to balance calorie counting with a healthy lifestyle that includes regular activity. Aim for 150 minutes of moderate exercise (such as walking) or 75 minutes of vigorous exercise (such as running) each week.  Where do I find calorie information?    The number of calories in a food can be found on a Nutrition Facts label. If a food does not have a Nutrition Facts label, try to look up the calories online or ask your dietitian for help.  Remember that calories are listed per serving. If you choose to have more than one serving of a food, you will have to multiply the calories per serving by the amount of servings you plan to eat. For example, the label on a package of bread might say that a serving size is 1 slice and that there are 90 calories in a serving. If you eat 1 slice, you will have eaten 90 calories. If you eat 2 slices, you will have eaten 180 calories.  How do I keep a food log?  Immediately after each meal, record the following information in your food log:  · What you ate. Don't forget to include toppings, sauces, and other extras on the food.  · How much you ate. This can be measured in cups, ounces, or number of items.  · How many calories each food and drink had.  · The total number of calories in the meal.  Keep your food log near you, such as in a small notebook in your pocket, or use a  "mobile kelby or website. Some programs will calculate calories for you and show you how many calories you have left for the day to meet your goal.  What are some calorie counting tips?    · Use your calories on foods and drinks that will fill you up and not leave you hungry:  ? Some examples of foods that fill you up are nuts and nut butters, vegetables, lean proteins, and high-fiber foods like whole grains. High-fiber foods are foods with more than 5 g fiber per serving.  ? Drinks such as sodas, specialty coffee drinks, alcohol, and juices have a lot of calories, yet do not fill you up.  · Eat nutritious foods and avoid empty calories. Empty calories are calories you get from foods or beverages that do not have many vitamins or protein, such as candy, sweets, and soda. It is better to have a nutritious high-calorie food (such as an avocado) than a food with few nutrients (such as a bag of chips).  · Know how many calories are in the foods you eat most often. This will help you calculate calorie counts faster.  · Pay attention to calories in drinks. Low-calorie drinks include water and unsweetened drinks.  · Pay attention to nutrition labels for \"low fat\" or \"fat free\" foods. These foods sometimes have the same amount of calories or more calories than the full fat versions. They also often have added sugar, starch, or salt, to make up for flavor that was removed with the fat.  · Find a way of tracking calories that works for you. Get creative. Try different apps or programs if writing down calories does not work for you.  What are some portion control tips?  · Know how many calories are in a serving. This will help you know how many servings of a certain food you can have.  · Use a measuring cup to measure serving sizes. You could also try weighing out portions on a kitchen scale. With time, you will be able to estimate serving sizes for some foods.  · Take some time to put servings of different foods on your favorite " plates, bowls, and cups so you know what a serving looks like.  · Try not to eat straight from a bag or box. Doing this can lead to overeating. Put the amount you would like to eat in a cup or on a plate to make sure you are eating the right portion.  · Use smaller plates, glasses, and bowls to prevent overeating.  · Try not to multitask (for example, watch TV or use your computer) while eating. If it is time to eat, sit down at a table and enjoy your food. This will help you to know when you are full. It will also help you to be aware of what you are eating and how much you are eating.  What are tips for following this plan?  Reading food labels  · Check the calorie count compared to the serving size. The serving size may be smaller than what you are used to eating.  · Check the source of the calories. Make sure the food you are eating is high in vitamins and protein and low in saturated and trans fats.  Shopping  · Read nutrition labels while you shop. This will help you make healthy decisions before you decide to purchase your food.  · Make a grocery list and stick to it.  Cooking  · Try to cook your favorite foods in a healthier way. For example, try baking instead of frying.  · Use low-fat dairy products.  Meal planning  · Use more fruits and vegetables. Half of your plate should be fruits and vegetables.  · Include lean proteins like poultry and fish.  How do I count calories when eating out?  · Ask for smaller portion sizes.  · Consider sharing an entree and sides instead of getting your own entree.  · If you get your own entree, eat only half. Ask for a box at the beginning of your meal and put the rest of your entree in it so you are not tempted to eat it.  · If calories are listed on the menu, choose the lower calorie options.  · Choose dishes that include vegetables, fruits, whole grains, low-fat dairy products, and lean protein.  · Choose items that are boiled, broiled, grilled, or steamed. Stay away  "from items that are buttered, battered, fried, or served with cream sauce. Items labeled \"crispy\" are usually fried, unless stated otherwise.  · Choose water, low-fat milk, unsweetened iced tea, or other drinks without added sugar. If you want an alcoholic beverage, choose a lower calorie option such as a glass of wine or light beer.  · Ask for dressings, sauces, and syrups on the side. These are usually high in calories, so you should limit the amount you eat.  · If you want a salad, choose a garden salad and ask for grilled meats. Avoid extra toppings like baum, cheese, or fried items. Ask for the dressing on the side, or ask for olive oil and vinegar or lemon to use as dressing.  · Estimate how many servings of a food you are given. For example, a serving of cooked rice is ½ cup or about the size of half a baseball. Knowing serving sizes will help you be aware of how much food you are eating at restaurants. The list below tells you how big or small some common portion sizes are based on everyday objects:  ? 1 oz--4 stacked dice.  ? 3 oz--1 deck of cards.  ? 1 tsp--1 die.  ? 1 Tbsp--½ a ping-pong ball.  ? 2 Tbsp--1 ping-pong ball.  ? ½ cup--½ baseball.  ? 1 cup--1 baseball.  Summary  · Calorie counting means keeping track of how many calories you eat and drink each day. If you eat fewer calories than your body needs, you should lose weight.  · A healthy amount of weight to lose per week is usually 1-2 lb (0.5-0.9 kg). This usually means reducing your daily calorie intake by 500-750 calories.  · The number of calories in a food can be found on a Nutrition Facts label. If a food does not have a Nutrition Facts label, try to look up the calories online or ask your dietitian for help.  · Use your calories on foods and drinks that will fill you up, and not on foods and drinks that will leave you hungry.  · Use smaller plates, glasses, and bowls to prevent overeating.  This information is not intended to replace advice " given to you by your health care provider. Make sure you discuss any questions you have with your health care provider.  Document Released: 12/18/2006 Document Revised: 09/06/2019 Document Reviewed: 11/17/2017  [employer] Patient Education © 2020 [employer]

## 2020-08-31 ENCOUNTER — CLINICAL SUPPORT (OUTPATIENT)
Dept: FAMILY MEDICINE CLINIC | Facility: CLINIC | Age: 80
End: 2020-08-31

## 2020-08-31 DIAGNOSIS — E11.42 DIABETIC PERIPHERAL NEUROPATHY (HCC): ICD-10-CM

## 2020-08-31 DIAGNOSIS — Z23 NEED FOR SHINGLES VACCINE: ICD-10-CM

## 2020-08-31 PROCEDURE — 90471 IMMUNIZATION ADMIN: CPT | Performed by: INTERNAL MEDICINE

## 2020-08-31 RX ORDER — TRAZODONE HYDROCHLORIDE 150 MG/1
TABLET ORAL
Qty: 90 TABLET | Refills: 3 | Status: SHIPPED | OUTPATIENT
Start: 2020-08-31 | End: 2021-06-07

## 2020-08-31 RX ORDER — GABAPENTIN 600 MG/1
TABLET ORAL
Qty: 30 TABLET | Refills: 5 | Status: SHIPPED | OUTPATIENT
Start: 2020-08-31 | End: 2021-02-26 | Stop reason: SDUPTHER

## 2020-08-31 RX ORDER — CLOPIDOGREL BISULFATE 75 MG/1
75 TABLET ORAL DAILY
Qty: 90 TABLET | Refills: 3 | Status: SHIPPED | OUTPATIENT
Start: 2020-08-31 | End: 2021-09-16

## 2020-08-31 RX ORDER — TOLTERODINE 4 MG/1
4 CAPSULE, EXTENDED RELEASE ORAL NIGHTLY
Qty: 90 CAPSULE | Refills: 3 | Status: SHIPPED | OUTPATIENT
Start: 2020-08-31 | End: 2021-07-30

## 2020-09-14 RX ORDER — ALLOPURINOL 100 MG/1
TABLET ORAL
Qty: 30 TABLET | Refills: 11 | Status: SHIPPED | OUTPATIENT
Start: 2020-09-14 | End: 2021-07-30

## 2020-10-01 ENCOUNTER — CLINICAL SUPPORT (OUTPATIENT)
Dept: FAMILY MEDICINE CLINIC | Facility: CLINIC | Age: 80
End: 2020-10-01

## 2020-10-01 DIAGNOSIS — Z23 FLU VACCINE NEED: Primary | ICD-10-CM

## 2020-10-01 PROCEDURE — 90694 VACC AIIV4 NO PRSRV 0.5ML IM: CPT | Performed by: INTERNAL MEDICINE

## 2020-10-01 PROCEDURE — G0008 ADMIN INFLUENZA VIRUS VAC: HCPCS | Performed by: INTERNAL MEDICINE

## 2020-10-05 RX ORDER — ROSUVASTATIN CALCIUM 20 MG/1
20 TABLET, COATED ORAL DAILY
Qty: 90 TABLET | Refills: 0 | Status: SHIPPED | OUTPATIENT
Start: 2020-10-05 | End: 2021-01-04

## 2020-10-05 RX ORDER — AMLODIPINE BESYLATE AND BENAZEPRIL HYDROCHLORIDE 5; 20 MG/1; MG/1
CAPSULE ORAL
Qty: 180 CAPSULE | Refills: 0 | Status: SHIPPED | OUTPATIENT
Start: 2020-10-05 | End: 2021-01-25 | Stop reason: SDUPTHER

## 2020-10-12 RX ORDER — CETIRIZINE HYDROCHLORIDE 10 MG/1
10 TABLET ORAL DAILY PRN
Qty: 30 TABLET | Refills: 5 | Status: SHIPPED | OUTPATIENT
Start: 2020-10-12 | End: 2021-04-12

## 2020-10-12 RX ORDER — OMEPRAZOLE 40 MG/1
CAPSULE, DELAYED RELEASE ORAL
Qty: 60 CAPSULE | Refills: 5 | Status: SHIPPED | OUTPATIENT
Start: 2020-10-12 | End: 2021-04-12

## 2020-11-11 RX ORDER — TRIAMTERENE AND HYDROCHLOROTHIAZIDE 37.5; 25 MG/1; MG/1
TABLET ORAL
Qty: 90 TABLET | Refills: 3 | Status: SHIPPED | OUTPATIENT
Start: 2020-11-11 | End: 2021-07-30

## 2020-11-11 RX ORDER — ALBUTEROL SULFATE 90 UG/1
AEROSOL, METERED RESPIRATORY (INHALATION)
Qty: 18 G | Refills: 6 | Status: SHIPPED | OUTPATIENT
Start: 2020-11-11 | End: 2022-02-09

## 2020-11-19 RX ORDER — ISOSORBIDE MONONITRATE 30 MG/1
30 TABLET, EXTENDED RELEASE ORAL DAILY
Qty: 90 TABLET | Refills: 3 | Status: SHIPPED | OUTPATIENT
Start: 2020-11-19 | End: 2021-10-29

## 2021-01-04 RX ORDER — ROSUVASTATIN CALCIUM 20 MG/1
20 TABLET, COATED ORAL DAILY
Qty: 90 TABLET | Refills: 0 | Status: SHIPPED | OUTPATIENT
Start: 2021-01-04 | End: 2021-01-18 | Stop reason: SDUPTHER

## 2021-01-13 ENCOUNTER — LAB (OUTPATIENT)
Dept: LAB | Facility: OTHER | Age: 81
End: 2021-01-13

## 2021-01-13 ENCOUNTER — OFFICE VISIT (OUTPATIENT)
Dept: FAMILY MEDICINE CLINIC | Facility: CLINIC | Age: 81
End: 2021-01-13

## 2021-01-13 VITALS
HEART RATE: 76 BPM | SYSTOLIC BLOOD PRESSURE: 120 MMHG | DIASTOLIC BLOOD PRESSURE: 60 MMHG | HEIGHT: 68 IN | WEIGHT: 266 LBS | BODY MASS INDEX: 40.32 KG/M2 | TEMPERATURE: 97 F

## 2021-01-13 DIAGNOSIS — E11.22 TYPE 2 DIABETES MELLITUS WITH STAGE 3A CHRONIC KIDNEY DISEASE, WITHOUT LONG-TERM CURRENT USE OF INSULIN (HCC): Chronic | ICD-10-CM

## 2021-01-13 DIAGNOSIS — K62.5 RECTAL BLEEDING: Primary | ICD-10-CM

## 2021-01-13 DIAGNOSIS — K64.4 EXTERNAL HEMORRHOID, BLEEDING: ICD-10-CM

## 2021-01-13 DIAGNOSIS — N18.31 TYPE 2 DIABETES MELLITUS WITH STAGE 3A CHRONIC KIDNEY DISEASE, WITHOUT LONG-TERM CURRENT USE OF INSULIN (HCC): Chronic | ICD-10-CM

## 2021-01-13 DIAGNOSIS — I10 ESSENTIAL HYPERTENSION: Chronic | ICD-10-CM

## 2021-01-13 DIAGNOSIS — K62.5 RECTAL BLEEDING: ICD-10-CM

## 2021-01-13 DIAGNOSIS — K62.5 BRIGHT RED RECTAL BLEEDING: Primary | ICD-10-CM

## 2021-01-13 DIAGNOSIS — I25.10 CORONARY ARTERIOSCLEROSIS: Chronic | ICD-10-CM

## 2021-01-13 PROBLEM — N17.9 ACUTE-ON-CHRONIC RENAL FAILURE (HCC): Status: ACTIVE | Noted: 2021-01-13

## 2021-01-13 PROBLEM — R94.30 ABNORMAL CARDIOVASCULAR FUNCTION STUDY: Status: ACTIVE | Noted: 2018-07-11

## 2021-01-13 PROBLEM — R60.0 EDEMA OF LOWER EXTREMITY: Status: ACTIVE | Noted: 2021-01-13

## 2021-01-13 PROBLEM — N18.9 ACUTE-ON-CHRONIC RENAL FAILURE: Status: ACTIVE | Noted: 2021-01-13

## 2021-01-13 LAB
ALBUMIN SERPL-MCNC: 4 G/DL (ref 3.5–5)
ALBUMIN/GLOB SERPL: 1.3 G/DL (ref 1.1–1.8)
ALP SERPL-CCNC: 105 U/L (ref 38–126)
ALT SERPL W P-5'-P-CCNC: 16 U/L
ANION GAP SERPL CALCULATED.3IONS-SCNC: 9 MMOL/L (ref 5–15)
AST SERPL-CCNC: 19 U/L (ref 17–59)
BASOPHILS # BLD AUTO: 0.03 10*3/MM3 (ref 0–0.2)
BASOPHILS NFR BLD AUTO: 0.3 % (ref 0–1.5)
BILIRUB SERPL-MCNC: 0.4 MG/DL (ref 0.2–1.3)
BUN SERPL-MCNC: 20 MG/DL (ref 7–23)
BUN/CREAT SERPL: 14.1 (ref 7–25)
CALCIUM SPEC-SCNC: 9.3 MG/DL (ref 8.4–10.2)
CHLORIDE SERPL-SCNC: 106 MMOL/L (ref 101–112)
CO2 SERPL-SCNC: 24 MMOL/L (ref 22–30)
CREAT SERPL-MCNC: 1.42 MG/DL (ref 0.7–1.3)
DEPRECATED RDW RBC AUTO: 48.1 FL (ref 37–54)
EOSINOPHIL # BLD AUTO: 0.21 10*3/MM3 (ref 0–0.4)
EOSINOPHIL NFR BLD AUTO: 2.1 % (ref 0.3–6.2)
ERYTHROCYTE [DISTWIDTH] IN BLOOD BY AUTOMATED COUNT: 14.8 % (ref 12.3–15.4)
GFR SERPL CREATININE-BSD FRML MDRD: 48 ML/MIN/1.73 (ref 42–98)
GLOBULIN UR ELPH-MCNC: 3 GM/DL (ref 2.3–3.5)
GLUCOSE SERPL-MCNC: 128 MG/DL (ref 70–99)
HCT VFR BLD AUTO: 41.5 % (ref 37.5–51)
HGB BLD-MCNC: 13.8 G/DL (ref 13–17.7)
LYMPHOCYTES # BLD AUTO: 2.09 10*3/MM3 (ref 0.7–3.1)
LYMPHOCYTES NFR BLD AUTO: 20.5 % (ref 19.6–45.3)
MCH RBC QN AUTO: 30.4 PG (ref 26.6–33)
MCHC RBC AUTO-ENTMCNC: 33.3 G/DL (ref 31.5–35.7)
MCV RBC AUTO: 91.4 FL (ref 79–97)
MONOCYTES # BLD AUTO: 0.73 10*3/MM3 (ref 0.1–0.9)
MONOCYTES NFR BLD AUTO: 7.2 % (ref 5–12)
NEUTROPHILS NFR BLD AUTO: 69.9 % (ref 42.7–76)
NEUTROPHILS NFR BLD AUTO: 7.13 10*3/MM3 (ref 1.7–7)
PLATELET # BLD AUTO: 188 10*3/MM3 (ref 140–450)
PMV BLD AUTO: 11.4 FL (ref 6–12)
POTASSIUM SERPL-SCNC: 4.1 MMOL/L (ref 3.4–5)
PROT SERPL-MCNC: 7 G/DL (ref 6.3–8.6)
RBC # BLD AUTO: 4.54 10*6/MM3 (ref 4.14–5.8)
SODIUM SERPL-SCNC: 139 MMOL/L (ref 137–145)
WBC # BLD AUTO: 10.19 10*3/MM3 (ref 3.4–10.8)

## 2021-01-13 PROCEDURE — 85025 COMPLETE CBC W/AUTO DIFF WBC: CPT | Performed by: INTERNAL MEDICINE

## 2021-01-13 PROCEDURE — 36415 COLL VENOUS BLD VENIPUNCTURE: CPT | Performed by: INTERNAL MEDICINE

## 2021-01-13 PROCEDURE — 80053 COMPREHEN METABOLIC PANEL: CPT | Performed by: INTERNAL MEDICINE

## 2021-01-13 PROCEDURE — 99214 OFFICE O/P EST MOD 30 MIN: CPT | Performed by: INTERNAL MEDICINE

## 2021-01-13 NOTE — PATIENT INSTRUCTIONS

## 2021-01-13 NOTE — PROGRESS NOTES
"Chief Complaint  Rectal Bleeding (bright red and started this morning)    Subjective          Eleno Savage presents to [facility] for evaluation of sudden onset bright red blood per rectum in the setting of obese patient with diabetes, CKD, coronary artery disease, obesity with hemorrhoids on dual antiplatelet agents.  History of Present Illness    Eleno and his wife called this morning reporting that he had experienced sudden onset of bright red blood per rectum.  It is more than just blood on toilet paper.  He is experiencing blood in the toilet bowl and blood in his underwear.  He has external hemorrhoids and is on both aspirin 325 mg and Plavix 75 mg daily.  His last CAD intervention was PTCA and stenting in 2005.  Dr. Farooq is his cardiologist.  He cannot recall any discussion about reducing his aspirin dose.  He does recall that they asked him to take a full adult aspirin at the time of his last coronary stenting in 2005.  He is still having some rectal bleeding when he arrives at the office, but it seems to be slowing significantly.  He denies any recent constipation that required him to strain for a BM.  He denies rectal pain with defecation.  He passed some more bright red blood from his rectum in our bathroom here while waiting to see me.  His hemoglobin today is 13.8, baseline hemoglobin has been around 14.3.  He denies any orthostatic symptoms.  He already took his aspirin and Plavix this morning.     He has stage III chronic kidney disease.  His diabetes has been well controlled.  Last A1c was 6.1.    His blood pressure is well controlled today.  He has been gaining weight this winter.  BMI is 40.5.    Objective   Vital Signs:   /60   Pulse 76   Temp 97 °F (36.1 °C) (Infrared)   Ht 172.7 cm (68\")   Wt 121 kg (266 lb)   BMI 40.45 kg/m²     Physical Exam  Vitals signs reviewed.   Constitutional:       General: He is not in acute distress.     " Appearance: He is well-developed. He is obese. He is not ill-appearing.      Comments: Crowded posterior oropharynx   HENT:      Head: Normocephalic and atraumatic.      Nose:      Right Sinus: No maxillary sinus tenderness or frontal sinus tenderness.      Left Sinus: No maxillary sinus tenderness or frontal sinus tenderness.      Mouth/Throat:      Mouth: No oral lesions.      Pharynx: Uvula midline.      Tonsils: No tonsillar exudate.   Eyes:      Conjunctiva/sclera: Conjunctivae normal.      Pupils: Pupils are equal, round, and reactive to light.   Neck:      Musculoskeletal: Neck supple.      Thyroid: No thyroid mass or thyromegaly.      Vascular: No carotid bruit or JVD.      Trachea: Trachea normal. No tracheal deviation.   Cardiovascular:      Rate and Rhythm: Normal rate and regular rhythm.  No extrasystoles are present.     Chest Wall: PMI is not displaced.      Heart sounds: Normal heart sounds. No murmur.   Pulmonary:      Effort: Pulmonary effort is normal. No accessory muscle usage or respiratory distress.      Breath sounds: Normal breath sounds. No decreased breath sounds, wheezing, rhonchi or rales.   Abdominal:      General: Bowel sounds are normal. There is no distension.      Palpations: Abdomen is soft.      Tenderness: There is no abdominal tenderness. There is no guarding.      Comments: Obese abdomen, but benign   Genitourinary:     Comments: Rectal exam reveals some dried blood staining the skin in the perirectal area, but no active bright red blood.  Digital rectal exam reveals external hemorrhoids and possible internal hemorrhoids, but no mass or fissure appreciated.  No tenderness was reproduced while examining the rectum.  Just a minimal smear of blood that was not fresh is noted on my gloved finger  Lymphadenopathy:      Cervical: No cervical adenopathy.   Skin:     General: Skin is warm and dry.      Findings: No rash.      Nails: There is no clubbing.     Neurological:      Mental  Status: He is alert and oriented to person, place, and time.      Cranial Nerves: No cranial nerve deficit.      Coordination: Coordination normal.   Psychiatric:         Speech: Speech normal.         Behavior: Behavior normal.         Thought Content: Thought content normal.         Judgment: Judgment normal.        Result Review :     CMP    CMP 7/13/20 8/3/20 1/13/21   BUN 22 21 20   Creatinine 1.46 (A) 1.34 (A) 1.42 (A)   eGFR Non  Am 46 51 48   Sodium 141 141 139   Potassium 4.4 4.4 4.1   Chloride 106 105 106   Calcium 9.6 9.8 9.3   Albumin 4.00 4.10 4.00   Total Bilirubin  0.1 (A) 0.4   Alkaline Phosphatase  107 105   AST (SGOT)  30 19   ALT (SGPT)  17 16   (A) Abnormal value            CBC    CBC 1/28/20 8/3/20 1/13/21   WBC 8.59 8.50 10.19   RBC 4.65 4.65 4.54   Hemoglobin 14.1 14.3 13.8   Hematocrit 42.5 42.3 41.5   MCV 91.4 91.0 91.4   MCH 30.3 30.8 30.4   MCHC 33.2 33.8 33.3   RDW 14.9 14.6 14.8   Platelets 198 186 188           Lipid Panel    Lipid Panel 1/28/20 8/3/20   Triglycerides 171 (A)    HDL Cholesterol 25 (A)    VLDL Cholesterol 34.2    LDL Cholesterol  53 65   LDL/HDL Ratio 2.11    (A) Abnormal value            Most Recent A1C    HGBA1C Most Recent 8/3/20   Hemoglobin A1C 6.10 (A)   (A) Abnormal value            Data reviewed: Cardiology studies Dr. Mesa          Assessment and Plan    Problem List Items Addressed This Visit        Cardiac and Vasculature    Coronary arteriosclerosis - followed by Dr. Farooq (Chronic)    Overview     S/P PTCA/stent 2005. Dr. Farooq            Essential hypertension (Chronic)       Endocrine and Metabolic    Type 2 diabetes mellitus with stage 3 chronic kidney disease, without long-term current use of insulin (CMS/[facility]) (Chronic)    Overview     Diet controlled. No metformin due to chronic kidney disease           Other Visit Diagnoses     Bright red rectal bleeding    -  Primary    Relevant Orders    Ambulatory Referral to General Surgery    External  hemorrhoid, bleeding        Relevant Orders    Ambulatory Referral to General Surgery        He seems to be stable at this time, this episode of bleeding seems to be resolving and reassurance was given, but he is at risk for additional bleeding.  Stop the aspirin for now.  Continue Plavix 75 mg daily, but skip the dose tomorrow morning.  Refer to Dr. Erickson, with request that he be seen ASAP.  If bleeding intensifies or he starts experiencing orthostatic symptoms or anginal symptoms, he is encouraged to go to the ER for repeat evaluation.    Continue the current blood pressure management and monitoring.  Hold clonidine for any hypotension or orthostatic symptoms.    Continue his current diabetic management and monitoring.  He will be receiving his repeat labs in a couple weeks with follow-up appointment afterwards.  We continue to emphasize the need for aggressive weight loss.    I spent 35 minutes caring for Eleno on this date of service. This time includes time spent by me in the following activities:preparing for the visit, reviewing tests, performing a medically appropriate examination and/or evaluation , counseling and educating the patient/family/caregiver, ordering medications, tests, or procedures, referring and communicating with other health care professionals , documenting information in the medical record and care coordination  Follow Up   Return if symptoms worsen or fail to improve, for Next scheduled follow up - labs 1 week prior.  Patient was given instructions and counseling regarding his condition or for health maintenance advice. Please see specific information pulled into the AVS if appropriate.

## 2021-01-15 ENCOUNTER — CONSULT (OUTPATIENT)
Dept: SURGERY | Facility: CLINIC | Age: 81
End: 2021-01-15

## 2021-01-15 VITALS
BODY MASS INDEX: 40.01 KG/M2 | WEIGHT: 264 LBS | SYSTOLIC BLOOD PRESSURE: 116 MMHG | HEIGHT: 68 IN | DIASTOLIC BLOOD PRESSURE: 58 MMHG | TEMPERATURE: 97.3 F | HEART RATE: 89 BPM

## 2021-01-15 DIAGNOSIS — K62.5 RECTAL BLEEDING: Primary | ICD-10-CM

## 2021-01-15 PROCEDURE — 99204 OFFICE O/P NEW MOD 45 MIN: CPT | Performed by: SURGERY

## 2021-01-15 RX ORDER — DEXTROSE AND SODIUM CHLORIDE 5; .45 G/100ML; G/100ML
100 INJECTION, SOLUTION INTRAVENOUS CONTINUOUS PRN
Status: CANCELLED | OUTPATIENT
Start: 2021-02-08

## 2021-01-15 NOTE — PROGRESS NOTES
Lopez Savage is a 80 y.o. male     Chief Complaint: Rectal bleeding    History of Present Illness referred by Dr. Israel after patient presented to him a few days ago with a history of bright red blood per rectum since Tuesday.  States that the blood is on the stool and in the toilet bowl as well as on the toilet paper.  No perianal pain.  He saw Dr. Li rectal exam was done in his office.  Hemoglobin was 13.  Patient had a colonoscopy done in 2012.  He is never had any symptoms like this in the past.  He does take Plavix.  He had cardiac stents placed in 2008.  No family history of colon cancer no history of polyps.  No change in his bowel habits otherwise.  No abdominal pain    Review of Systems   HENT: Positive for hearing loss.    Eyes: Negative.    Respiratory: Negative.    Cardiovascular: Negative.    Gastrointestinal: Positive for anal bleeding and constipation.        Heartburn   Endocrine: Negative.    Genitourinary: Negative.    Musculoskeletal: Positive for arthralgias and back pain.        Intermittent   Skin: Negative.    Allergic/Immunologic: Negative.    Neurological: Positive for numbness.        Numbness & Tingling left leg   Hematological: Negative.    Psychiatric/Behavioral: Negative.      Past Medical History:   Diagnosis Date   • Achilles tendinitis    • Acquired hallux rigidus    • Acute otitis media, left    • After cataract not obscuring vision    • After-cataract with vision obscured     left mild      • Allergic rhinitis      On Zyrtec. Resumed her Flonase and added phenylephrine, 11/2015     • Artificial lens present    • Backache    • Benign prostatic hyperplasia      S/P TUR 2001. On Flomax.    • Bilateral pseudophakia    • Chronic kidney disease, stage 3     Baseline creatinine 1.7   • Chronic obstructive lung disease (CMS/HCC)    • Contracture of ankle and foot joint     Equinus   • Coronary arteriosclerosis     S/P PTCA/stent 2005. Dr. Farooq      • Degeneration  of intervertebral disc of lumbosacral region     Added Neurontin and hydrocodone, 5/20   • Degenerative joint disease involving multiple joints    • Diabetic retinopathy (CMS/[facility])    • Diverticular disease of colon    • Encounter for screening for malignant neoplasm of prostate 01/03/2014   • Essential hypertension    • Exostosis    • Fracture of coccyx (CMS/[facility])     sequela - Likely old      • Generalized anxiety disorder    • GERD (gastroesophageal reflux disease)     Omeprazole 40 mg twice a day, 11/2015      • Gout     On allopurinol      • Hearing loss    • Hemorrhoids    • Hyperlipidemia     Patient reports he discontinued Lipitor 2014. Declines other statins      • Hypertriglyceridemia     On TriCor      • Hyperuricemia     Improved with allopurinol      • Insomnia    • Lumbar radiculopathy     Lumbar DJD primarily sensory radiculopathy left lower extremity   • Malignant tumor of prostate (CMS/[facility])     2001,history of. No evidence of recurrence.     • Non-organic sleep disorder     On Lunesta and trazodone      • Obesity    • Obstructive sleep apnea syndrome    • Primary localized osteoarthrosis, ankle and foot    • Radiculopathy, lumbar region    • Rupture of Achilles tendon    • Skin sensation disturbance     numbness & tingling in left leg      • Sleep disorder 7/30/2019   • Type 2 diabetes mellitus (CMS/[facility])     Diet controlled. No metformin due to chronic kidney disease   • Type 2 diabetes mellitus with both eyes affected by retinopathy without macular edema, without long-term current use of insulin (CMS/[facility]) 8/10/2020   • Type 2 diabetes mellitus with stage 2 chronic kidney disease, without long-term current use of insulin (CMS/[facility])     Diet controlled. No metformin due to chronic kidney disease   • Vitamin D deficiency     On vitamin D every other day        Past Surgical History:   Procedure Laterality Date   • CARPAL TUNNEL RELEASE     • CATARACT EXTRACTION W/ INTRAOCULAR LENS IMPLANT  01/10/2012     Cataract extraction with intraocular lens implantation, right eye. Cataract, right eye.   • CHOLECYSTECTOMY     • CORONARY STENT PLACEMENT     • CYSTOSCOPY TRANSURETHRAL RESECTION OF PROSTATE     • ENDOSCOPY W/ PEG TUBE PLACEMENT  2012    Normal hypoph, esoph, duod, symm & patent pylorus. Hiatus hernia in GE junction.Irregularity of z-line compatable w/esophageal reflux.Moderately severe non-erosive gastritis in stomach.Multiple biopsies taken.Large amount of bile present n stomach.   • EYE CAPSULOTOMY WITH LASER Right 2012    YAG   • INJECTION OF MEDICATION  2013    Inj(s) Tend-Sheath, Ligament, Single  (Tendinitis, achilles)  2013 [facility]    • OTHER SURGICAL HISTORY  2013    Small Joint Injection/Aspiration  (Osteoarthrosis, localized, primary, involving ankle and/or foot)      Family History   Problem Relation Age of Onset   • Hypertension Mother    • Hypertension Father      Social History     Socioeconomic History   • Marital status:      Spouse name: Not on file   • Number of children: Not on file   • Years of education: Not on file   • Highest education level: Not on file   Tobacco Use   • Smoking status: Former Smoker     Packs/day: 2.00     Years: 41.00     Pack years: 82.00     Types: Cigarettes     Quit date: 1985     Years since quittin.0   • Smokeless tobacco: Never Used   Substance and Sexual Activity   • Alcohol use: No   • Drug use: No   • Sexual activity: Defer     Allergies   Allergen Reactions   • Aminoglycosides Unknown - Low Severity   • Atorvastatin Myalgia   • Bextra [Valdecoxib] Myalgia   • Celebrex [Celecoxib] Unknown - Low Severity   • Contrast Dye Unknown - Low Severity   • Indocin [Indomethacin] Unknown - Low Severity   • Mobic [Meloxicam] Unknown - Low Severity   • Motrin [Ibuprofen] Unknown - Low Severity   • Niaspan [Niacin Er] Unknown - Low Severity   • Nsaids Unknown - Low Severity   • Vioxx [Rofecoxib]  Unknown - Low Severity     Vitals:    01/15/21 1111   BP: 116/58   Pulse: 89   Temp: 97.3 °F (36.3 °C)       Home Medications:  Prior to Admission medications    Medication Sig Start Date End Date Taking? Authorizing Provider   Acetaminophen (TYLENOL EXTRA STRENGTH PO) Take  by mouth as needed.   Yes Haritha Ace MD   albuterol (PROVENTIL) (2.5 MG/3ML) 0.083% nebulizer solution Take 2.5 mg by nebulization every 4 (four) hours as needed.   Yes Haritha Ace MD   allopurinol (ZYLOPRIM) 100 MG tablet TAKE 1 TABLET BY MOUTH DAILY. FOR HIGH URIC ACID 9/14/20  Yes Fabian Israel MD   amLODIPine-benazepril (LOTREL 5-20) 5-20 MG per capsule TAKE 2 CAPSULES DAILY **HUMANA** *IN BOX 8/28/20* 10/5/20  Yes Emily Robb APRN   aspirin 325 MG tablet Take 325 mg by mouth daily.   Yes Haritha Ace MD   busPIRone (BUSPAR) 10 MG tablet TAKE ONE TAB TWICE DAILY 8/19/20  Yes Fabian Israel MD   Calcium Carbonate Antacid (TUMS ULTRA PO) Take  by mouth.   Yes Haritha Ace MD   carvedilol (COREG) 3.125 MG tablet 1 TAB(S) BY MOUTH DAILY **HUMANA** 7/24/20  Yes Fabian Israel MD   cetirizine (zyrTEC) 10 MG tablet TAKE 1 TABLET BY MOUTH DAILY AS NEEDED FOR ALLERGIES. FOR ALLERGIES 10/12/20  Yes Fabian Israel MD   CloNIDine (CATAPRES) 0.1 MG tablet Take 1 tablet as needed for systolic blood pressure greater than 140. May repeat in 4 hours if needed. Maximum of 2 doses daily. 11/1/18  Yes Fabian Israel MD   clopidogrel (PLAVIX) 75 MG tablet TAKE 1 TABLET BY MOUTH DAILY. 8/31/20  Yes Fabian Israel MD   Coenzyme Q10 100 MG tablet 1-2 tablets daily 9/24/18  Yes Fabian Israel MD   Ergocalciferol (VITAMIN D2 PO) Take 1,000 Units by mouth every other day.   Yes Haritha Ace MD   fluticasone (FLONASE) 50 MCG/ACT nasal spray 1 SPRAY INTO EACH NOSTRIL 2 (TWO) TIMES A DAY FOR 30 DAYS. ADMINISTER 2 SPRAYS IN EACH NOSTRIL FOR EACH DOSE. 2/26/18  Yes Fabian Israel MD   fluticasone-salmeterol (ADVAIR)  250-50 MCG/DOSE DISKUS INHALE 1 PUFF 2 (TWO) TIMES A DAY. 6/30/20  Yes Fabian Israel MD   gabapentin (NEURONTIN) 600 MG tablet 1 TABLET EVERY NIGHT 8/31/20  Yes Fabian Israel MD   isosorbide mononitrate (IMDUR) 30 MG 24 hr tablet TAKE 1 TABLET BY MOUTH DAILY. 11/19/20  Yes Fabian Israel MD   nitroglycerin (NITROSTAT) 0.4 MG SL tablet 1 TABLET(S) SUBLINGUAL AS NEEDED FOR CHEST PAIN (MAY REPEAT EVERY 5 MINUTES BUT SEEK MEDICAL HELP IF PAIN PERSISTS AFTER 3 TABLETS) 1/16/19  Yes Fabian Israel MD   omeprazole (priLOSEC) 40 MG capsule TAKE ONE CAPSULE TWICE DAILY 10/12/20  Yes Fabian Israel MD   rosuvastatin (CRESTOR) 20 MG tablet TAKE 1 TABLET BY MOUTH DAILY.  Patient taking differently: Take 20 mg by mouth Every Other Day. 1/4/21  Yes Fabian Israel MD   tamsulosin (FLOMAX) 0.4 MG capsule 24 hr capsule TAKE 1 CAPSULE BY MOUTH EVERY NIGHT. 7/14/20  Yes Fabian Israel MD   tiotropium (SPIRIVA HANDIHALER) 18 MCG per inhalation capsule Place 1 capsule into inhaler and inhale 1 (one) time daily.   Yes Haritha Ace MD   tolterodine LA (DETROL LA) 4 MG 24 hr capsule TAKE 1 CAPSULE BY MOUTH EVERY NIGHT. 8/31/20  Yes Fabian Israel MD   traZODone (DESYREL) 150 MG tablet TAKE 1/2 TO 1 TABLET EVERY NIGHT 8/31/20  Yes Fabian Israel MD   triamterene-hydrochlorothiazide (MAXZIDE-25) 37.5-25 MG per tablet TAKE 1 TABLET BY MOUTH DAILY. **HUMANA** 11/11/20  Yes Fabian Israel MD   Ventolin  (90 Base) MCG/ACT inhaler USE 2 PUFFS FOUR TIMES A DAY AS NEEDED 11/11/20  Yes Fabian Israel MD   Zoster Vac Recomb Adjuvanted 50 MCG/0.5ML reconstituted suspension Inject 0.5 mL into the appropriate muscle as directed by prescriber Every 2 (Two) Months. Repeat second/final dose after first dose 8/24/20  Yes Fabian Israel MD       Objective   Physical Exam  Constitutional:       General: He is not in acute distress.     Appearance: He is well-developed.   HENT:      Head: Normocephalic and atraumatic.   Eyes:      General: No  scleral icterus.     Conjunctiva/sclera: Conjunctivae normal.   Neck:      Musculoskeletal: Normal range of motion and neck supple.      Thyroid: No thyromegaly.      Trachea: No tracheal deviation.   Cardiovascular:      Rate and Rhythm: Normal rate and regular rhythm.      Heart sounds: Normal heart sounds. No murmur. No friction rub. No gallop.    Pulmonary:      Effort: Pulmonary effort is normal. No respiratory distress.      Breath sounds: Normal breath sounds. No stridor. No wheezing or rales.   Chest:      Chest wall: No tenderness.   Abdominal:      General: Bowel sounds are normal. There is no distension.      Palpations: Abdomen is soft. There is no mass.      Tenderness: There is no abdominal tenderness. There is no guarding or rebound.      Hernia: No hernia is present.   Musculoskeletal: Normal range of motion.         General: No deformity.   Lymphadenopathy:      Cervical: No cervical adenopathy.   Skin:     General: Skin is warm and dry.      Coloration: Skin is not pale.      Findings: No erythema or rash.      Nails: There is no clubbing.     Neurological:      Mental Status: He is alert and oriented to person, place, and time.   Psychiatric:         Behavior: Behavior normal.         Thought Content: Thought content normal.     Perianal area unremarkable.  Does have some small skin tags.  No fluctuance no palpable masses no sinuses or fissures appreciated.  Rectal exam is unremarkable     Assessment/Plan Rectal bleeding.  Patient is on Plavix.  Fully discussed the situation with him.  Would recommend he undergo colonoscopy and he should hold Plavix for 7 days prior to the procedure.  Fully discussed colonoscopy as well as alternatives risk benefits with him he clearly understands and wishes to proceed we also discussed the prep and the importance of the prep and he clearly understands that as well.      The encounter diagnosis was Rectal bleeding.                     This document has been  electronically signed by Will Erickson MD on January 15, 2021 11:34 CST

## 2021-01-15 NOTE — PATIENT INSTRUCTIONS
"BMI for Adults  What is BMI?  Body mass index (BMI) is a number that is calculated from a person's weight and height. BMI can help estimate how much of a person's weight is composed of fat. BMI does not measure body fat directly. Rather, it is an alternative to procedures that directly measure body fat, which can be difficult and expensive.  BMI can help identify people who may be at higher risk for certain medical problems.  What are BMI measurements used for?  BMI is used as a screening tool to identify possible weight problems. It helps determine whether a person is obese, overweight, a healthy weight, or underweight.  BMI is useful for:  · Identifying a weight problem that may be related to a medical condition or may increase the risk for medical problems.  · Promoting changes, such as changes in diet and exercise, to help reach a healthy weight. BMI screening can be repeated to see if these changes are working.  How is BMI calculated?  BMI involves measuring your weight in relation to your height. Both height and weight are measured, and the BMI is calculated from those numbers. This can be done either in English ([place] or metric measurements. Note that charts and online BMI calculators are available to help you find your BMI quickly and easily without having to do these calculations yourself.  To calculate your BMI in English ([place] measurements:    1. Measure your weight in pounds (lb).  2. Multiply the number of pounds by 703.  ? For example, for a person who weighs 180 lb, multiply that number by 703, which equals 126,540.  3. Measure your height in inches. Then multiply that number by itself to get a measurement called \"inches squared.\"  ? For example, for a person who is 70 inches tall, the \"inches squared\" measurement is 70 inches x 70 inches, which equals 4,900 inches squared.  4. Divide the total from step 2 (number of lb x 703) by the total from step 3 (inches squared): 126,540 ÷ 4,900 = 25.8. This is " "your BMI.  To calculate your BMI in metric measurements:  1. Measure your weight in kilograms (kg).  2. Measure your height in meters (m). Then multiply that number by itself to get a measurement called \"meters squared.\"  ? For example, for a person who is 1.75 m tall, the \"meters squared\" measurement is 1.75 m x 1.75 m, which is equal to 3.1 meters squared.  3. Divide the number of kilograms (your weight) by the meters squared number. In this example: 70 ÷ 3.1 = 22.6. This is your BMI.  What do the results mean?  BMI charts are used to identify whether you are underweight, normal weight, overweight, or obese. The following guidelines will be used:  · Underweight: BMI less than 18.5.  · Normal weight: BMI between 18.5 and 24.9.  · Overweight: BMI between 25 and 29.9.  · Obese: BMI of 30 or above.  Keep these notes in mind:  · Weight includes both fat and muscle, so someone with a muscular build, such as an athlete, may have a BMI that is higher than 24.9. In cases like these, BMI is not an accurate measure of body fat.  · To determine if excess body fat is the cause of a BMI of 25 or higher, further assessments may need to be done by a health care provider.  · BMI is usually interpreted in the same way for men and women.  Where to find more information  For more information about BMI, including tools to quickly calculate your BMI, go to these websites:  · Centers for Disease Control and Prevention: www.cdc.gov  · American Heart Association: www.heart.org  · National Heart, Lung, and Blood [place]: www.nhlbi.nih.gov  Summary  · Body mass index (BMI) is a number that is calculated from a person's weight and height.  · BMI may help estimate how much of a person's weight is composed of fat. BMI can help identify those who may be at higher risk for certain medical problems.  · BMI can be measured using English measurements or metric measurements.  · BMI charts are used to identify whether you are underweight, normal " weight, overweight, or obese.  This information is not intended to replace advice given to you by your health care provider. Make sure you discuss any questions you have with your health care provider.  Document Revised: 09/09/2020 Document Reviewed: 07/17/2020  Elsevier Patient Education © 2020 [employer]

## 2021-01-18 RX ORDER — ROSUVASTATIN CALCIUM 20 MG/1
20 TABLET, COATED ORAL EVERY OTHER DAY
Qty: 45 TABLET | Refills: 3 | Status: SHIPPED | OUTPATIENT
Start: 2021-01-18 | End: 2021-12-21

## 2021-01-18 RX ORDER — NITROGLYCERIN 0.4 MG/1
TABLET SUBLINGUAL
Qty: 25 TABLET | Refills: 11 | Status: SHIPPED | OUTPATIENT
Start: 2021-01-18 | End: 2022-01-20

## 2021-01-25 RX ORDER — AMLODIPINE BESYLATE AND BENAZEPRIL HYDROCHLORIDE 5; 20 MG/1; MG/1
CAPSULE ORAL
Qty: 180 CAPSULE | Refills: 1 | Status: SHIPPED | OUTPATIENT
Start: 2021-01-25 | End: 2021-05-17

## 2021-02-05 ENCOUNTER — LAB (OUTPATIENT)
Dept: LAB | Facility: HOSPITAL | Age: 81
End: 2021-02-05

## 2021-02-05 DIAGNOSIS — Z01.818 PREOP TESTING: Primary | ICD-10-CM

## 2021-02-05 LAB — SARS-COV-2 ORF1AB RESP QL NAA+PROBE: NOT DETECTED

## 2021-02-05 PROCEDURE — C9803 HOPD COVID-19 SPEC COLLECT: HCPCS

## 2021-02-05 PROCEDURE — U0004 COV-19 TEST NON-CDC HGH THRU: HCPCS

## 2021-02-08 ENCOUNTER — ANESTHESIA (OUTPATIENT)
Dept: GASTROENTEROLOGY | Facility: HOSPITAL | Age: 81
End: 2021-02-08

## 2021-02-08 ENCOUNTER — ANESTHESIA EVENT (OUTPATIENT)
Dept: GASTROENTEROLOGY | Facility: HOSPITAL | Age: 81
End: 2021-02-08

## 2021-02-08 ENCOUNTER — HOSPITAL ENCOUNTER (OUTPATIENT)
Facility: HOSPITAL | Age: 81
Setting detail: HOSPITAL OUTPATIENT SURGERY
Discharge: HOME | End: 2021-02-08
Attending: SURGERY | Admitting: SURGERY

## 2021-02-08 VITALS
BODY MASS INDEX: 39.4 KG/M2 | RESPIRATION RATE: 16 BRPM | HEART RATE: 82 BPM | DIASTOLIC BLOOD PRESSURE: 64 MMHG | WEIGHT: 260 LBS | HEIGHT: 68 IN | SYSTOLIC BLOOD PRESSURE: 124 MMHG | TEMPERATURE: 98.1 F | OXYGEN SATURATION: 96 %

## 2021-02-08 DIAGNOSIS — K62.5 RECTAL BLEEDING: ICD-10-CM

## 2021-02-08 PROCEDURE — 88305 TISSUE EXAM BY PATHOLOGIST: CPT

## 2021-02-08 PROCEDURE — 25010000002 PROPOFOL 10 MG/ML EMULSION: Performed by: NURSE ANESTHETIST, CERTIFIED REGISTERED

## 2021-02-08 RX ORDER — PROPOFOL 10 MG/ML
VIAL (ML) INTRAVENOUS AS NEEDED
Status: DISCONTINUED | OUTPATIENT
Start: 2021-02-08 | End: 2021-02-08 | Stop reason: SURG

## 2021-02-08 RX ORDER — DEXTROSE AND SODIUM CHLORIDE 5; .45 G/100ML; G/100ML
100 INJECTION, SOLUTION INTRAVENOUS CONTINUOUS PRN
Status: DISCONTINUED | OUTPATIENT
Start: 2021-02-08 | End: 2021-02-08 | Stop reason: HOSPADM

## 2021-02-08 RX ADMIN — PROPOFOL 15 MG: 10 INJECTION, EMULSION INTRAVENOUS at 07:59

## 2021-02-08 RX ADMIN — DEXTROSE AND SODIUM CHLORIDE 100 ML/HR: 5; 450 INJECTION, SOLUTION INTRAVENOUS at 07:21

## 2021-02-08 RX ADMIN — PROPOFOL 20 MG: 10 INJECTION, EMULSION INTRAVENOUS at 07:54

## 2021-02-08 RX ADMIN — PROPOFOL 20 MG: 10 INJECTION, EMULSION INTRAVENOUS at 07:51

## 2021-02-08 RX ADMIN — PROPOFOL 20 MG: 10 INJECTION, EMULSION INTRAVENOUS at 07:49

## 2021-02-08 RX ADMIN — PROPOFOL 15 MG: 10 INJECTION, EMULSION INTRAVENOUS at 08:02

## 2021-02-08 RX ADMIN — PROPOFOL 70 MG: 10 INJECTION, EMULSION INTRAVENOUS at 07:47

## 2021-02-08 RX ADMIN — PROPOFOL 20 MG: 10 INJECTION, EMULSION INTRAVENOUS at 07:55

## 2021-02-08 RX ADMIN — PROPOFOL 20 MG: 10 INJECTION, EMULSION INTRAVENOUS at 07:56

## 2021-02-08 NOTE — ANESTHESIA PREPROCEDURE EVALUATION
Anesthesia Evaluation     NPO Solid Status: > 8 hours  NPO Liquid Status: > 2 hours           Airway   Mallampati: II  TM distance: >3 FB  Neck ROM: full  Possible difficult intubation  Dental    (+) partials    Pulmonary - normal exam   (+) COPD, sleep apnea,   Cardiovascular - normal exam    (+) hypertension, CAD, hyperlipidemia,       Neuro/Psych  (+) numbness, psychiatric history,     GI/Hepatic/Renal/Endo    (+) obesity,  GERD, GI bleeding , renal disease, diabetes mellitus,     Musculoskeletal     Abdominal    Substance History      OB/GYN          Other   arthritis,    history of cancer                    Anesthesia Plan    ASA 3     MAC     intravenous induction     Anesthetic plan, all risks, benefits, and alternatives have been provided, discussed and informed consent has been obtained with: patient.

## 2021-02-08 NOTE — ANESTHESIA POSTPROCEDURE EVALUATION
Patient: Eleno Savage    Procedure Summary     Date: 02/08/21 Room / Location: [facility] ENDOSCOPY 2 / [facility] ENDOSCOPY    Anesthesia Start: 0746 Anesthesia Stop: 0809    Procedure: COLONOSCOPY (N/A ) Diagnosis:       Rectal bleeding      (Rectal bleeding [K62.5])    Surgeon: Will Erickson MD Provider: Stephon Martell CRNA    Anesthesia Type: MAC ASA Status: 3          Anesthesia Type: MAC    Vitals  No vitals data found for the desired time range.          Post Anesthesia Care and Evaluation    Patient location during evaluation: bedside  Patient participation: complete - patient participated  Level of consciousness: awake and alert  Pain management: adequate  Airway patency: patent  Anesthetic complications: No anesthetic complications  PONV Status: none  Cardiovascular status: acceptable  Respiratory status: acceptable  Hydration status: acceptable    Comments: ---------------------------               02/08/21 0809         ---------------------------   BP:          116/67         Pulse:         77          Resp:          18           Temp:   98.4 °F (36.9 °C)   SpO2:          94%         ---------------------------

## 2021-02-08 NOTE — INTERVAL H&P NOTE
H&P reviewed. The patient was examined and there are no changes to the H&P.      Temp:  [98.4 °F (36.9 °C)] 98.4 °F (36.9 °C)  Heart Rate:  [104] 104  Resp:  [18] 18  BP: (161)/(87) 161/87

## 2021-02-09 LAB
LAB AP CASE REPORT: NORMAL
PATH REPORT.FINAL DX SPEC: NORMAL

## 2021-02-15 ENCOUNTER — OFFICE VISIT (OUTPATIENT)
Dept: SURGERY | Facility: CLINIC | Age: 81
End: 2021-02-15

## 2021-02-15 DIAGNOSIS — Z86.010 HX OF ADENOMATOUS COLONIC POLYPS: Primary | ICD-10-CM

## 2021-02-15 DIAGNOSIS — K57.90 DIVERTICULOSIS: ICD-10-CM

## 2021-02-22 ENCOUNTER — LAB (OUTPATIENT)
Dept: LAB | Facility: OTHER | Age: 81
End: 2021-02-22

## 2021-02-22 DIAGNOSIS — N18.30 CHRONIC KIDNEY DISEASE, STAGE 3 (HCC): Chronic | ICD-10-CM

## 2021-02-22 DIAGNOSIS — E11.22 TYPE 2 DIABETES MELLITUS WITH STAGE 3 CHRONIC KIDNEY DISEASE, WITHOUT LONG-TERM CURRENT USE OF INSULIN (HCC): ICD-10-CM

## 2021-02-22 DIAGNOSIS — I10 ESSENTIAL HYPERTENSION: Chronic | ICD-10-CM

## 2021-02-22 DIAGNOSIS — M10.30 GOUT DUE TO RENAL IMPAIRMENT, UNSPECIFIED CHRONICITY, UNSPECIFIED SITE: Chronic | ICD-10-CM

## 2021-02-22 DIAGNOSIS — E78.2 MIXED HYPERLIPIDEMIA: Chronic | ICD-10-CM

## 2021-02-22 DIAGNOSIS — E55.9 VITAMIN D DEFICIENCY: Chronic | ICD-10-CM

## 2021-02-22 DIAGNOSIS — E79.0 HYPERURICEMIA: Chronic | ICD-10-CM

## 2021-02-22 DIAGNOSIS — N18.30 CHRONIC KIDNEY DISEASE, STAGE III (MODERATE) (HCC): ICD-10-CM

## 2021-02-22 DIAGNOSIS — E11.319 TYPE 2 DIABETES MELLITUS WITH BOTH EYES AFFECTED BY RETINOPATHY WITHOUT MACULAR EDEMA, WITHOUT LONG-TERM CURRENT USE OF INSULIN, UNSPECIFIED RETINOPATHY SEVERITY (HCC): Chronic | ICD-10-CM

## 2021-02-22 DIAGNOSIS — N18.30 TYPE 2 DIABETES MELLITUS WITH STAGE 3 CHRONIC KIDNEY DISEASE, WITHOUT LONG-TERM CURRENT USE OF INSULIN (HCC): ICD-10-CM

## 2021-02-22 DIAGNOSIS — E78.1 HYPERTRIGLYCERIDEMIA: Chronic | ICD-10-CM

## 2021-02-22 LAB
25(OH)D3 SERPL-MCNC: 47.3 NG/ML
ALBUMIN SERPL-MCNC: 3.9 G/DL (ref 3.5–5)
ALBUMIN/GLOB SERPL: 1.3 G/DL (ref 1.1–1.8)
ALP SERPL-CCNC: 82 U/L (ref 38–126)
ALT SERPL W P-5'-P-CCNC: 16 U/L
ANION GAP SERPL CALCULATED.3IONS-SCNC: 7 MMOL/L (ref 5–15)
AST SERPL-CCNC: 24 U/L (ref 17–59)
BASOPHILS # BLD AUTO: 0.02 10*3/MM3 (ref 0–0.2)
BASOPHILS NFR BLD AUTO: 0.3 % (ref 0–1.5)
BILIRUB SERPL-MCNC: 0.4 MG/DL (ref 0.2–1.3)
BUN SERPL-MCNC: 17 MG/DL (ref 7–23)
BUN/CREAT SERPL: 12 (ref 7–25)
CALCIUM SPEC-SCNC: 9.3 MG/DL (ref 8.4–10.2)
CHLORIDE SERPL-SCNC: 107 MMOL/L (ref 101–112)
CHOLEST SERPL-MCNC: 115 MG/DL (ref 150–200)
CO2 SERPL-SCNC: 27 MMOL/L (ref 22–30)
CREAT SERPL-MCNC: 1.42 MG/DL (ref 0.7–1.3)
DEPRECATED RDW RBC AUTO: 47.5 FL (ref 37–54)
EOSINOPHIL # BLD AUTO: 0.42 10*3/MM3 (ref 0–0.4)
EOSINOPHIL NFR BLD AUTO: 5.7 % (ref 0.3–6.2)
ERYTHROCYTE [DISTWIDTH] IN BLOOD BY AUTOMATED COUNT: 14.6 % (ref 12.3–15.4)
GFR SERPL CREATININE-BSD FRML MDRD: 48 ML/MIN/1.73 (ref 42–98)
GLOBULIN UR ELPH-MCNC: 2.9 GM/DL (ref 2.3–3.5)
GLUCOSE SERPL-MCNC: 111 MG/DL (ref 70–99)
HBA1C MFR BLD: 6.04 % (ref 4.8–5.6)
HCT VFR BLD AUTO: 40.7 % (ref 37.5–51)
HDLC SERPL-MCNC: 23 MG/DL (ref 40–59)
HGB BLD-MCNC: 13.4 G/DL (ref 13–17.7)
LDLC SERPL CALC-MCNC: 68 MG/DL
LDLC/HDLC SERPL: 2.87 {RATIO} (ref 0–3.55)
LYMPHOCYTES # BLD AUTO: 2.06 10*3/MM3 (ref 0.7–3.1)
LYMPHOCYTES NFR BLD AUTO: 27.8 % (ref 19.6–45.3)
MCH RBC QN AUTO: 30 PG (ref 26.6–33)
MCHC RBC AUTO-ENTMCNC: 32.9 G/DL (ref 31.5–35.7)
MCV RBC AUTO: 91.1 FL (ref 79–97)
MONOCYTES # BLD AUTO: 0.58 10*3/MM3 (ref 0.1–0.9)
MONOCYTES NFR BLD AUTO: 7.8 % (ref 5–12)
NEUTROPHILS NFR BLD AUTO: 4.34 10*3/MM3 (ref 1.7–7)
NEUTROPHILS NFR BLD AUTO: 58.4 % (ref 42.7–76)
PHOSPHATE SERPL-MCNC: 3.4 MG/DL (ref 2.5–4.5)
PLATELET # BLD AUTO: 189 10*3/MM3 (ref 140–450)
PMV BLD AUTO: 11 FL (ref 6–12)
POTASSIUM SERPL-SCNC: 4.3 MMOL/L (ref 3.4–5)
PROT SERPL-MCNC: 6.8 G/DL (ref 6.3–8.6)
RBC # BLD AUTO: 4.47 10*6/MM3 (ref 4.14–5.8)
SODIUM SERPL-SCNC: 141 MMOL/L (ref 137–145)
TRIGL SERPL-MCNC: 130 MG/DL
URATE SERPL-MCNC: 7 MG/DL (ref 3.5–8.5)
VLDLC SERPL-MCNC: 24 MG/DL (ref 5–40)
WBC # BLD AUTO: 7.42 10*3/MM3 (ref 3.4–10.8)

## 2021-02-22 PROCEDURE — 85025 COMPLETE CBC W/AUTO DIFF WBC: CPT | Performed by: INTERNAL MEDICINE

## 2021-02-22 PROCEDURE — 84100 ASSAY OF PHOSPHORUS: CPT | Performed by: INTERNAL MEDICINE

## 2021-02-22 PROCEDURE — 36415 COLL VENOUS BLD VENIPUNCTURE: CPT | Performed by: INTERNAL MEDICINE

## 2021-02-22 PROCEDURE — 83036 HEMOGLOBIN GLYCOSYLATED A1C: CPT

## 2021-02-22 PROCEDURE — 84550 ASSAY OF BLOOD/URIC ACID: CPT | Performed by: INTERNAL MEDICINE

## 2021-02-22 PROCEDURE — 82306 VITAMIN D 25 HYDROXY: CPT

## 2021-02-22 PROCEDURE — 82570 ASSAY OF URINE CREATININE: CPT

## 2021-02-22 PROCEDURE — 80053 COMPREHEN METABOLIC PANEL: CPT | Performed by: INTERNAL MEDICINE

## 2021-02-22 PROCEDURE — 84156 ASSAY OF PROTEIN URINE: CPT

## 2021-02-22 PROCEDURE — 80061 LIPID PANEL: CPT | Performed by: INTERNAL MEDICINE

## 2021-02-23 LAB
CREAT UR-MCNC: 109.5 MG/DL
PROT UR-MCNC: 9 MG/DL
PROT/CREAT UR: 82.2 MG/G CREA (ref 0–200)

## 2021-02-26 ENCOUNTER — OFFICE VISIT (OUTPATIENT)
Dept: FAMILY MEDICINE CLINIC | Facility: CLINIC | Age: 81
End: 2021-02-26

## 2021-02-26 VITALS
WEIGHT: 268 LBS | HEART RATE: 70 BPM | TEMPERATURE: 97 F | DIASTOLIC BLOOD PRESSURE: 60 MMHG | OXYGEN SATURATION: 92 % | HEIGHT: 68 IN | BODY MASS INDEX: 40.62 KG/M2 | SYSTOLIC BLOOD PRESSURE: 120 MMHG

## 2021-02-26 DIAGNOSIS — I10 ESSENTIAL HYPERTENSION: Chronic | ICD-10-CM

## 2021-02-26 DIAGNOSIS — E11.319 DIABETIC RETINOPATHY OF BOTH EYES WITHOUT MACULAR EDEMA ASSOCIATED WITH TYPE 2 DIABETES MELLITUS, UNSPECIFIED RETINOPATHY SEVERITY (HCC): Chronic | ICD-10-CM

## 2021-02-26 DIAGNOSIS — M10.30 GOUT DUE TO RENAL IMPAIRMENT, UNSPECIFIED CHRONICITY, UNSPECIFIED SITE: Chronic | ICD-10-CM

## 2021-02-26 DIAGNOSIS — K59.04 CHRONIC IDIOPATHIC CONSTIPATION: Chronic | ICD-10-CM

## 2021-02-26 DIAGNOSIS — E79.0 HYPERURICEMIA: Chronic | ICD-10-CM

## 2021-02-26 DIAGNOSIS — N18.31 STAGE 3A CHRONIC KIDNEY DISEASE (HCC): Chronic | ICD-10-CM

## 2021-02-26 DIAGNOSIS — C61 MALIGNANT TUMOR OF PROSTATE (HCC): Chronic | ICD-10-CM

## 2021-02-26 DIAGNOSIS — N18.31 TYPE 2 DIABETES MELLITUS WITH STAGE 3A CHRONIC KIDNEY DISEASE, WITHOUT LONG-TERM CURRENT USE OF INSULIN (HCC): Primary | Chronic | ICD-10-CM

## 2021-02-26 DIAGNOSIS — J43.2 CENTRILOBULAR EMPHYSEMA (HCC): Chronic | ICD-10-CM

## 2021-02-26 DIAGNOSIS — E11.42 DIABETIC PERIPHERAL NEUROPATHY (HCC): ICD-10-CM

## 2021-02-26 DIAGNOSIS — I25.10 CORONARY ARTERIOSCLEROSIS: Chronic | ICD-10-CM

## 2021-02-26 DIAGNOSIS — E55.9 VITAMIN D DEFICIENCY: ICD-10-CM

## 2021-02-26 DIAGNOSIS — K62.5 RECTAL BLEEDING: ICD-10-CM

## 2021-02-26 DIAGNOSIS — E11.22 TYPE 2 DIABETES MELLITUS WITH STAGE 3A CHRONIC KIDNEY DISEASE, WITHOUT LONG-TERM CURRENT USE OF INSULIN (HCC): Primary | Chronic | ICD-10-CM

## 2021-02-26 DIAGNOSIS — E11.319 TYPE 2 DIABETES MELLITUS WITH BOTH EYES AFFECTED BY RETINOPATHY WITHOUT MACULAR EDEMA, WITHOUT LONG-TERM CURRENT USE OF INSULIN, UNSPECIFIED RETINOPATHY SEVERITY (HCC): Chronic | ICD-10-CM

## 2021-02-26 DIAGNOSIS — K21.9 GASTROESOPHAGEAL REFLUX DISEASE, UNSPECIFIED WHETHER ESOPHAGITIS PRESENT: Chronic | ICD-10-CM

## 2021-02-26 PROBLEM — E11.69 HYPERLIPIDEMIA ASSOCIATED WITH TYPE 2 DIABETES MELLITUS: Chronic | Status: ACTIVE | Noted: 2021-02-26

## 2021-02-26 PROBLEM — E78.5 HYPERLIPIDEMIA ASSOCIATED WITH TYPE 2 DIABETES MELLITUS (HCC): Chronic | Status: ACTIVE | Noted: 2021-02-26

## 2021-02-26 PROCEDURE — 99215 OFFICE O/P EST HI 40 MIN: CPT | Performed by: INTERNAL MEDICINE

## 2021-02-26 RX ORDER — GABAPENTIN 600 MG/1
600 TABLET ORAL NIGHTLY
Qty: 30 TABLET | Refills: 5 | Status: SHIPPED | OUTPATIENT
Start: 2021-02-26 | End: 2021-07-30

## 2021-02-26 NOTE — PATIENT INSTRUCTIONS
Exercising to Lose Weight  Exercise is structured, repetitive physical activity to improve fitness and health. Getting regular exercise is important for everyone. It is especially important if you are overweight. Being overweight increases your risk of heart disease, stroke, diabetes, high blood pressure, and several types of cancer. Reducing your calorie intake and exercising can help you lose weight.  Exercise is usually categorized as moderate or vigorous intensity. To lose weight, most people need to do a certain amount of moderate-intensity or vigorous-intensity exercise each week.  Moderate-intensity exercise    Moderate-intensity exercise is any activity that gets you moving enough to burn at least three times more energy (calories) than if you were sitting.  Examples of moderate exercise include:  · Walking a mile in 15 minutes.  · Doing light yard work.  · Biking at an easy pace.  Most people should get at least 150 minutes (2 hours and 30 minutes) a week of moderate-intensity exercise to maintain their body weight.  Vigorous-intensity exercise  Vigorous-intensity exercise is any activity that gets you moving enough to burn at least six times more calories than if you were sitting. When you exercise at this intensity, you should be working hard enough that you are not able to carry on a conversation.  Examples of vigorous exercise include:  · Running.  · Playing a team sport, such as football, basketball, and soccer.  · Jumping rope.  Most people should get at least 75 minutes (1 hour and 15 minutes) a week of vigorous-intensity exercise to maintain their body weight.  How can exercise affect me?  When you exercise enough to burn more calories than you eat, you lose weight. Exercise also reduces body fat and builds muscle. The more muscle you have, the more calories you burn. Exercise also:  · Improves mood.  · Reduces stress and tension.  · Improves your overall fitness, flexibility, and  endurance.  · Increases bone strength.  The amount of exercise you need to lose weight depends on:  · Your age.  · The type of exercise.  · Any health conditions you have.  · Your overall physical ability.  Talk to your health care provider about how much exercise you need and what types of activities are safe for you.  What actions can I take to lose weight?  Nutrition    · Make changes to your diet as told by your health care provider or diet and nutrition specialist (dietitian). This may include:  ? Eating fewer calories.  ? Eating more protein.  ? Eating less unhealthy fats.  ? Eating a diet that includes fresh fruits and vegetables, whole grains, low-fat dairy products, and lean protein.  ? Avoiding foods with added fat, salt, and sugar.  · Drink plenty of water while you exercise to prevent dehydration or heat stroke.  Activity  · Choose an activity that you enjoy and set realistic goals. Your health care provider can help you make an exercise plan that works for you.  · Exercise at a moderate or vigorous intensity most days of the week.  ? The intensity of exercise may vary from person to person. You can tell how intense a workout is for you by paying attention to your breathing and heartbeat. Most people will notice their breathing and heartbeat get faster with more intense exercise.  · Do resistance training twice each week, such as:  ? Push-ups.  ? Sit-ups.  ? Lifting weights.  ? Using resistance bands.  · Getting short amounts of exercise can be just as helpful as long structured periods of exercise. If you have trouble finding time to exercise, try to include exercise in your daily routine.  ? Get up, stretch, and walk around every 30 minutes throughout the day.  ? Go for a walk during your lunch break.  ? Park your car farther away from your destination.  ? If you take public transportation, get off one stop early and walk the rest of the way.  ? Make phone calls while standing up and walking  around.  ? Take the stairs instead of elevators or escalators.  · Wear comfortable clothes and shoes with good support.  · Do not exercise so much that you hurt yourself, feel dizzy, or get very short of breath.  Where to find more information  · [place] Department of Health and Human Services: www.hhs.gov  · Centers for Disease Control and Prevention (CDC): www.cdc.gov  Contact a health care provider:  · Before starting a new exercise program.  · If you have questions or concerns about your weight.  · If you have a medical problem that keeps you from exercising.  Get help right away if you have any of the following while exercising:  · Injury.  · Dizziness.  · Difficulty breathing or shortness of breath that does not go away when you stop exercising.  · Chest pain.  · Rapid heartbeat.  Summary  · Being overweight increases your risk of heart disease, stroke, diabetes, high blood pressure, and several types of cancer.  · Losing weight happens when you burn more calories than you eat.  · Reducing the amount of calories you eat in addition to getting regular moderate or vigorous exercise each week helps you lose weight.  This information is not intended to replace advice given to you by your health care provider. Make sure you discuss any questions you have with your health care provider.  Document Revised: 12/31/2018 Document Reviewed: 12/31/2018  [employer] Patient Education © 2020 [employer]      Calorie Counting for Weight Loss  Calories are units of energy. Your body needs a certain amount of calories from food to keep you going throughout the day. When you eat more calories than your body needs, your body stores the extra calories as fat. When you eat fewer calories than your body needs, your body burns fat to get the energy it needs.  Calorie counting means keeping track of how many calories you eat and drink each day. Calorie counting can be helpful if you need to lose weight. If you make sure to eat fewer  calories than your body needs, you should lose weight. Ask your health care provider what a healthy weight is for you.  For calorie counting to work, you will need to eat the right number of calories in a day in order to lose a healthy amount of weight per week. A dietitian can help you determine how many calories you need in a day and will give you suggestions on how to reach your calorie goal.  · A healthy amount of weight to lose per week is usually 1-2 lb (0.5-0.9 kg). This usually means that your daily calorie intake should be reduced by 500-750 calories.  · Eating 1,200 - 1,500 calories per day can help most women lose weight.  · Eating 1,500 - 1,800 calories per day can help most men lose weight.  What is my plan?  My goal is to have __________ calories per day.  If I have this many calories per day, I should lose around __________ pounds per week.  What do I need to know about calorie counting?  In order to meet your daily calorie goal, you will need to:  · Find out how many calories are in each food you would like to eat. Try to do this before you eat.  · Decide how much of the food you plan to eat.  · Write down what you ate and how many calories it had. Doing this is called keeping a food log.  To successfully lose weight, it is important to balance calorie counting with a healthy lifestyle that includes regular activity. Aim for 150 minutes of moderate exercise (such as walking) or 75 minutes of vigorous exercise (such as running) each week.  Where do I find calorie information?    The number of calories in a food can be found on a Nutrition Facts label. If a food does not have a Nutrition Facts label, try to look up the calories online or ask your dietitian for help.  Remember that calories are listed per serving. If you choose to have more than one serving of a food, you will have to multiply the calories per serving by the amount of servings you plan to eat. For example, the label on a package of  "bread might say that a serving size is 1 slice and that there are 90 calories in a serving. If you eat 1 slice, you will have eaten 90 calories. If you eat 2 slices, you will have eaten 180 calories.  How do I keep a food log?  Immediately after each meal, record the following information in your food log:  · What you ate. Don't forget to include toppings, sauces, and other extras on the food.  · How much you ate. This can be measured in cups, ounces, or number of items.  · How many calories each food and drink had.  · The total number of calories in the meal.  Keep your food log near you, such as in a small notebook in your pocket, or use a mobile kelby or website. Some programs will calculate calories for you and show you how many calories you have left for the day to meet your goal.  What are some calorie counting tips?    · Use your calories on foods and drinks that will fill you up and not leave you hungry:  ? Some examples of foods that fill you up are nuts and nut butters, vegetables, lean proteins, and high-fiber foods like whole grains. High-fiber foods are foods with more than 5 g fiber per serving.  ? Drinks such as sodas, specialty coffee drinks, alcohol, and juices have a lot of calories, yet do not fill you up.  · Eat nutritious foods and avoid empty calories. Empty calories are calories you get from foods or beverages that do not have many vitamins or protein, such as candy, sweets, and soda. It is better to have a nutritious high-calorie food (such as an avocado) than a food with few nutrients (such as a bag of chips).  · Know how many calories are in the foods you eat most often. This will help you calculate calorie counts faster.  · Pay attention to calories in drinks. Low-calorie drinks include water and unsweetened drinks.  · Pay attention to nutrition labels for \"low fat\" or \"fat free\" foods. These foods sometimes have the same amount of calories or more calories than the full fat versions. They " also often have added sugar, starch, or salt, to make up for flavor that was removed with the fat.  · Find a way of tracking calories that works for you. Get creative. Try different apps or programs if writing down calories does not work for you.  What are some portion control tips?  · Know how many calories are in a serving. This will help you know how many servings of a certain food you can have.  · Use a measuring cup to measure serving sizes. You could also try weighing out portions on a kitchen scale. With time, you will be able to estimate serving sizes for some foods.  · Take some time to put servings of different foods on your favorite plates, bowls, and cups so you know what a serving looks like.  · Try not to eat straight from a bag or box. Doing this can lead to overeating. Put the amount you would like to eat in a cup or on a plate to make sure you are eating the right portion.  · Use smaller plates, glasses, and bowls to prevent overeating.  · Try not to multitask (for example, watch TV or use your computer) while eating. If it is time to eat, sit down at a table and enjoy your food. This will help you to know when you are full. It will also help you to be aware of what you are eating and how much you are eating.  What are tips for following this plan?  Reading food labels  · Check the calorie count compared to the serving size. The serving size may be smaller than what you are used to eating.  · Check the source of the calories. Make sure the food you are eating is high in vitamins and protein and low in saturated and trans fats.  Shopping  · Read nutrition labels while you shop. This will help you make healthy decisions before you decide to purchase your food.  · Make a grocery list and stick to it.  Cooking  · Try to cook your favorite foods in a healthier way. For example, try baking instead of frying.  · Use low-fat dairy products.  Meal planning  · Use more fruits and vegetables. Half of your  "plate should be fruits and vegetables.  · Include lean proteins like poultry and fish.  How do I count calories when eating out?  · Ask for smaller portion sizes.  · Consider sharing an entree and sides instead of getting your own entree.  · If you get your own entree, eat only half. Ask for a box at the beginning of your meal and put the rest of your entree in it so you are not tempted to eat it.  · If calories are listed on the menu, choose the lower calorie options.  · Choose dishes that include vegetables, fruits, whole grains, low-fat dairy products, and lean protein.  · Choose items that are boiled, broiled, grilled, or steamed. Stay away from items that are buttered, battered, fried, or served with cream sauce. Items labeled \"crispy\" are usually fried, unless stated otherwise.  · Choose water, low-fat milk, unsweetened iced tea, or other drinks without added sugar. If you want an alcoholic beverage, choose a lower calorie option such as a glass of wine or light beer.  · Ask for dressings, sauces, and syrups on the side. These are usually high in calories, so you should limit the amount you eat.  · If you want a salad, choose a garden salad and ask for grilled meats. Avoid extra toppings like baum, cheese, or fried items. Ask for the dressing on the side, or ask for olive oil and vinegar or lemon to use as dressing.  · Estimate how many servings of a food you are given. For example, a serving of cooked rice is ½ cup or about the size of half a baseball. Knowing serving sizes will help you be aware of how much food you are eating at restaurants. The list below tells you how big or small some common portion sizes are based on everyday objects:  ? 1 oz--4 stacked dice.  ? 3 oz--1 deck of cards.  ? 1 tsp--1 die.  ? 1 Tbsp--½ a ping-pong ball.  ? 2 Tbsp--1 ping-pong ball.  ? ½ cup--½ baseball.  ? 1 cup--1 baseball.  Summary  · Calorie counting means keeping track of how many calories you eat and drink each day. If " you eat fewer calories than your body needs, you should lose weight.  · A healthy amount of weight to lose per week is usually 1-2 lb (0.5-0.9 kg). This usually means reducing your daily calorie intake by 500-750 calories.  · The number of calories in a food can be found on a Nutrition Facts label. If a food does not have a Nutrition Facts label, try to look up the calories online or ask your dietitian for help.  · Use your calories on foods and drinks that will fill you up, and not on foods and drinks that will leave you hungry.  · Use smaller plates, glasses, and bowls to prevent overeating.  This information is not intended to replace advice given to you by your health care provider. Make sure you discuss any questions you have with your health care provider.  Document Revised: 09/06/2019 Document Reviewed: 11/17/2017  [employer] Patient Education © 2020 [employer]

## 2021-02-26 NOTE — PROGRESS NOTES
Chief Complaint  Follow-up (6 month Talk about colonoscopy he had )    Subjective          History of Present Illness     Eleno Savage presents to [facility] for 6-month follow up on type 2 diabetes mellitus, stage 2 chronic kidney disease, obesity, COPD, GERD, vitamin D deficiency, and high cholesterol/high triglycerides.  He follows with Dr. Weathers, nephrologist, annually for CKD and renal function improved from six months ago. Dr. Mesa is his cardiologist.  Denies chest pain.  He is scheduled for a routine cardiology visit in one week.  He has a history of prostate cancer in 2001 with no evidence of recurrence.  He continues on prophylactic allopurinol for gout.  Denies any recent gout flares.  Uric acid is 7.0.      We referred patient for repeat colonosocpy last month to follow up on rectal outlet bleeding.  Patient had colonoscopy 02/08/2021 by Dr. Erickson revealing one 1 small tubular adenomatous polyp as well as diverticulosis sigmoid, ascending and descending colon.  He suspected the rectal bleeding was related to hemorrhoids and the fact that he is on Plavix.  Patient has had no further episodes of rectal bleeding.  Dr. Erickson recommended repeat colonoscopy in three years.  Since he had the colonoscopy, he has been having some constipation issues despite taking two stool softeners about every third day.     He continues on Neurontin each evening for diabetic neuropathy and denies significant side effects including excessive daytime sedation.  Patient understands the risks associated with this controlled medication, including tolerance and addiction.    Patient has been fully vaccinated for COVID-19 and pneumonia.  He has had one dose of Shingrix.  We have sent the second dose to the pharmacy today.      Weight is up 9 pounds in the past six months.      The patient's relevant past medical, surgical, and social history was reviewed in Epic.   Lab results are reviewed  "with the patient today. CBC unremarkable.  A1c 6.04.  Fasting glucose 111.  Normal liver function.  Renal function improved with creatinine 1.4.  Lipids at goal, although, HDL cholesterol remains low at 27.  Vitamin D at goal.       Objective   Vital Signs:     /60   Pulse 70   Temp 97 °F (36.1 °C) (Infrared)   Ht 172.7 cm (68\")   Wt 122 kg (268 lb)   SpO2 92%   BMI 40.75 kg/m²         Physical Exam  Vitals signs reviewed.   Constitutional:       General: He is not in acute distress.     Appearance: He is well-developed.      Comments: Morbidly obese male.     HENT:      Head: Normocephalic and atraumatic.      Nose:      Right Sinus: No maxillary sinus tenderness or frontal sinus tenderness.      Left Sinus: No maxillary sinus tenderness or frontal sinus tenderness.      Mouth/Throat:      Mouth: No oral lesions.      Pharynx: Uvula midline.      Tonsils: No tonsillar exudate.   Eyes:      Conjunctiva/sclera: Conjunctivae normal.      Pupils: Pupils are equal, round, and reactive to light.   Neck:      Musculoskeletal: Neck supple.      Thyroid: No thyroid mass or thyromegaly.      Vascular: No carotid bruit or JVD.      Trachea: Trachea normal. No tracheal deviation.   Cardiovascular:      Rate and Rhythm: Normal rate and regular rhythm.  No extrasystoles are present.     Chest Wall: PMI is not displaced.      Heart sounds: Normal heart sounds. No murmur.   Pulmonary:      Effort: Pulmonary effort is normal. No accessory muscle usage or respiratory distress.      Breath sounds: Normal breath sounds. No decreased breath sounds, wheezing, rhonchi or rales.   Abdominal:      General: Bowel sounds are normal. There is no distension.      Palpations: Abdomen is soft.      Tenderness: There is no abdominal tenderness.      Comments: Obese abdomen limits exam.     Feet:      Comments: Trace edema bilateral ankles.   Lymphadenopathy:      Cervical: No cervical adenopathy.   Skin:     General: Skin is warm and " dry.      Findings: No rash.      Nails: There is no clubbing.     Neurological:      Mental Status: He is alert and oriented to person, place, and time.      Cranial Nerves: No cranial nerve deficit.      Coordination: Coordination normal.   Psychiatric:         Speech: Speech normal.         Behavior: Behavior normal.         Thought Content: Thought content normal.         Judgment: Judgment normal.            Result Review :     CMP    CMP 8/3/20 1/13/21 2/22/21   Glucose 125 (A) 128 (A) 111 (A)   BUN 21 20 17   Creatinine 1.34 (A) 1.42 (A) 1.42 (A)   eGFR Non  Am 51 48 48   Sodium 141 139 141   Potassium 4.4 4.1 4.3   Chloride 105 106 107   Calcium 9.8 9.3 9.3   Albumin 4.10 4.00 3.90   Total Bilirubin 0.1 (A) 0.4 0.4   Alkaline Phosphatase 107 105 82   AST (SGOT) 30 19 24   ALT (SGPT) 17 16 16   (A) Abnormal value            CBC w/diff    CBC w/Diff 8/3/20 1/13/21 2/22/21   WBC 8.50 10.19 7.42   RBC 4.65 4.54 4.47   Hemoglobin 14.3 13.8 13.4   Hematocrit 42.3 41.5 40.7   MCV 91.0 91.4 91.1   MCH 30.8 30.4 30.0   MCHC 33.8 33.3 32.9   RDW 14.6 14.8 14.6   Platelets 186 188 189   Neutrophil Rel % 63.0 69.9 58.4   Lymphocyte Rel % 25.1 20.5 27.8   Monocyte Rel % 8.1 7.2 7.8   Eosinophil Rel % 3.6 2.1 5.7   Basophil Rel % 0.2 0.3 0.3           Lipid Panel    Lipid Panel 8/3/20 2/22/21   Total Cholesterol  115 (A)   Triglycerides  130   HDL Cholesterol  23 (A)   VLDL Cholesterol  24   LDL Cholesterol  65 68   LDL/HDL Ratio  2.87   (A) Abnormal value            A1C Last 3 Results    HGBA1C Last 3 Results 8/3/20 2/22/21   Hemoglobin A1C 6.10 (A) 6.04 (A)   (A) Abnormal value            PSA    PSA 8/3/20   PSA 0.098           Data reviewed: Consultant notes General surgery/colonoscopy          Assessment and Plan    Diagnoses and all orders for this visit:    1. Type 2 diabetes mellitus with stage 3a chronic kidney disease, without long-term current use of insulin (CMS/HCC) (Primary)  -     CBC Auto  Differential; Future  -     Comprehensive Metabolic Panel; Future  -     Hemoglobin A1c; Future  -     LDL Cholesterol, Direct; Future  -     TSH; Future    2. Diabetic peripheral neuropathy (CMS/[facility])  -     gabapentin (NEURONTIN) 600 MG tablet; Take 1 tablet by mouth Every Night.  Dispense: 30 tablet; Refill: 5    3. Type 2 diabetes mellitus with both eyes affected by retinopathy without macular edema, without long-term current use of insulin, unspecified retinopathy severity (CMS/[facility])  -     Hemoglobin A1c; Future  -     TSH; Future    4. Essential hypertension  -     Comprehensive Metabolic Panel; Future    5. Coronary arteriosclerosis - followed by Dr. Farooq  -     LDL Cholesterol, Direct; Future    6. 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 Gastroesophageal reflux disease, unspecified whether esophagitis present    8. Stage 3a chronic kidney disease (CMS/[facility])  -     Comprehensive Metabolic Panel; Future  -     Vitamin D 25 Hydroxy; Future    9. Malignant tumor of prostate (CMS/[facility]) - h/o 2001  -     PSA DIAGNOSTIC; Future    10. Hyperuricemia  -     Uric Acid; Future    11. Gout due to renal impairment, unspecified chronicity, unspecified site  -     Uric Acid; Future    12. Centrilobular emphysema (CMS/[facility])  -     CBC Auto Differential; Future  -     Comprehensive Metabolic Panel; Future    13. Vitamin D deficiency    14. Rectal bleeding    15. Chronic idiopathic constipation    Other orders  -     Zoster Vac Recomb Adjuvanted 50 MCG/0.5ML reconstituted suspension; Inject 0.5 mL into the appropriate muscle as directed by prescriber 1 (One) Time for 1 dose.  Dispense: 1 each; Refill: 0      I spent 43 minutes caring for Eleno on this date of service. This time includes time spent by me in the following activities:preparing for the visit, reviewing tests, obtaining and/or reviewing a separately obtained  history, performing a medically appropriate examination and/or evaluation , counseling and educating the patient/family/caregiver, ordering medications, tests, or procedures and documenting information in the medical record     Continue diet control for diabetes.      For the constipation, I recommended daily fiber therapy with Miralax one tsp in 8 ounce glass of liquid.  Continue stool softeners, taking at least one daily.      Continue Neurontin for diabetic neuropathy.  Refills sent. Patient understands the risks associated with this controlled medication, including tolerance and addiction.  He also agrees to only obtain this medication from me, and not from a another provider, unless that provider is covering for me in my absence.  He also agrees to be compliant in dosing, and not self adjust the dose of medication.  A signed controlled substance agreement is on file, and he has received a controlled substance education sheet at this a previous visit.  He has also signed a consent for treatment with a controlled substance as per [facility] policy. NEFTALY was obtained.     Renal function is improved from six months ago.  Continue to avoid NSAIDs and other nephrotoxic drugs.  Continue to follow with his nephrologist, Dr. Weathers, annually.    2nd dose of Shingrix sent to the pharmacy.      Continue current BP medications.  Monitor BP and notify me if not consistently at goal.        Continue Crestor. Cholesterol at goal, although, HDL cholesterol remains low.   Pursue weight loss.      Continue nebulizer treatments and inhalers to manage his COPD.    Dr. Erickson recommended repeat colonoscopy in three years, although, patient will be 84.  This has been updated in patient's health maintenance.       Continue other current medications and vitamin/mineral supplements to treat the other medical issues we addressed today.  Return in six months for follow up with fasting labs one week prior.      Scribed for Dr. Israel  by Jacob Davalos.   Follow Up   Return in about 6 months (around 8/26/2021) for Follow up in six months with labs one week prior., Next scheduled follow up - labs 1 week prior.  Patient was given instructions and counseling regarding his condition or for health maintenance advice. Please see specific information pulled into the AVS if appropriate.

## 2021-03-22 ENCOUNTER — CLINICAL SUPPORT (OUTPATIENT)
Dept: FAMILY MEDICINE CLINIC | Facility: CLINIC | Age: 81
End: 2021-03-22

## 2021-03-22 DIAGNOSIS — Z23 NEED FOR SHINGLES VACCINE: Primary | ICD-10-CM

## 2021-03-22 PROCEDURE — 90750 HZV VACC RECOMBINANT IM: CPT | Performed by: INTERNAL MEDICINE

## 2021-03-22 PROCEDURE — 90471 IMMUNIZATION ADMIN: CPT | Performed by: INTERNAL MEDICINE

## 2021-04-12 RX ORDER — OMEPRAZOLE 40 MG/1
CAPSULE, DELAYED RELEASE ORAL
Qty: 60 CAPSULE | Refills: 5 | Status: SHIPPED | OUTPATIENT
Start: 2021-04-12 | End: 2021-08-30

## 2021-04-12 RX ORDER — CETIRIZINE HYDROCHLORIDE 10 MG/1
10 TABLET ORAL DAILY PRN
Qty: 30 TABLET | Refills: 5 | Status: SHIPPED | OUTPATIENT
Start: 2021-04-12 | End: 2021-08-30

## 2021-05-17 RX ORDER — AMLODIPINE BESYLATE AND BENAZEPRIL HYDROCHLORIDE 5; 20 MG/1; MG/1
CAPSULE ORAL
Qty: 180 CAPSULE | Refills: 1 | Status: SHIPPED | OUTPATIENT
Start: 2021-05-17 | End: 2021-07-30

## 2021-06-07 RX ORDER — TRAZODONE HYDROCHLORIDE 150 MG/1
TABLET ORAL
Qty: 90 TABLET | Refills: 3 | Status: SHIPPED | OUTPATIENT
Start: 2021-06-07 | End: 2022-05-26

## 2021-06-15 ENCOUNTER — OFFICE VISIT (OUTPATIENT)
Dept: OTOLARYNGOLOGY | Facility: CLINIC | Age: 81
End: 2021-06-15

## 2021-06-15 VITALS — OXYGEN SATURATION: 99 % | HEART RATE: 84 BPM | HEIGHT: 68 IN | WEIGHT: 257.4 LBS | BODY MASS INDEX: 39.01 KG/M2

## 2021-06-15 DIAGNOSIS — G47.33 OBSTRUCTIVE SLEEP APNEA: Primary | ICD-10-CM

## 2021-06-15 PROCEDURE — 99213 OFFICE O/P EST LOW 20 MIN: CPT | Performed by: OTOLARYNGOLOGY

## 2021-06-15 NOTE — PROGRESS NOTES
"Subjective   Eleno Savage is a 81 y.o. male.       History of Present Illness   Patient has obstructive sleep apnea syndrome.  Has used his CPAP machine for many years.  I have tried to refer him to sleep medicine in the past but he has previously declined.  He usually comes in once a year with compliance data from his CPAP machine and its always been excellent however he comes in today stating that he has been told \"my machine is too old\" to print out compliance data.  He says he wants a new CPAP machine.  Also previously had an otitis externa and wears hearing aids but says his ears have not been bothering him.      The following portions of the patient's history were reviewed and updated as appropriate: allergies, current medications, past family history, past medical history, past social history, past surgical history and problem list.     reports that he quit smoking about 36 years ago. His smoking use included cigarettes. He has a 82.00 pack-year smoking history. He has never used smokeless tobacco. He reports that he does not drink alcohol and does not use drugs.   Patient is not a tobacco user and has not been counseled for use of tobacco products      Review of Systems        Objective   Physical Exam  Ears: External ears no deformity, canals no discharge, tympanic membranes intact clear and mobile bilaterally.  Nares: Boggy mucosa no discharge or purulence  Oral cavity: No masses or lesions  Pharynx: No erythema or exudate  Neck: No lymphadenopathy.  No thyromegaly.  Trachea and larynx midline.  No masses in the parotid or submandibular glands.      Assessment/Plan   Diagnoses and all orders for this visit:    1. Obstructive sleep apnea (Primary)        Plan: I explained to the patient that without compliance data or data on settings I really could not prescribe him a new CPAP machine.  I have explained to him that he really needs to be seen in the sleep medicine clinic and at this point he agrees to a " referral to sleep medicine.  He requests to be seen at the [facility] if they are accepting patients and have a provider.  If not he will be referred to the sleep lab here at [place] with Dr. Merritt.  I will see him again as needed.

## 2021-06-16 DIAGNOSIS — G47.33 OBSTRUCTIVE SLEEP APNEA SYNDROME: Primary | ICD-10-CM

## 2021-06-17 DIAGNOSIS — G47.33 OBSTRUCTIVE SLEEP APNEA SYNDROME: Primary | ICD-10-CM

## 2021-07-20 ENCOUNTER — TRANSCRIBE ORDERS (OUTPATIENT)
Dept: LAB | Facility: OTHER | Age: 81
End: 2021-07-20

## 2021-07-20 ENCOUNTER — LAB (OUTPATIENT)
Dept: LAB | Facility: OTHER | Age: 81
End: 2021-07-20

## 2021-07-20 DIAGNOSIS — N18.30 STAGE 3 CHRONIC KIDNEY DISEASE, UNSPECIFIED WHETHER STAGE 3A OR 3B CKD (HCC): Primary | ICD-10-CM

## 2021-07-20 DIAGNOSIS — N18.30 STAGE 3 CHRONIC KIDNEY DISEASE, UNSPECIFIED WHETHER STAGE 3A OR 3B CKD (HCC): ICD-10-CM

## 2021-07-20 LAB
ALBUMIN SERPL-MCNC: 3.5 G/DL (ref 3.5–5)
ANION GAP SERPL CALCULATED.3IONS-SCNC: 5 MMOL/L (ref 5–15)
BUN SERPL-MCNC: 15 MG/DL (ref 7–23)
BUN/CREAT SERPL: 10.6 (ref 7–25)
CALCIUM SPEC-SCNC: 8.7 MG/DL (ref 8.4–10.2)
CHLORIDE SERPL-SCNC: 110 MMOL/L (ref 101–112)
CO2 SERPL-SCNC: 25 MMOL/L (ref 22–30)
CREAT SERPL-MCNC: 1.41 MG/DL (ref 0.7–1.3)
GFR SERPL CREATININE-BSD FRML MDRD: 48 ML/MIN/1.73 (ref 42–98)
GLUCOSE SERPL-MCNC: 118 MG/DL (ref 70–99)
PHOSPHATE SERPL-MCNC: 3.4 MG/DL (ref 2.5–4.5)
POTASSIUM SERPL-SCNC: 4.1 MMOL/L (ref 3.4–5)
SODIUM SERPL-SCNC: 140 MMOL/L (ref 137–145)

## 2021-07-20 PROCEDURE — 84156 ASSAY OF PROTEIN URINE: CPT | Performed by: INTERNAL MEDICINE

## 2021-07-20 PROCEDURE — 82570 ASSAY OF URINE CREATININE: CPT | Performed by: INTERNAL MEDICINE

## 2021-07-20 PROCEDURE — 36415 COLL VENOUS BLD VENIPUNCTURE: CPT | Performed by: INTERNAL MEDICINE

## 2021-07-20 PROCEDURE — 80069 RENAL FUNCTION PANEL: CPT | Performed by: INTERNAL MEDICINE

## 2021-07-21 ENCOUNTER — TRANSCRIBE ORDERS (OUTPATIENT)
Dept: LAB | Facility: OTHER | Age: 81
End: 2021-07-21

## 2021-07-21 DIAGNOSIS — N18.30 STAGE 3 CHRONIC KIDNEY DISEASE, UNSPECIFIED WHETHER STAGE 3A OR 3B CKD (HCC): Primary | ICD-10-CM

## 2021-07-21 LAB
CREAT UR-MCNC: 104.1 MG/DL
PROT UR-MCNC: 8 MG/DL
PROT/CREAT UR: 76.8 MG/G CREA (ref 0–200)

## 2021-07-30 DIAGNOSIS — E11.42 DIABETIC PERIPHERAL NEUROPATHY (HCC): ICD-10-CM

## 2021-07-30 RX ORDER — ALLOPURINOL 100 MG/1
TABLET ORAL
Qty: 90 TABLET | Refills: 3 | Status: SHIPPED | OUTPATIENT
Start: 2021-07-30 | End: 2022-08-26

## 2021-07-30 RX ORDER — TOLTERODINE 4 MG/1
4 CAPSULE, EXTENDED RELEASE ORAL NIGHTLY
Qty: 90 CAPSULE | Refills: 3 | Status: SHIPPED | OUTPATIENT
Start: 2021-07-30 | End: 2022-07-29

## 2021-07-30 RX ORDER — TRIAMTERENE AND HYDROCHLOROTHIAZIDE 37.5; 25 MG/1; MG/1
TABLET ORAL
Qty: 90 TABLET | Refills: 3 | Status: SHIPPED | OUTPATIENT
Start: 2021-07-30 | End: 2022-07-29

## 2021-07-30 RX ORDER — BUSPIRONE HYDROCHLORIDE 10 MG/1
TABLET ORAL
Qty: 180 TABLET | Refills: 3 | Status: SHIPPED | OUTPATIENT
Start: 2021-07-30 | End: 2022-07-29

## 2021-07-30 RX ORDER — GABAPENTIN 600 MG/1
600 TABLET ORAL NIGHTLY
Qty: 30 TABLET | Refills: 5 | Status: SHIPPED | OUTPATIENT
Start: 2021-07-30 | End: 2022-01-21

## 2021-07-30 RX ORDER — TAMSULOSIN HYDROCHLORIDE 0.4 MG/1
1 CAPSULE ORAL NIGHTLY
Qty: 90 CAPSULE | Refills: 3 | Status: SHIPPED | OUTPATIENT
Start: 2021-07-30 | End: 2022-06-29

## 2021-07-30 RX ORDER — AMLODIPINE BESYLATE AND BENAZEPRIL HYDROCHLORIDE 5; 20 MG/1; MG/1
CAPSULE ORAL
Qty: 180 CAPSULE | Refills: 3 | Status: SHIPPED | OUTPATIENT
Start: 2021-07-30 | End: 2022-06-29

## 2021-07-30 RX ORDER — CARVEDILOL 3.12 MG/1
TABLET ORAL
Qty: 90 TABLET | Refills: 3 | Status: SHIPPED | OUTPATIENT
Start: 2021-07-30 | End: 2022-06-29

## 2021-08-30 RX ORDER — CETIRIZINE HYDROCHLORIDE 10 MG/1
10 TABLET ORAL DAILY PRN
Qty: 30 TABLET | Refills: 5 | Status: SHIPPED | OUTPATIENT
Start: 2021-08-30 | End: 2022-02-22

## 2021-08-30 RX ORDER — OMEPRAZOLE 40 MG/1
CAPSULE, DELAYED RELEASE ORAL
Qty: 60 CAPSULE | Refills: 5 | Status: SHIPPED | OUTPATIENT
Start: 2021-08-30 | End: 2022-02-22

## 2021-08-31 ENCOUNTER — LAB (OUTPATIENT)
Dept: LAB | Facility: OTHER | Age: 81
End: 2021-08-31

## 2021-08-31 DIAGNOSIS — E11.319 TYPE 2 DIABETES MELLITUS WITH BOTH EYES AFFECTED BY RETINOPATHY WITHOUT MACULAR EDEMA, WITHOUT LONG-TERM CURRENT USE OF INSULIN, UNSPECIFIED RETINOPATHY SEVERITY (HCC): Chronic | ICD-10-CM

## 2021-08-31 DIAGNOSIS — J43.2 CENTRILOBULAR EMPHYSEMA (HCC): Chronic | ICD-10-CM

## 2021-08-31 DIAGNOSIS — C61 MALIGNANT TUMOR OF PROSTATE (HCC): Chronic | ICD-10-CM

## 2021-08-31 DIAGNOSIS — M10.30 GOUT DUE TO RENAL IMPAIRMENT, UNSPECIFIED CHRONICITY, UNSPECIFIED SITE: Chronic | ICD-10-CM

## 2021-08-31 DIAGNOSIS — I10 ESSENTIAL HYPERTENSION: Chronic | ICD-10-CM

## 2021-08-31 DIAGNOSIS — I25.10 CORONARY ARTERIOSCLEROSIS: Chronic | ICD-10-CM

## 2021-08-31 DIAGNOSIS — N18.31 TYPE 2 DIABETES MELLITUS WITH STAGE 3A CHRONIC KIDNEY DISEASE, WITHOUT LONG-TERM CURRENT USE OF INSULIN (HCC): Chronic | ICD-10-CM

## 2021-08-31 DIAGNOSIS — E79.0 HYPERURICEMIA: Chronic | ICD-10-CM

## 2021-08-31 DIAGNOSIS — E11.22 TYPE 2 DIABETES MELLITUS WITH STAGE 3A CHRONIC KIDNEY DISEASE, WITHOUT LONG-TERM CURRENT USE OF INSULIN (HCC): Chronic | ICD-10-CM

## 2021-08-31 DIAGNOSIS — N18.31 STAGE 3A CHRONIC KIDNEY DISEASE (HCC): Chronic | ICD-10-CM

## 2021-08-31 LAB
25(OH)D3 SERPL-MCNC: 55 NG/ML (ref 30–100)
ALBUMIN SERPL-MCNC: 4.1 G/DL (ref 3.5–5)
ALBUMIN/GLOB SERPL: 1.4 G/DL (ref 1.1–1.8)
ALP SERPL-CCNC: 100 U/L (ref 38–126)
ALT SERPL W P-5'-P-CCNC: 16 U/L
ANION GAP SERPL CALCULATED.3IONS-SCNC: 7 MMOL/L (ref 5–15)
ARTICHOKE IGE QN: 66 MG/DL (ref 0–100)
AST SERPL-CCNC: 19 U/L (ref 17–59)
BASOPHILS # BLD AUTO: 0.02 10*3/MM3 (ref 0–0.2)
BASOPHILS NFR BLD AUTO: 0.2 % (ref 0–1.5)
BILIRUB SERPL-MCNC: 0.3 MG/DL (ref 0.2–1.3)
BUN SERPL-MCNC: 19 MG/DL (ref 7–23)
BUN/CREAT SERPL: 13.5 (ref 7–25)
CALCIUM SPEC-SCNC: 9.7 MG/DL (ref 8.4–10.2)
CHLORIDE SERPL-SCNC: 105 MMOL/L (ref 101–112)
CO2 SERPL-SCNC: 28 MMOL/L (ref 22–30)
CREAT SERPL-MCNC: 1.41 MG/DL (ref 0.7–1.3)
DEPRECATED RDW RBC AUTO: 49.1 FL (ref 37–54)
EOSINOPHIL # BLD AUTO: 0.52 10*3/MM3 (ref 0–0.4)
EOSINOPHIL NFR BLD AUTO: 5.6 % (ref 0.3–6.2)
ERYTHROCYTE [DISTWIDTH] IN BLOOD BY AUTOMATED COUNT: 14.8 % (ref 12.3–15.4)
GFR SERPL CREATININE-BSD FRML MDRD: 48 ML/MIN/1.73 (ref 42–98)
GLOBULIN UR ELPH-MCNC: 2.9 GM/DL (ref 2.3–3.5)
GLUCOSE SERPL-MCNC: 126 MG/DL (ref 70–99)
HBA1C MFR BLD: 6.13 % (ref 4.8–5.6)
HCT VFR BLD AUTO: 40.7 % (ref 37.5–51)
HGB BLD-MCNC: 13.6 G/DL (ref 13–17.7)
LYMPHOCYTES # BLD AUTO: 2.39 10*3/MM3 (ref 0.7–3.1)
LYMPHOCYTES NFR BLD AUTO: 25.9 % (ref 19.6–45.3)
MCH RBC QN AUTO: 31.3 PG (ref 26.6–33)
MCHC RBC AUTO-ENTMCNC: 33.4 G/DL (ref 31.5–35.7)
MCV RBC AUTO: 93.6 FL (ref 79–97)
MONOCYTES # BLD AUTO: 0.76 10*3/MM3 (ref 0.1–0.9)
MONOCYTES NFR BLD AUTO: 8.2 % (ref 5–12)
NEUTROPHILS NFR BLD AUTO: 5.54 10*3/MM3 (ref 1.7–7)
NEUTROPHILS NFR BLD AUTO: 60.1 % (ref 42.7–76)
PLATELET # BLD AUTO: 191 10*3/MM3 (ref 140–450)
PMV BLD AUTO: 11.1 FL (ref 6–12)
POTASSIUM SERPL-SCNC: 4.8 MMOL/L (ref 3.4–5)
PROT SERPL-MCNC: 7 G/DL (ref 6.3–8.6)
PSA SERPL-MCNC: 0.17 NG/ML (ref 0–4)
RBC # BLD AUTO: 4.35 10*6/MM3 (ref 4.14–5.8)
SODIUM SERPL-SCNC: 140 MMOL/L (ref 137–145)
TSH SERPL DL<=0.05 MIU/L-ACNC: 1.76 UIU/ML (ref 0.27–4.2)
URATE SERPL-MCNC: 7 MG/DL (ref 3.5–8.5)
WBC # BLD AUTO: 9.23 10*3/MM3 (ref 3.4–10.8)

## 2021-08-31 PROCEDURE — 83721 ASSAY OF BLOOD LIPOPROTEIN: CPT | Performed by: INTERNAL MEDICINE

## 2021-08-31 PROCEDURE — 80053 COMPREHEN METABOLIC PANEL: CPT | Performed by: INTERNAL MEDICINE

## 2021-08-31 PROCEDURE — 82306 VITAMIN D 25 HYDROXY: CPT | Performed by: INTERNAL MEDICINE

## 2021-08-31 PROCEDURE — 36415 COLL VENOUS BLD VENIPUNCTURE: CPT | Performed by: INTERNAL MEDICINE

## 2021-08-31 PROCEDURE — 83036 HEMOGLOBIN GLYCOSYLATED A1C: CPT | Performed by: INTERNAL MEDICINE

## 2021-08-31 PROCEDURE — 84443 ASSAY THYROID STIM HORMONE: CPT | Performed by: INTERNAL MEDICINE

## 2021-08-31 PROCEDURE — 85025 COMPLETE CBC W/AUTO DIFF WBC: CPT | Performed by: INTERNAL MEDICINE

## 2021-08-31 PROCEDURE — 84550 ASSAY OF BLOOD/URIC ACID: CPT | Performed by: INTERNAL MEDICINE

## 2021-08-31 PROCEDURE — 84153 ASSAY OF PSA TOTAL: CPT | Performed by: INTERNAL MEDICINE

## 2021-09-07 ENCOUNTER — OFFICE VISIT (OUTPATIENT)
Dept: FAMILY MEDICINE CLINIC | Facility: CLINIC | Age: 81
End: 2021-09-07

## 2021-09-07 VITALS
DIASTOLIC BLOOD PRESSURE: 57 MMHG | SYSTOLIC BLOOD PRESSURE: 120 MMHG | HEIGHT: 68 IN | BODY MASS INDEX: 38.95 KG/M2 | WEIGHT: 257 LBS | TEMPERATURE: 97.9 F | OXYGEN SATURATION: 96 %

## 2021-09-07 DIAGNOSIS — I25.10 CORONARY ARTERIOSCLEROSIS: Chronic | ICD-10-CM

## 2021-09-07 DIAGNOSIS — E66.01 CLASS 3 SEVERE OBESITY DUE TO EXCESS CALORIES WITH SERIOUS COMORBIDITY AND BODY MASS INDEX (BMI) OF 40.0 TO 44.9 IN ADULT (HCC): ICD-10-CM

## 2021-09-07 DIAGNOSIS — I10 ESSENTIAL HYPERTENSION: Chronic | ICD-10-CM

## 2021-09-07 DIAGNOSIS — E11.69 HYPERLIPIDEMIA ASSOCIATED WITH TYPE 2 DIABETES MELLITUS (HCC): Chronic | ICD-10-CM

## 2021-09-07 DIAGNOSIS — G47.33 OBSTRUCTIVE SLEEP APNEA SYNDROME: Chronic | ICD-10-CM

## 2021-09-07 DIAGNOSIS — E79.0 HYPERURICEMIA: Chronic | ICD-10-CM

## 2021-09-07 DIAGNOSIS — E78.1 HYPERTRIGLYCERIDEMIA: Chronic | ICD-10-CM

## 2021-09-07 DIAGNOSIS — N18.31 TYPE 2 DIABETES MELLITUS WITH STAGE 3A CHRONIC KIDNEY DISEASE, WITHOUT LONG-TERM CURRENT USE OF INSULIN (HCC): Primary | Chronic | ICD-10-CM

## 2021-09-07 DIAGNOSIS — E55.9 VITAMIN D DEFICIENCY: Chronic | ICD-10-CM

## 2021-09-07 DIAGNOSIS — C61 MALIGNANT TUMOR OF PROSTATE (HCC): Chronic | ICD-10-CM

## 2021-09-07 DIAGNOSIS — E11.22 TYPE 2 DIABETES MELLITUS WITH STAGE 3A CHRONIC KIDNEY DISEASE, WITHOUT LONG-TERM CURRENT USE OF INSULIN (HCC): Primary | Chronic | ICD-10-CM

## 2021-09-07 DIAGNOSIS — E78.5 HYPERLIPIDEMIA ASSOCIATED WITH TYPE 2 DIABETES MELLITUS (HCC): Chronic | ICD-10-CM

## 2021-09-07 PROCEDURE — 99443 PR PHYS/QHP TELEPHONE EVALUATION 21-30 MIN: CPT | Performed by: INTERNAL MEDICINE

## 2021-09-07 NOTE — PATIENT INSTRUCTIONS
Exercising to Lose Weight  Exercise is structured, repetitive physical activity to improve fitness and health. Getting regular exercise is important for everyone. It is especially important if you are overweight. Being overweight increases your risk of heart disease, stroke, diabetes, high blood pressure, and several types of cancer. Reducing your calorie intake and exercising can help you lose weight.  Exercise is usually categorized as moderate or vigorous intensity. To lose weight, most people need to do a certain amount of moderate-intensity or vigorous-intensity exercise each week.  Moderate-intensity exercise    Moderate-intensity exercise is any activity that gets you moving enough to burn at least three times more energy (calories) than if you were sitting.  Examples of moderate exercise include:  · Walking a mile in 15 minutes.  · Doing light yard work.  · Biking at an easy pace.  Most people should get at least 150 minutes (2 hours and 30 minutes) a week of moderate-intensity exercise to maintain their body weight.  Vigorous-intensity exercise  Vigorous-intensity exercise is any activity that gets you moving enough to burn at least six times more calories than if you were sitting. When you exercise at this intensity, you should be working hard enough that you are not able to carry on a conversation.  Examples of vigorous exercise include:  · Running.  · Playing a team sport, such as football, basketball, and soccer.  · Jumping rope.  Most people should get at least 75 minutes (1 hour and 15 minutes) a week of vigorous-intensity exercise to maintain their body weight.  How can exercise affect me?  When you exercise enough to burn more calories than you eat, you lose weight. Exercise also reduces body fat and builds muscle. The more muscle you have, the more calories you burn. Exercise also:  · Improves mood.  · Reduces stress and tension.  · Improves your overall fitness, flexibility, and  endurance.  · Increases bone strength.  The amount of exercise you need to lose weight depends on:  · Your age.  · The type of exercise.  · Any health conditions you have.  · Your overall physical ability.  Talk to your health care provider about how much exercise you need and what types of activities are safe for you.  What actions can I take to lose weight?  Nutrition    · Make changes to your diet as told by your health care provider or diet and nutrition specialist (dietitian). This may include:  ? Eating fewer calories.  ? Eating more protein.  ? Eating less unhealthy fats.  ? Eating a diet that includes fresh fruits and vegetables, whole grains, low-fat dairy products, and lean protein.  ? Avoiding foods with added fat, salt, and sugar.  · Drink plenty of water while you exercise to prevent dehydration or heat stroke.  Activity  · Choose an activity that you enjoy and set realistic goals. Your health care provider can help you make an exercise plan that works for you.  · Exercise at a moderate or vigorous intensity most days of the week.  ? The intensity of exercise may vary from person to person. You can tell how intense a workout is for you by paying attention to your breathing and heartbeat. Most people will notice their breathing and heartbeat get faster with more intense exercise.  · Do resistance training twice each week, such as:  ? Push-ups.  ? Sit-ups.  ? Lifting weights.  ? Using resistance bands.  · Getting short amounts of exercise can be just as helpful as long structured periods of exercise. If you have trouble finding time to exercise, try to include exercise in your daily routine.  ? Get up, stretch, and walk around every 30 minutes throughout the day.  ? Go for a walk during your lunch break.  ? Park your car farther away from your destination.  ? If you take public transportation, get off one stop early and walk the rest of the way.  ? Make phone calls while standing up and walking  around.  ? Take the stairs instead of elevators or escalators.  · Wear comfortable clothes and shoes with good support.  · Do not exercise so much that you hurt yourself, feel dizzy, or get very short of breath.  Where to find more information  · [place] Department of Health and Human Services: www.hhs.gov  · Centers for Disease Control and Prevention (CDC): www.cdc.gov  Contact a health care provider:  · Before starting a new exercise program.  · If you have questions or concerns about your weight.  · If you have a medical problem that keeps you from exercising.  Get help right away if you have any of the following while exercising:  · Injury.  · Dizziness.  · Difficulty breathing or shortness of breath that does not go away when you stop exercising.  · Chest pain.  · Rapid heartbeat.  Summary  · Being overweight increases your risk of heart disease, stroke, diabetes, high blood pressure, and several types of cancer.  · Losing weight happens when you burn more calories than you eat.  · Reducing the amount of calories you eat in addition to getting regular moderate or vigorous exercise each week helps you lose weight.  This information is not intended to replace advice given to you by your health care provider. Make sure you discuss any questions you have with your health care provider.  Document Revised: 04/15/2021 Document Reviewed: 04/15/2021  [employer] Patient Education © 2021 [employer]      Calorie Counting for Weight Loss  Calories are units of energy. Your body needs a certain number of calories from food to keep going throughout the day. When you eat or drink more calories than your body needs, your body stores the extra calories mostly as fat. When you eat or drink fewer calories than your body needs, your body burns fat to get the energy it needs.  Calorie counting means keeping track of how many calories you eat and drink each day. Calorie counting can be helpful if you need to lose weight. If you eat  fewer calories than your body needs, you should lose weight. Ask your health care provider what a healthy weight is for you.  For calorie counting to work, you will need to eat the right number of calories each day to lose a healthy amount of weight per week. A dietitian can help you figure out how many calories you need in a day and will suggest ways to reach your calorie goal.  · A healthy amount of weight to lose each week is usually 1-2 lb (0.5-0.9 kg). This usually means that your daily calorie intake should be reduced by 500-750 calories.  · Eating 1,200-1,500 calories a day can help most women lose weight.  · Eating 1,500-1,800 calories a day can help most men lose weight.  What do I need to know about calorie counting?  Work with your health care provider or dietitian to determine how many calories you should get each day. To meet your daily calorie goal, you will need to:  · Find out how many calories are in each food that you would like to eat. Try to do this before you eat.  · Decide how much of the food you plan to eat.  · Keep a food log. Do this by writing down what you ate and how many calories it had.  To successfully lose weight, it is important to balance calorie counting with a healthy lifestyle that includes regular activity.  Where do I find calorie information?    The number of calories in a food can be found on a Nutrition Facts label. If a food does not have a Nutrition Facts label, try to look up the calories online or ask your dietitian for help.  Remember that calories are listed per serving. If you choose to have more than one serving of a food, you will have to multiply the calories per serving by the number of servings you plan to eat. For example, the label on a package of bread might say that a serving size is 1 slice and that there are 90 calories in a serving. If you eat 1 slice, you will have eaten 90 calories. If you eat 2 slices, you will have eaten 180 calories.  How do I keep a  food log?  After each time that you eat, record the following in your food log as soon as possible:  · What you ate. Be sure to include toppings, sauces, and other extras on the food.  · How much you ate. This can be measured in cups, ounces, or number of items.  · How many calories were in each food and drink.  · The total number of calories in the food you ate.  Keep your food log near you, such as in a pocket-sized notebook or on an kelby or website on your mobile phone. Some programs will calculate calories for you and show you how many calories you have left to meet your daily goal.  What are some portion-control tips?  · Know how many calories are in a serving. This will help you know how many servings you can have of a certain food.  · Use a measuring cup to measure serving sizes. You could also try weighing out portions on a kitchen scale. With time, you will be able to estimate serving sizes for some foods.  · Take time to put servings of different foods on your favorite plates or in your favorite bowls and cups so you know what a serving looks like.  · Try not to eat straight from a food's packaging, such as from a bag or box. Eating straight from the package makes it hard to see how much you are eating and can lead to overeating. Put the amount you would like to eat in a cup or on a plate to make sure you are eating the right portion.  · Use smaller plates, glasses, and bowls for smaller portions and to prevent overeating.  · Try not to multitask. For example, avoid watching TV or using your computer while eating. If it is time to eat, sit down at a table and enjoy your food. This will help you recognize when you are full. It will also help you be more mindful of what and how much you are eating.  What are tips for following this plan?  Reading food labels  · Check the calorie count compared with the serving size. The serving size may be smaller than what you are used to eating.  · Check the source of the  calories. Try to choose foods that are high in protein, fiber, and vitamins, and low in saturated fat, trans fat, and sodium.  Shopping  · Read nutrition labels while you shop. This will help you make healthy decisions about which foods to buy.  · Pay attention to nutrition labels for low-fat or fat-free foods. These foods sometimes have the same number of calories or more calories than the full-fat versions. They also often have added sugar, starch, or salt to make up for flavor that was removed with the fat.  · Make a grocery list of lower-calorie foods and stick to it.  Cooking  · Try to cook your favorite foods in a healthier way. For example, try baking instead of frying.  · Use low-fat dairy products.  Meal planning  · Use more fruits and vegetables. One-half of your plate should be fruits and vegetables.  · Include lean proteins, such as chicken, turkey, and fish.  Lifestyle  Each week, aim to do one of the following:  · 150 minutes of moderate exercise, such as walking.  · 75 minutes of vigorous exercise, such as running.  General information  · Know how many calories are in the foods you eat most often. This will help you calculate calorie counts faster.  · Find a way of tracking calories that works for you. Get creative. Try different apps or programs if writing down calories does not work for you.  What foods should I eat?    · Eat nutritious foods. It is better to have a nutritious, high-calorie food, such as an avocado, than a food with few nutrients, such as a bag of potato chips.  · Use your calories on foods and drinks that will fill you up and will not leave you hungry soon after eating.  ? Examples of foods that fill you up are nuts and nut butters, vegetables, lean proteins, and high-fiber foods such as whole grains. High-fiber foods are foods with more than 5 g of fiber per serving.  · Pay attention to calories in drinks. Low-calorie drinks include water and unsweetened drinks.  The items listed  above may not be a complete list of foods and beverages you can eat. Contact a dietitian for more information.  What foods should I limit?  Limit foods or drinks that are not good sources of vitamins, minerals, or protein or that are high in unhealthy fats. These include:  · Candy.  · Other sweets.  · Sodas, specialty coffee drinks, alcohol, and juice.  The items listed above may not be a complete list of foods and beverages you should avoid. Contact a dietitian for more information.  How do I count calories when eating out?  · Pay attention to portions. Often, portions are much larger when eating out. Try these tips to keep portions smaller:  ? Consider sharing a meal instead of getting your own.  ? If you get your own meal, eat only half of it. Before you start eating, ask for a container and put half of your meal into it.  ? When available, consider ordering smaller portions from the menu instead of full portions.  · Pay attention to your food and drink choices. Knowing the way food is cooked and what is included with the meal can help you eat fewer calories.  ? If calories are listed on the menu, choose the lower-calorie options.  ? Choose dishes that include vegetables, fruits, whole grains, low-fat dairy products, and lean proteins.  ? Choose items that are boiled, broiled, grilled, or steamed. Avoid items that are buttered, battered, fried, or served with cream sauce. Items labeled as crispy are usually fried, unless stated otherwise.  ? Choose water, low-fat milk, unsweetened iced tea, or other drinks without added sugar. If you want an alcoholic beverage, choose a lower-calorie option, such as a glass of wine or light beer.  ? Ask for dressings, sauces, and syrups on the side. These are usually high in calories, so you should limit the amount you eat.  ? If you want a salad, choose a garden salad and ask for grilled meats. Avoid extra toppings such as baum, cheese, or fried items. Ask for the dressing on  the side, or ask for olive oil and vinegar or lemon to use as dressing.  · Estimate how many servings of a food you are given. Knowing serving sizes will help you be aware of how much food you are eating at restaurants.  Where to find more information  · Centers for Disease Control and Prevention: www.cdc.gov  · [place] Department of Agriculture: myplate.gov  Summary  · Calorie counting means keeping track of how many calories you eat and drink each day. If you eat fewer calories than your body needs, you should lose weight.  · A healthy amount of weight to lose per week is usually 1-2 lb (0.5-0.9 kg). This usually means reducing your daily calorie intake by 500-750 calories.  · The number of calories in a food can be found on a Nutrition Facts label. If a food does not have a Nutrition Facts label, try to look up the calories online or ask your dietitian for help.  · Use smaller plates, glasses, and bowls for smaller portions and to prevent overeating.  · Use your calories on foods and drinks that will fill you up and not leave you hungry shortly after a meal.  This information is not intended to replace advice given to you by your health care provider. Make sure you discuss any questions you have with your health care provider.  Document Revised: 01/28/2021 Document Reviewed: 01/28/2021  [employer] Patient Education © 2021 [employer]

## 2021-09-07 NOTE — PROGRESS NOTES
You have chosen to receive care through a telephone visit. Do you consent to use a telephone visit for your medical care today? Yes    Total visit time:  Total visit time: 32 minutes.     Chief Complaint  Follow-up (6 month) and Med Refill (he wants the pain gel he has had from [facility] again )    Subjective          History of Present Illness     Eleno Savage receives care today via telephone visit for 6-month follow up on type 2 diabetes mellitus, stage 2 chronic kidney disease, obesity, COPD, GERD, vitamin D deficiency, and high cholesterol/high triglycerides.  He follows with Dr. Weathers, nephrologist, annually for CKD.  Renal function stable.     He has difficulty waking from sleep about 4-5 hours after going to bed due to nasal congestion.  He is compliant with CPAP to treat sleep apnea.  He has a new machine ordered to replace his old CPAP machine, which may be playing a role.  He has been using Afrin, which I explained is not intended for chronic use without taking a break.  He has also tried using saline nasal spray.        Patient had colonoscopy 02/08/2021 by Dr. Erickson revealing one 1 small tubular adenomatous polyp as well as diverticulosis sigmoid, ascending and descending colon.  Repeat recommended in three years, making him due 02/2024.  He has been taking stool softeners, which has helped manage constipation.       Patient has been fully vaccinated for COVID and also tested positive for COVID 07/06/2021.  He had quite a bit of weakness, which has improved gradually.  He has not yet regained full sense of taste or smell.      He continues on Neurontin each evening for diabetic neuropathy and denies significant side effects including excessive daytime sedation.  Patient understands the risks associated with this controlled medication, including tolerance and addiction. Patient is also requesting a refill on Ketoprofen gel, which provides temporary pain relief.  Refill provided today.      Blood pressure  "at goal with home monitoring.  He reports 11-pound weight loss over past six months, which he attributes to loss of taste and smell with recent COVID.  Diabetes control at goal.      The patient's relevant past medical, surgical, and social history was reviewed in Epic.   Lab results are reviewed with the patient today. CBC unremarkable.  A1c 6.13. Vitamin D at goal with oral vitamin D. Fasting glucose 126.  Renal function stable with creatinine 1.4.  Normal liver function.  PSA reveals no evidence of prostate cancer.   Normal thyroid screen.       Objective   Vital Signs:   /57   Temp 97.9 °F (36.6 °C)   Ht 172.7 cm (68\")   Wt 117 kg (257 lb)   SpO2 96%   BMI 39.08 kg/m²       Physical Exam   Result Review :     CMP    CMP 2/22/21 7/20/21 8/31/21   Glucose 111 (A) 118 (A) 126 (A)   BUN 17 15 19   Creatinine 1.42 (A) 1.41 (A) 1.41 (A)   eGFR Non  Am 48 48 48   Sodium 141 140 140   Potassium 4.3 4.1 4.8   Chloride 107 110 105   Calcium 9.3 8.7 9.7   Albumin 3.90 3.50 4.10   Total Bilirubin 0.4  0.3   Alkaline Phosphatase 82  100   AST (SGOT) 24  19   ALT (SGPT) 16  16   (A) Abnormal value            CBC w/diff    CBC w/Diff 1/13/21 2/22/21 8/31/21   WBC 10.19 7.42 9.23   RBC 4.54 4.47 4.35   Hemoglobin 13.8 13.4 13.6   Hematocrit 41.5 40.7 40.7   MCV 91.4 91.1 93.6   MCH 30.4 30.0 31.3   MCHC 33.3 32.9 33.4   RDW 14.8 14.6 14.8   Platelets 188 189 191   Neutrophil Rel % 69.9 58.4 60.1   Lymphocyte Rel % 20.5 27.8 25.9   Monocyte Rel % 7.2 7.8 8.2   Eosinophil Rel % 2.1 5.7 5.6   Basophil Rel % 0.3 0.3 0.2           Lipid Panel    Lipid Panel 2/22/21 8/31/21   Total Cholesterol 115 (A)    Triglycerides 130    HDL Cholesterol 23 (A)    VLDL Cholesterol 24    LDL Cholesterol  68 66   LDL/HDL Ratio 2.87    (A) Abnormal value            TSH    TSH 8/31/21   TSH 1.760           A1C Last 3 Results    HGBA1C Last 3 Results 2/22/21 8/31/21   Hemoglobin A1C 6.04 (A) 6.13 (A)   (A) Abnormal value        "     PSA    PSA 8/31/21   PSA 0.170                     Assessment and Plan    Diagnoses and all orders for this visit:    1. Type 2 diabetes mellitus with stage 3a chronic kidney disease, without long-term current use of insulin (CMS/[facility]) (Primary)  -     CBC Auto Differential; Future  -     Comprehensive Metabolic Panel; Future  -     Hemoglobin A1c; Future  -     Lipid Panel; Future  -     Microalbumin / Creatinine Urine Ratio - Urine, Clean Catch; Future  -     Vitamin B12; Future    2. Hyperlipidemia associated with type 2 diabetes mellitus (CMS/[facility])  -     Comprehensive Metabolic Panel; Future  -     Lipid Panel; Future    3. Class 3 severe obesity due to excess calories with serious comorbidity and body mass index (BMI) of 40.0 to 44.9 in adult (CMS/[facility])  -     Lipid Panel; Future    4. Essential hypertension  -     Comprehensive Metabolic Panel; Future    5. Hypertriglyceridemia  -     Lipid Panel; Future    6. Coronary arteriosclerosis - followed by Dr. Farooq  -     Lipid Panel; Future    7. Vitamin D deficiency  -     Vitamin D 25 Hydroxy; Future    8. Malignant tumor of prostate (CMS/[facility]) - h/o 2001  -     PSA DIAGNOSTIC; Future    9. Hyperuricemia  -     Uric Acid; Future    10. Obstructive sleep apnea syndrome - on CPAP  -     CBC Auto Differential; Future          Refill sent for the Ketoprofen gel, which gives temporary relief of chronic pain.  Continue Neurontin for diabetic neuropathy.     Recommended he discuss the nasal congestion with sleep medicine provider when he picks up his new CPAP machine.  I recommended saline nasal spray and avoid using Afrin nasal spray chronically.  Notify me if symptoms persists once he gets his new CPAP machine.       Continue current BP medications.  Monitor BP and notify me if not consistently at goal.        Continue Crestor.  LDL at goal     Continue diet control for diabetes.  Intensify diabetic diet, exercise and weight loss to achieve weight loss.    Continue  nebulizer treatments and inhalers to manage his COPD, stable.    There is no evidence of recurrence of prostate cancer.  We will continue to follow diagnostic PSA.     Renal function is stable.  Continue to avoid NSAIDs and other nephrotoxic drugs.  Continue to follow with his nephrologist, Dr. Weathers, annually.     Continue other current medications and vitamin/mineral supplements to treat the other medical issues we addressed today.  Return in six months for follow up with fasting labs one week prior.     Scribed for Dr. Israel by Lacy Bustos [facility].     Follow Up   Return in about 6 months (around 3/7/2022) for Follow up in six months with labs one week prior., Next scheduled follow up - labs 1 week prior.  Patient was given instructions and counseling regarding his condition or for health maintenance advice. Please see specific information pulled into the AVS if appropriate.

## 2021-09-16 RX ORDER — ALLOPURINOL 100 MG/1
TABLET ORAL
Qty: 5 TABLET | Refills: 11 | OUTPATIENT
Start: 2021-09-16

## 2021-09-16 RX ORDER — CLOPIDOGREL BISULFATE 75 MG/1
75 TABLET ORAL DAILY
Qty: 30 TABLET | Refills: 3 | Status: SHIPPED | OUTPATIENT
Start: 2021-09-16 | End: 2021-12-21

## 2021-09-17 ENCOUNTER — OFFICE VISIT (OUTPATIENT)
Dept: FAMILY MEDICINE CLINIC | Facility: CLINIC | Age: 81
End: 2021-09-17

## 2021-09-17 VITALS — HEIGHT: 68 IN | BODY MASS INDEX: 38.95 KG/M2 | WEIGHT: 257 LBS

## 2021-09-17 DIAGNOSIS — Z23 NEED FOR IMMUNIZATION AGAINST INFLUENZA: ICD-10-CM

## 2021-09-17 DIAGNOSIS — E66.01 CLASS 3 SEVERE OBESITY DUE TO EXCESS CALORIES WITH SERIOUS COMORBIDITY AND BODY MASS INDEX (BMI) OF 40.0 TO 44.9 IN ADULT (HCC): Chronic | ICD-10-CM

## 2021-09-17 DIAGNOSIS — Z23 NEED FOR COVID-19 VACCINE: ICD-10-CM

## 2021-09-17 DIAGNOSIS — Z00.00 MEDICARE ANNUAL WELLNESS VISIT, SUBSEQUENT: Primary | ICD-10-CM

## 2021-09-17 PROCEDURE — G0439 PPPS, SUBSEQ VISIT: HCPCS | Performed by: INTERNAL MEDICINE

## 2021-09-17 PROCEDURE — 96160 PT-FOCUSED HLTH RISK ASSMT: CPT | Performed by: INTERNAL MEDICINE

## 2021-09-17 PROCEDURE — 1159F MED LIST DOCD IN RCRD: CPT | Performed by: INTERNAL MEDICINE

## 2021-09-17 PROCEDURE — 1126F AMNT PAIN NOTED NONE PRSNT: CPT | Performed by: INTERNAL MEDICINE

## 2021-09-17 NOTE — PATIENT INSTRUCTIONS
Medicare Wellness  Personal Prevention Plan of Service     Date of Office Visit:  2021  Encounter Provider:  Fabian Israel MD  Place of Service:  [facility]  Patient Name: Eleno Savage  :  1940    As part of the Medicare Wellness portion of your visit today, we are providing you with this personalized preventive plan of services (PPPS). This plan is based upon recommendations of the [place] Preventive Services Task Force (USPSTF) and the Advisory Committee on Immunization Practices (ACIP).    This lists the preventive care services that should be considered, and provides dates of when you are due. Items listed as completed are up-to-date and do not require any further intervention.    Health Maintenance   Topic Date Due   • ANNUAL WELLNESS VISIT  2021   • INFLUENZA VACCINE  10/01/2021   • HEMOGLOBIN A1C  2022   • DIABETIC EYE EXAM  2022   • URINE MICROALBUMIN  2022   • LIPID PANEL  2022   • COLORECTAL CANCER SCREENING  2024   • TDAP/TD VACCINES (2 - Td or Tdap) 2029   • COVID-19 Vaccine  Completed   • Pneumococcal Vaccine 65+  Completed   • ZOSTER VACCINE  Completed       No orders of the defined types were placed in this encounter.      No follow-ups on file.          Exercising to Lose Weight  Exercise is structured, repetitive physical activity to improve fitness and health. Getting regular exercise is important for everyone. It is especially important if you are overweight. Being overweight increases your risk of heart disease, stroke, diabetes, high blood pressure, and several types of cancer. Reducing your calorie intake and exercising can help you lose weight.  Exercise is usually categorized as moderate or vigorous intensity. To lose weight, most people need to do a certain amount of moderate-intensity or vigorous-intensity exercise each week.  Moderate-intensity  exercise    Moderate-intensity exercise is any activity that gets you moving enough to burn at least three times more energy (calories) than if you were sitting.  Examples of moderate exercise include:  · Walking a mile in 15 minutes.  · Doing light yard work.  · Biking at an easy pace.  Most people should get at least 150 minutes (2 hours and 30 minutes) a week of moderate-intensity exercise to maintain their body weight.  Vigorous-intensity exercise  Vigorous-intensity exercise is any activity that gets you moving enough to burn at least six times more calories than if you were sitting. When you exercise at this intensity, you should be working hard enough that you are not able to carry on a conversation.  Examples of vigorous exercise include:  · Running.  · Playing a team sport, such as football, basketball, and soccer.  · Jumping rope.  Most people should get at least 75 minutes (1 hour and 15 minutes) a week of vigorous-intensity exercise to maintain their body weight.  How can exercise affect me?  When you exercise enough to burn more calories than you eat, you lose weight. Exercise also reduces body fat and builds muscle. The more muscle you have, the more calories you burn. Exercise also:  · Improves mood.  · Reduces stress and tension.  · Improves your overall fitness, flexibility, and endurance.  · Increases bone strength.  The amount of exercise you need to lose weight depends on:  · Your age.  · The type of exercise.  · Any health conditions you have.  · Your overall physical ability.  Talk to your health care provider about how much exercise you need and what types of activities are safe for you.  What actions can I take to lose weight?  Nutrition    · Make changes to your diet as told by your health care provider or diet and nutrition specialist (dietitian). This may include:  ? Eating fewer calories.  ? Eating more protein.  ? Eating less unhealthy fats.  ? Eating a diet that includes fresh fruits  and vegetables, whole grains, low-fat dairy products, and lean protein.  ? Avoiding foods with added fat, salt, and sugar.  · Drink plenty of water while you exercise to prevent dehydration or heat stroke.    Activity  · Choose an activity that you enjoy and set realistic goals. Your health care provider can help you make an exercise plan that works for you.  · Exercise at a moderate or vigorous intensity most days of the week.  ? The intensity of exercise may vary from person to person. You can tell how intense a workout is for you by paying attention to your breathing and heartbeat. Most people will notice their breathing and heartbeat get faster with more intense exercise.  · Do resistance training twice each week, such as:  ? Push-ups.  ? Sit-ups.  ? Lifting weights.  ? Using resistance bands.  · Getting short amounts of exercise can be just as helpful as long structured periods of exercise. If you have trouble finding time to exercise, try to include exercise in your daily routine.  ? Get up, stretch, and walk around every 30 minutes throughout the day.  ? Go for a walk during your lunch break.  ? Park your car farther away from your destination.  ? If you take public transportation, get off one stop early and walk the rest of the way.  ? Make phone calls while standing up and walking around.  ? Take the stairs instead of elevators or escalators.  · Wear comfortable clothes and shoes with good support.  · Do not exercise so much that you hurt yourself, feel dizzy, or get very short of breath.  Where to find more information  · [place] Department of Health and Human Services: www.hhs.gov  · Centers for Disease Control and Prevention (CDC): www.cdc.gov  Contact a health care provider:  · Before starting a new exercise program.  · If you have questions or concerns about your weight.  · If you have a medical problem that keeps you from exercising.  Get help right away if you have any of the following while  exercising:  · Injury.  · Dizziness.  · Difficulty breathing or shortness of breath that does not go away when you stop exercising.  · Chest pain.  · Rapid heartbeat.  Summary  · Being overweight increases your risk of heart disease, stroke, diabetes, high blood pressure, and several types of cancer.  · Losing weight happens when you burn more calories than you eat.  · Reducing the amount of calories you eat in addition to getting regular moderate or vigorous exercise each week helps you lose weight.  This information is not intended to replace advice given to you by your health care provider. Make sure you discuss any questions you have with your health care provider.  Document Revised: 04/15/2021 Document Reviewed: 04/15/2021  [employer] Patient Education © 2021 [employer]      Calorie Counting for Weight Loss  Calories are units of energy. Your body needs a certain number of calories from food to keep going throughout the day. When you eat or drink more calories than your body needs, your body stores the extra calories mostly as fat. When you eat or drink fewer calories than your body needs, your body burns fat to get the energy it needs.  Calorie counting means keeping track of how many calories you eat and drink each day. Calorie counting can be helpful if you need to lose weight. If you eat fewer calories than your body needs, you should lose weight. Ask your health care provider what a healthy weight is for you.  For calorie counting to work, you will need to eat the right number of calories each day to lose a healthy amount of weight per week. A dietitian can help you figure out how many calories you need in a day and will suggest ways to reach your calorie goal.  · A healthy amount of weight to lose each week is usually 1-2 lb (0.5-0.9 kg). This usually means that your daily calorie intake should be reduced by 500-750 calories.  · Eating 1,200-1,500 calories a day can help most women lose weight.  · Eating  1,500-1,800 calories a day can help most men lose weight.  What do I need to know about calorie counting?  Work with your health care provider or dietitian to determine how many calories you should get each day. To meet your daily calorie goal, you will need to:  · Find out how many calories are in each food that you would like to eat. Try to do this before you eat.  · Decide how much of the food you plan to eat.  · Keep a food log. Do this by writing down what you ate and how many calories it had.  To successfully lose weight, it is important to balance calorie counting with a healthy lifestyle that includes regular activity.  Where do I find calorie information?    The number of calories in a food can be found on a Nutrition Facts label. If a food does not have a Nutrition Facts label, try to look up the calories online or ask your dietitian for help.  Remember that calories are listed per serving. If you choose to have more than one serving of a food, you will have to multiply the calories per serving by the number of servings you plan to eat. For example, the label on a package of bread might say that a serving size is 1 slice and that there are 90 calories in a serving. If you eat 1 slice, you will have eaten 90 calories. If you eat 2 slices, you will have eaten 180 calories.  How do I keep a food log?  After each time that you eat, record the following in your food log as soon as possible:  · What you ate. Be sure to include toppings, sauces, and other extras on the food.  · How much you ate. This can be measured in cups, ounces, or number of items.  · How many calories were in each food and drink.  · The total number of calories in the food you ate.  Keep your food log near you, such as in a pocket-sized notebook or on an kelby or website on your mobile phone. Some programs will calculate calories for you and show you how many calories you have left to meet your daily goal.  What are some portion-control  tips?  · Know how many calories are in a serving. This will help you know how many servings you can have of a certain food.  · Use a measuring cup to measure serving sizes. You could also try weighing out portions on a kitchen scale. With time, you will be able to estimate serving sizes for some foods.  · Take time to put servings of different foods on your favorite plates or in your favorite bowls and cups so you know what a serving looks like.  · Try not to eat straight from a food's packaging, such as from a bag or box. Eating straight from the package makes it hard to see how much you are eating and can lead to overeating. Put the amount you would like to eat in a cup or on a plate to make sure you are eating the right portion.  · Use smaller plates, glasses, and bowls for smaller portions and to prevent overeating.  · Try not to multitask. For example, avoid watching TV or using your computer while eating. If it is time to eat, sit down at a table and enjoy your food. This will help you recognize when you are full. It will also help you be more mindful of what and how much you are eating.  What are tips for following this plan?  Reading food labels  · Check the calorie count compared with the serving size. The serving size may be smaller than what you are used to eating.  · Check the source of the calories. Try to choose foods that are high in protein, fiber, and vitamins, and low in saturated fat, trans fat, and sodium.  Shopping  · Read nutrition labels while you shop. This will help you make healthy decisions about which foods to buy.  · Pay attention to nutrition labels for low-fat or fat-free foods. These foods sometimes have the same number of calories or more calories than the full-fat versions. They also often have added sugar, starch, or salt to make up for flavor that was removed with the fat.  · Make a grocery list of lower-calorie foods and stick to it.  Cooking  · Try to cook your favorite foods in  a healthier way. For example, try baking instead of frying.  · Use low-fat dairy products.  Meal planning  · Use more fruits and vegetables. One-half of your plate should be fruits and vegetables.  · Include lean proteins, such as chicken, turkey, and fish.  Lifestyle  Each week, aim to do one of the following:  · 150 minutes of moderate exercise, such as walking.  · 75 minutes of vigorous exercise, such as running.  General information  · Know how many calories are in the foods you eat most often. This will help you calculate calorie counts faster.  · Find a way of tracking calories that works for you. Get creative. Try different apps or programs if writing down calories does not work for you.  What foods should I eat?    · Eat nutritious foods. It is better to have a nutritious, high-calorie food, such as an avocado, than a food with few nutrients, such as a bag of potato chips.  · Use your calories on foods and drinks that will fill you up and will not leave you hungry soon after eating.  ? Examples of foods that fill you up are nuts and nut butters, vegetables, lean proteins, and high-fiber foods such as whole grains. High-fiber foods are foods with more than 5 g of fiber per serving.  · Pay attention to calories in drinks. Low-calorie drinks include water and unsweetened drinks.  The items listed above may not be a complete list of foods and beverages you can eat. Contact a dietitian for more information.  What foods should I limit?  Limit foods or drinks that are not good sources of vitamins, minerals, or protein or that are high in unhealthy fats. These include:  · Candy.  · Other sweets.  · Sodas, specialty coffee drinks, alcohol, and juice.  The items listed above may not be a complete list of foods and beverages you should avoid. Contact a dietitian for more information.  How do I count calories when eating out?  · Pay attention to portions. Often, portions are much larger when eating out. Try these tips  to keep portions smaller:  ? Consider sharing a meal instead of getting your own.  ? If you get your own meal, eat only half of it. Before you start eating, ask for a container and put half of your meal into it.  ? When available, consider ordering smaller portions from the menu instead of full portions.  · Pay attention to your food and drink choices. Knowing the way food is cooked and what is included with the meal can help you eat fewer calories.  ? If calories are listed on the menu, choose the lower-calorie options.  ? Choose dishes that include vegetables, fruits, whole grains, low-fat dairy products, and lean proteins.  ? Choose items that are boiled, broiled, grilled, or steamed. Avoid items that are buttered, battered, fried, or served with cream sauce. Items labeled as crispy are usually fried, unless stated otherwise.  ? Choose water, low-fat milk, unsweetened iced tea, or other drinks without added sugar. If you want an alcoholic beverage, choose a lower-calorie option, such as a glass of wine or light beer.  ? Ask for dressings, sauces, and syrups on the side. These are usually high in calories, so you should limit the amount you eat.  ? If you want a salad, choose a garden salad and ask for grilled meats. Avoid extra toppings such as baum, cheese, or fried items. Ask for the dressing on the side, or ask for olive oil and vinegar or lemon to use as dressing.  · Estimate how many servings of a food you are given. Knowing serving sizes will help you be aware of how much food you are eating at restaurants.  Where to find more information  · Centers for Disease Control and Prevention: www.cdc.gov  · [place] Department of Agriculture: myplate.gov  Summary  · Calorie counting means keeping track of how many calories you eat and drink each day. If you eat fewer calories than your body needs, you should lose weight.  · A healthy amount of weight to lose per week is usually 1-2 lb (0.5-0.9 kg). This usually means  reducing your daily calorie intake by 500-750 calories.  · The number of calories in a food can be found on a Nutrition Facts label. If a food does not have a Nutrition Facts label, try to look up the calories online or ask your dietitian for help.  · Use smaller plates, glasses, and bowls for smaller portions and to prevent overeating.  · Use your calories on foods and drinks that will fill you up and not leave you hungry shortly after a meal.  This information is not intended to replace advice given to you by your health care provider. Make sure you discuss any questions you have with your health care provider.  Document Revised: 01/28/2021 Document Reviewed: 01/28/2021  [employer] Patient Education © 2021 [employer]      Calorie Counting for Weight Loss  Calories are units of energy. Your body needs a certain number of calories from food to keep going throughout the day. When you eat or drink more calories than your body needs, your body stores the extra calories mostly as fat. When you eat or drink fewer calories than your body needs, your body burns fat to get the energy it needs.  Calorie counting means keeping track of how many calories you eat and drink each day. Calorie counting can be helpful if you need to lose weight. If you eat fewer calories than your body needs, you should lose weight. Ask your health care provider what a healthy weight is for you.  For calorie counting to work, you will need to eat the right number of calories each day to lose a healthy amount of weight per week. A dietitian can help you figure out how many calories you need in a day and will suggest ways to reach your calorie goal.  · A healthy amount of weight to lose each week is usually 1-2 lb (0.5-0.9 kg). This usually means that your daily calorie intake should be reduced by 500-750 calories.  · Eating 1,200-1,500 calories a day can help most women lose weight.  · Eating 1,500-1,800 calories a day can help most men lose  weight.  What do I need to know about calorie counting?  Work with your health care provider or dietitian to determine how many calories you should get each day. To meet your daily calorie goal, you will need to:  · Find out how many calories are in each food that you would like to eat. Try to do this before you eat.  · Decide how much of the food you plan to eat.  · Keep a food log. Do this by writing down what you ate and how many calories it had.  To successfully lose weight, it is important to balance calorie counting with a healthy lifestyle that includes regular activity.  Where do I find calorie information?    The number of calories in a food can be found on a Nutrition Facts label. If a food does not have a Nutrition Facts label, try to look up the calories online or ask your dietitian for help.  Remember that calories are listed per serving. If you choose to have more than one serving of a food, you will have to multiply the calories per serving by the number of servings you plan to eat. For example, the label on a package of bread might say that a serving size is 1 slice and that there are 90 calories in a serving. If you eat 1 slice, you will have eaten 90 calories. If you eat 2 slices, you will have eaten 180 calories.  How do I keep a food log?  After each time that you eat, record the following in your food log as soon as possible:  · What you ate. Be sure to include toppings, sauces, and other extras on the food.  · How much you ate. This can be measured in cups, ounces, or number of items.  · How many calories were in each food and drink.  · The total number of calories in the food you ate.  Keep your food log near you, such as in a pocket-sized notebook or on an kelby or website on your mobile phone. Some programs will calculate calories for you and show you how many calories you have left to meet your daily goal.  What are some portion-control tips?  · Know how many calories are in a serving. This  will help you know how many servings you can have of a certain food.  · Use a measuring cup to measure serving sizes. You could also try weighing out portions on a kitchen scale. With time, you will be able to estimate serving sizes for some foods.  · Take time to put servings of different foods on your favorite plates or in your favorite bowls and cups so you know what a serving looks like.  · Try not to eat straight from a food's packaging, such as from a bag or box. Eating straight from the package makes it hard to see how much you are eating and can lead to overeating. Put the amount you would like to eat in a cup or on a plate to make sure you are eating the right portion.  · Use smaller plates, glasses, and bowls for smaller portions and to prevent overeating.  · Try not to multitask. For example, avoid watching TV or using your computer while eating. If it is time to eat, sit down at a table and enjoy your food. This will help you recognize when you are full. It will also help you be more mindful of what and how much you are eating.  What are tips for following this plan?  Reading food labels  · Check the calorie count compared with the serving size. The serving size may be smaller than what you are used to eating.  · Check the source of the calories. Try to choose foods that are high in protein, fiber, and vitamins, and low in saturated fat, trans fat, and sodium.  Shopping  · Read nutrition labels while you shop. This will help you make healthy decisions about which foods to buy.  · Pay attention to nutrition labels for low-fat or fat-free foods. These foods sometimes have the same number of calories or more calories than the full-fat versions. They also often have added sugar, starch, or salt to make up for flavor that was removed with the fat.  · Make a grocery list of lower-calorie foods and stick to it.  Cooking  · Try to cook your favorite foods in a healthier way. For example, try baking instead of  frying.  · Use low-fat dairy products.  Meal planning  · Use more fruits and vegetables. One-half of your plate should be fruits and vegetables.  · Include lean proteins, such as chicken, turkey, and fish.  Lifestyle  Each week, aim to do one of the following:  · 150 minutes of moderate exercise, such as walking.  · 75 minutes of vigorous exercise, such as running.  General information  · Know how many calories are in the foods you eat most often. This will help you calculate calorie counts faster.  · Find a way of tracking calories that works for you. Get creative. Try different apps or programs if writing down calories does not work for you.  What foods should I eat?    · Eat nutritious foods. It is better to have a nutritious, high-calorie food, such as an avocado, than a food with few nutrients, such as a bag of potato chips.  · Use your calories on foods and drinks that will fill you up and will not leave you hungry soon after eating.  ? Examples of foods that fill you up are nuts and nut butters, vegetables, lean proteins, and high-fiber foods such as whole grains. High-fiber foods are foods with more than 5 g of fiber per serving.  · Pay attention to calories in drinks. Low-calorie drinks include water and unsweetened drinks.  The items listed above may not be a complete list of foods and beverages you can eat. Contact a dietitian for more information.  What foods should I limit?  Limit foods or drinks that are not good sources of vitamins, minerals, or protein or that are high in unhealthy fats. These include:  · Candy.  · Other sweets.  · Sodas, specialty coffee drinks, alcohol, and juice.  The items listed above may not be a complete list of foods and beverages you should avoid. Contact a dietitian for more information.  How do I count calories when eating out?  · Pay attention to portions. Often, portions are much larger when eating out. Try these tips to keep portions smaller:  ? Consider sharing a meal  instead of getting your own.  ? If you get your own meal, eat only half of it. Before you start eating, ask for a container and put half of your meal into it.  ? When available, consider ordering smaller portions from the menu instead of full portions.  · Pay attention to your food and drink choices. Knowing the way food is cooked and what is included with the meal can help you eat fewer calories.  ? If calories are listed on the menu, choose the lower-calorie options.  ? Choose dishes that include vegetables, fruits, whole grains, low-fat dairy products, and lean proteins.  ? Choose items that are boiled, broiled, grilled, or steamed. Avoid items that are buttered, battered, fried, or served with cream sauce. Items labeled as crispy are usually fried, unless stated otherwise.  ? Choose water, low-fat milk, unsweetened iced tea, or other drinks without added sugar. If you want an alcoholic beverage, choose a lower-calorie option, such as a glass of wine or light beer.  ? Ask for dressings, sauces, and syrups on the side. These are usually high in calories, so you should limit the amount you eat.  ? If you want a salad, choose a garden salad and ask for grilled meats. Avoid extra toppings such as baum, cheese, or fried items. Ask for the dressing on the side, or ask for olive oil and vinegar or lemon to use as dressing.  · Estimate how many servings of a food you are given. Knowing serving sizes will help you be aware of how much food you are eating at restaurants.  Where to find more information  · Centers for Disease Control and Prevention: www.cdc.gov  · U.[place]. Department of Agriculture: myplate.gov  Summary  · Calorie counting means keeping track of how many calories you eat and drink each day. If you eat fewer calories than your body needs, you should lose weight.  · A healthy amount of weight to lose per week is usually 1-2 lb (0.5-0.9 kg). This usually means reducing your daily calorie intake by 500-750  calories.  · The number of calories in a food can be found on a Nutrition Facts label. If a food does not have a Nutrition Facts label, try to look up the calories online or ask your dietitian for help.  · Use smaller plates, glasses, and bowls for smaller portions and to prevent overeating.  · Use your calories on foods and drinks that will fill you up and not leave you hungry shortly after a meal.  This information is not intended to replace advice given to you by your health care provider. Make sure you discuss any questions you have with your health care provider.  Document Revised: 01/28/2021 Document Reviewed: 01/28/2021  Elsevier Patient Education © 2021 [employer]

## 2021-09-17 NOTE — PROGRESS NOTES
You have chosen to receive care through a telephone visit. Do you consent to use a telephone visit for your medical care today? Yes    The ABCs of the Annual Wellness Visit  Subsequent Medicare Wellness Visit    Chief Complaint   Patient presents with   • Medicare Wellness-subsequent      Subjective    History of Present Illness:  Eleno Savage is a 81 y.o. male who presents for a Subsequent Medicare Wellness Visit.    The following portions of the patient's history were reviewed and   updated as appropriate:   He  has a past medical history of Achilles tendinitis, Acquired hallux rigidus, Acute otitis media, left, After cataract not obscuring vision, After-cataract with vision obscured, Allergic rhinitis, Artificial lens present, Backache, Benign prostatic hyperplasia, Bilateral pseudophakia, Chronic kidney disease, stage 3 (CMS/HCC), Chronic obstructive lung disease (CMS/HCC), Contracture of ankle and foot joint, Coronary arteriosclerosis, Degeneration of intervertebral disc of lumbosacral region, Degenerative joint disease involving multiple joints, Diabetic retinopathy (CMS/[facility]), Diverticular disease of colon, Encounter for screening for malignant neoplasm of prostate (01/03/2014), Essential hypertension, Exostosis, Fracture of coccyx (CMS/HCC), Generalized anxiety disorder, GERD (gastroesophageal reflux disease), Gout, Hearing loss, Hemorrhoids, Hyperlipidemia, Hyperlipidemia associated with type 2 diabetes mellitus (CMS/HCC) (2/26/2021), Hypertriglyceridemia, Hyperuricemia, Insomnia, Lumbar radiculopathy, Malignant tumor of prostate (CMS/HCC), Non-organic sleep disorder, Obesity, Obstructive sleep apnea syndrome, Primary localized osteoarthrosis, ankle and foot, Radiculopathy, lumbar region, Rupture of Achilles tendon, Skin sensation disturbance, Sleep disorder (7/30/2019), Type 2 diabetes mellitus (CMS/HCC), Type 2 diabetes mellitus with both eyes affected by retinopathy without macular edema, without  long-term current use of insulin (CMS/[facility]) (8/10/2020), Type 2 diabetes mellitus with stage 2 chronic kidney disease, without long-term current use of insulin (CMS/[facility]), and Vitamin D deficiency.  He does not have any pertinent problems on file.  He  has a past surgical history that includes Eye capsulotomy (Right, 04/05/2012); Carpal tunnel release; Cholecystectomy (2007); Coronary stent placement (2005); Esophagogastroduodenoscopy w/ PEG (08/28/2012); Transurethral resection of prostate (2001); Cataract extraction w/ intraocular lens implant (01/10/2012); Other surgical history (08/08/2013); Injection of Medication (05/04/2013); and Colonoscopy (N/A, 2/8/2021).  His family history includes Hypertension in his father and mother.  He  reports that he quit smoking about 36 years ago. His smoking use included cigarettes. He has a 82.00 pack-year smoking history. He has never used smokeless tobacco. He reports that he does not drink alcohol and does not use drugs.  Current Outpatient Medications   Medication Sig Dispense Refill   • Acetaminophen (TYLENOL EXTRA STRENGTH PO) Take  by mouth as needed.     • albuterol (PROVENTIL) (2.5 MG/3ML) 0.083% nebulizer solution Take 2.5 mg by nebulization every 4 (four) hours as needed.     • allopurinol (ZYLOPRIM) 100 MG tablet TAKE 1 TABLET BY MOUTH DAILY. FOR HIGH URIC ACID 90 tablet 3   • amLODIPine-benazepril (LOTREL 5-20) 5-20 MG per capsule TAKE 2 CAPSULES DAILY EQUIPP/HUMANA 180 capsule 3   • aspirin 325 MG tablet Take 325 mg by mouth daily.     • busPIRone (BUSPAR) 10 MG tablet TAKE ONE TAB TWICE DAILY 180 tablet 3   • Calcium Carbonate Antacid (TUMS ULTRA PO) Take  by mouth.     • carvedilol (COREG) 3.125 MG tablet 1 TAB(S) BY MOUTH DAILY HUMANA 90 tablet 3   • cetirizine (zyrTEC) 10 MG tablet TAKE 1 TABLET BY MOUTH DAILY AS NEEDED FOR ALLERGIES. FOR ALLERGIES 30 tablet 5   • Cholecalciferol 50 MCG (2000 UT) capsule Vitamin D3 50 mcg (2,000 unit) capsule   TAKE 1 CAPSULE  BY MOUTH ON MONDAY, WEDENSDAY, AND FRIDAY      • CloNIDine (CATAPRES) 0.1 MG tablet Take 1 tablet as needed for systolic blood pressure greater than 140. May repeat in 4 hours if needed. Maximum of 2 doses daily. 60 tablet 6   • clopidogrel (PLAVIX) 75 MG tablet TAKE 1 TABLET BY MOUTH DAILY. 30 tablet 3   • Coenzyme Q10 100 MG tablet 1-2 tablets daily     • fluticasone (FLONASE) 50 MCG/ACT nasal spray 1 SPRAY INTO EACH NOSTRIL 2 (TWO) TIMES A DAY FOR 30 DAYS. ADMINISTER 2 SPRAYS IN EACH NOSTRIL FOR EACH DOSE. 16 g 5   • fluticasone-salmeterol (ADVAIR) 250-50 MCG/DOSE DISKUS INHALE 1 PUFF 2 (TWO) TIMES A DAY. 180 each 3   • gabapentin (NEURONTIN) 600 MG tablet TAKE 1 TABLET BY MOUTH EVERY NIGHT. 30 tablet 5   • isosorbide mononitrate (IMDUR) 30 MG 24 hr tablet TAKE 1 TABLET BY MOUTH DAILY. 90 tablet 3   • Nitrostat 0.4 MG SL tablet 1 TABLET(S) SUBLINGUAL AS NEEDED FOR CHEST PAIN (MAY REPEAT EVERY 5 MINUTES BUT SEEK MEDICAL HELP IF PAIN PERSISTS AFTER 3 TABLETS) 25 tablet 11   • omeprazole (priLOSEC) 40 MG capsule TAKE ONE CAPSULE TWICE DAILY 60 capsule 5   • rosuvastatin (Crestor) 20 MG tablet Take 1 tablet by mouth Every Other Day. 45 tablet 3   • tamsulosin (FLOMAX) 0.4 MG capsule 24 hr capsule TAKE 1 CAPSULE BY MOUTH EVERY NIGHT. 90 capsule 3   • tiotropium (SPIRIVA HANDIHALER) 18 MCG per inhalation capsule Place 1 capsule into inhaler and inhale 1 (one) time daily.     • tolterodine LA (DETROL LA) 4 MG 24 hr capsule TAKE 1 CAPSULE BY MOUTH EVERY NIGHT. 90 capsule 3   • traZODone (DESYREL) 150 MG tablet TAKE 1/2 TO 1 TABLET EVERY NIGHT 90 tablet 3   • triamterene-hydrochlorothiazide (MAXZIDE-25) 37.5-25 MG per tablet TAKE 1 TABLET BY MOUTH DAILY. HUMANA 90 tablet 3   •   Ventolin  (90 Base) MCG/ACT inhaler USE 2 PUFFS FOUR TIMES A DAY AS NEEDED 18 g 6           No current facility-administered medications for this visit.     Current Outpatient Medications on File Prior to Visit   Medication Sig   •  Acetaminophen (TYLENOL EXTRA STRENGTH PO) Take  by mouth as needed.   • albuterol (PROVENTIL) (2.5 MG/3ML) 0.083% nebulizer solution Take 2.5 mg by nebulization every 4 (four) hours as needed.   • allopurinol (ZYLOPRIM) 100 MG tablet TAKE 1 TABLET BY MOUTH DAILY. FOR HIGH URIC ACID   • amLODIPine-benazepril (LOTREL 5-20) 5-20 MG per capsule TAKE 2 CAPSULES DAILY EQUIPP/HUMANA   • aspirin 325 MG tablet Take 325 mg by mouth daily.   • busPIRone (BUSPAR) 10 MG tablet TAKE ONE TAB TWICE DAILY   • Calcium Carbonate Antacid (TUMS ULTRA PO) Take  by mouth.   • carvedilol (COREG) 3.125 MG tablet 1 TAB(S) BY MOUTH DAILY HUMANA   • cetirizine (zyrTEC) 10 MG tablet TAKE 1 TABLET BY MOUTH DAILY AS NEEDED FOR ALLERGIES. FOR ALLERGIES   • Cholecalciferol 50 MCG (2000 UT) capsule Vitamin D3 50 mcg (2,000 unit) capsule   TAKE 1 CAPSULE BY MOUTH ON MONDAY, WEDENSDAY, AND FRIDAY    • CloNIDine (CATAPRES) 0.1 MG tablet Take 1 tablet as needed for systolic blood pressure greater than 140. May repeat in 4 hours if needed. Maximum of 2 doses daily.   • clopidogrel (PLAVIX) 75 MG tablet TAKE 1 TABLET BY MOUTH DAILY.   • Coenzyme Q10 100 MG tablet 1-2 tablets daily   • fluticasone (FLONASE) 50 MCG/ACT nasal spray 1 SPRAY INTO EACH NOSTRIL 2 (TWO) TIMES A DAY FOR 30 DAYS. ADMINISTER 2 SPRAYS IN EACH NOSTRIL FOR EACH DOSE.   • fluticasone-salmeterol (ADVAIR) 250-50 MCG/DOSE DISKUS INHALE 1 PUFF 2 (TWO) TIMES A DAY.   • gabapentin (NEURONTIN) 600 MG tablet TAKE 1 TABLET BY MOUTH EVERY NIGHT.   • isosorbide mononitrate (IMDUR) 30 MG 24 hr tablet TAKE 1 TABLET BY MOUTH DAILY.   • Nitrostat 0.4 MG SL tablet 1 TABLET(S) SUBLINGUAL AS NEEDED FOR CHEST PAIN (MAY REPEAT EVERY 5 MINUTES BUT SEEK MEDICAL HELP IF PAIN PERSISTS AFTER 3 TABLETS)   • omeprazole (priLOSEC) 40 MG capsule TAKE ONE CAPSULE TWICE DAILY   • rosuvastatin (Crestor) 20 MG tablet Take 1 tablet by mouth Every Other Day.   • tamsulosin (FLOMAX) 0.4 MG capsule 24 hr capsule TAKE 1  CAPSULE BY MOUTH EVERY NIGHT.   • tiotropium (SPIRIVA HANDIHALER) 18 MCG per inhalation capsule Place 1 capsule into inhaler and inhale 1 (one) time daily.   • tolterodine LA (DETROL LA) 4 MG 24 hr capsule TAKE 1 CAPSULE BY MOUTH EVERY NIGHT.   • traZODone (DESYREL) 150 MG tablet TAKE 1/2 TO 1 TABLET EVERY NIGHT   • triamterene-hydrochlorothiazide (MAXZIDE-25) 37.5-25 MG per tablet TAKE 1 TABLET BY MOUTH DAILY. **HUMANA**   • Ventolin  (90 Base) MCG/ACT inhaler USE 2 PUFFS FOUR TIMES A DAY AS NEEDED     No current facility-administered medications on file prior to visit.     He is allergic to bee venom, aminoglycosides, atorvastatin, bextra [valdecoxib], celebrex [celecoxib], contrast dye, indocin [indomethacin], mobic [meloxicam], motrin [ibuprofen], niaspan [niacin er], nsaids, and vioxx [rofecoxib]..    Compared to one year ago, the patient feels his physical   health is the same.    Compared to one year ago, the patient feels his mental   health is the same.    Recent Hospitalizations:  This patient has had a [facility] admission record on file within the last 365 days.    Current Medical Providers:  Patient Care Team:  Fabian Israel MD as PCP - General    Outpatient Medications Prior to Visit   Medication Sig Dispense Refill   • Acetaminophen (TYLENOL EXTRA STRENGTH PO) Take  by mouth as needed.     • albuterol (PROVENTIL) (2.5 MG/3ML) 0.083% nebulizer solution Take 2.5 mg by nebulization every 4 (four) hours as needed.     • allopurinol (ZYLOPRIM) 100 MG tablet TAKE 1 TABLET BY MOUTH DAILY. FOR HIGH URIC ACID 90 tablet 3   • amLODIPine-benazepril (LOTREL 5-20) 5-20 MG per capsule TAKE 2 CAPSULES DAILY EQUIPP/HUMANA 180 capsule 3   • aspirin 325 MG tablet Take 325 mg by mouth daily.     • busPIRone (BUSPAR) 10 MG tablet TAKE ONE TAB TWICE DAILY 180 tablet 3   • Calcium Carbonate Antacid (TUMS ULTRA PO) Take  by mouth.     • carvedilol (COREG) 3.125 MG tablet 1 TAB(S) BY MOUTH DAILY HUMANA 90  tablet 3   • cetirizine (zyrTEC) 10 MG tablet TAKE 1 TABLET BY MOUTH DAILY AS NEEDED FOR ALLERGIES. FOR ALLERGIES 30 tablet 5   • Cholecalciferol 50 MCG (2000 UT) capsule Vitamin D3 50 mcg (2,000 unit) capsule   TAKE 1 CAPSULE BY MOUTH ON MONDAY, WEDENSDAY, AND FRIDAY      • CloNIDine (CATAPRES) 0.1 MG tablet Take 1 tablet as needed for systolic blood pressure greater than 140. May repeat in 4 hours if needed. Maximum of 2 doses daily. 60 tablet 6   • clopidogrel (PLAVIX) 75 MG tablet TAKE 1 TABLET BY MOUTH DAILY. 30 tablet 3   • Coenzyme Q10 100 MG tablet 1-2 tablets daily     • fluticasone (FLONASE) 50 MCG/ACT nasal spray 1 SPRAY INTO EACH NOSTRIL 2 (TWO) TIMES A DAY FOR 30 DAYS. ADMINISTER 2 SPRAYS IN EACH NOSTRIL FOR EACH DOSE. 16 g 5   • fluticasone-salmeterol (ADVAIR) 250-50 MCG/DOSE DISKUS INHALE 1 PUFF 2 (TWO) TIMES A DAY. 180 each 3   • gabapentin (NEURONTIN) 600 MG tablet TAKE 1 TABLET BY MOUTH EVERY NIGHT. 30 tablet 5   • isosorbide mononitrate (IMDUR) 30 MG 24 hr tablet TAKE 1 TABLET BY MOUTH DAILY. 90 tablet 3   • Nitrostat 0.4 MG SL tablet 1 TABLET(S) SUBLINGUAL AS NEEDED FOR CHEST PAIN (MAY REPEAT EVERY 5 MINUTES BUT SEEK MEDICAL HELP IF PAIN PERSISTS AFTER 3 TABLETS) 25 tablet 11   • omeprazole (priLOSEC) 40 MG capsule TAKE ONE CAPSULE TWICE DAILY 60 capsule 5   • rosuvastatin (Crestor) 20 MG tablet Take 1 tablet by mouth Every Other Day. 45 tablet 3   • tamsulosin (FLOMAX) 0.4 MG capsule 24 hr capsule TAKE 1 CAPSULE BY MOUTH EVERY NIGHT. 90 capsule 3   • tiotropium (SPIRIVA HANDIHALER) 18 MCG per inhalation capsule Place 1 capsule into inhaler and inhale 1 (one) time daily.     • tolterodine LA (DETROL LA) 4 MG 24 hr capsule TAKE 1 CAPSULE BY MOUTH EVERY NIGHT. 90 capsule 3   • traZODone (DESYREL) 150 MG tablet TAKE 1/2 TO 1 TABLET EVERY NIGHT 90 tablet 3   • triamterene-hydrochlorothiazide (MAXZIDE-25) 37.5-25 MG per tablet TAKE 1 TABLET BY MOUTH DAILY. HUMANA 90 tablet 3   • Ventolin  (90  Base) MCG/ACT inhaler USE 2 PUFFS FOUR TIMES A DAY AS NEEDED 18 g 6     No facility-administered medications prior to visit.       No opioid medication identified on active medication list. I have reviewed chart for other potential  high risk medication/s and harmful drug interactions in the elderly.          Aspirin is on active medication list. Aspirin use is indicated based on review of current medical condition/s. Pros and cons of this therapy have been discussed today. Benefits of this medication outweigh potential harm.  Patient has been encouraged to continue taking this medication.  .      Patient Active Problem List   Diagnosis   • Pseudophakia   • Vitamin D deficiency   • Type 2 diabetes mellitus with stage 3 chronic kidney disease, without long-term current use of insulin (CMS/[facility])   • Skin sensation disturbance   • Rupture of Achilles tendon   • Radiculopathy, lumbar region   • Primary localized osteoarthrosis, ankle and foot   • Obstructive sleep apnea syndrome - on CPAP   • Obesity   • Non-organic sleep disorder   • Malignant tumor of prostate (CMS/[facility]) - h/o 2001   • Lumbar radiculopathy   • Insomnia   • Hyperuricemia   • Hypertriglyceridemia   • Hemorrhoids   • Hearing loss   • Gout   • GERD (gastroesophageal reflux disease)   • Generalized anxiety disorder   • Fracture of coccyx (CMS/[facility])   • Exostosis   • Essential hypertension   • Encounter for screening for malignant neoplasm of prostate   • Diverticular disease of colon   • Diabetic retinopathy (CMS/[facility]) - Dr. Jansen   • Degenerative joint disease involving multiple joints   • Degeneration of intervertebral disc of lumbosacral region   • Coronary arteriosclerosis - followed by Dr. Farooq   • Contracture of ankle and foot joint   • Chronic obstructive lung disease (CMS/[facility])   • Chronic kidney disease, stage 3 (CMS/[facility])   • Bilateral pseudophakia   • Benign prostatic hyperplasia   • Backache   • Allergic rhinitis   • Sleep disorder -using  "melatonin, no longer needs trazodone.   • Type 2 diabetes mellitus with both eyes affected by retinopathy without macular edema, without long-term current use of insulin (CMS/[facility])   • Abnormal cardiovascular function study   • Abnormal glucose level   • Acute-on-chronic renal failure (CMS/HCC)   • Edema of lower extremity   • Post PTCA   • Rectal bleeding   • Hx of adenomatous colonic polyps   • Diverticulosis   • Hyperlipidemia associated with type 2 diabetes mellitus (CMS/HCC)   • Chronic idiopathic constipation     Advance Care Planning  Advance Directive is not on file.  ACP discussion was held with the patient during this visit. Patient does not have an advance directive, information provided.          Objective    Vitals:    21 1615   Weight: 117 kg (257 lb)   Height: 172.7 cm (68\")   PainSc:   5   PainLoc: Generalized     BMI Readings from Last 1 Encounters:   21 39.08 kg/m²   BMI is above normal parameters. Recommendations include: exercise counseling and nutrition counseling    No blood pressure obtained today with this telephone visit due to coronavirus pandemic.    Does the patient have evidence of cognitive impairment? No    Physical Exam  Lab Results   Component Value Date    LDL 66 2021    HGBA1C 6.13 (H) 2021            HEALTH RISK ASSESSMENT    Smoking Status:  Social History     Tobacco Use   Smoking Status Former Smoker   • Packs/day: 2.00   • Years: 41.00   • Pack years: 82.00   • Types: Cigarettes   • Quit date: 1985   • Years since quittin.7   Smokeless Tobacco Never Used     Alcohol Consumption:  Social History     Substance and Sexual Activity   Alcohol Use No     Fall Risk Screen:    CARLINEADI Fall Risk Assessment was completed, and patient is at LOW risk for falls.Assessment completed on:2021    Depression Screening:  PHQ-2/PHQ-9 Depression Screening 2021   Little interest or pleasure in doing things 0   Feeling down, depressed, or hopeless 0   Total " Score 0       Health Habits and Functional and Cognitive Screening:  Functional & Cognitive Status 9/17/2021   Do you have difficulty preparing food and eating? No   Do you have difficulty bathing yourself, getting dressed or grooming yourself? No   Do you have difficulty using the toilet? No   Do you have difficulty moving around from place to place? Yes   Do you have trouble with steps or getting out of a bed or a chair? Yes   Current Diet Well Balanced Diet   Dental Exam Up to date   Eye Exam Up to date   Exercise (times per week) 4 times per week   Current Exercises Include Yard Work   Current Exercise Activities Include -   Do you need help using the phone?  No   Are you deaf or do you have serious difficulty hearing?  Yes   Do you need help with transportation? No   Do you need help shopping? No   Do you need help preparing meals?  No   Do you need help with housework?  No   Do you need help with laundry? No   Do you need help taking your medications? Yes   Do you need help managing money? No   Do you ever drive or ride in a car without wearing a seat belt? No   Have you felt unusual stress, anger or loneliness in the last month? No   Who do you live with? Spouse   If you need help, do you have trouble finding someone available to you? No   Have you been bothered in the last four weeks by sexual problems? No   Do you have difficulty concentrating, remembering or making decisions? No       Age-appropriate Screening Schedule:  Refer to the list below for future screening recommendations based on patient's age, sex and/or medical conditions. Orders for these recommended tests are listed in the plan section. The patient has been provided with a written plan.    Health Maintenance   Topic Date Due   • INFLUENZA VACCINE  10/01/2021   • HEMOGLOBIN A1C  02/28/2022   • DIABETIC EYE EXAM  04/05/2022   • URINE MICROALBUMIN  07/20/2022   • LIPID PANEL  08/31/2022   • TDAP/TD VACCINES (2 - Td or Tdap) 07/30/2029   • ZOSTER  VACCINE  Completed              Assessment/Plan   CMS Preventative Services Quick Reference  Risk Factors Identified During Encounter  Chronic Pain   Immunizations Discussed/Encouraged (specific Immunizations; Influenza and COVID19  Inactivity/Sedentary  Obesity/Overweight   The above risks/problems have been discussed with the patient.  The patient is encouraged to get his Covid booster as soon as available, and he is in agreement.  The patient agrees to get his influenza vaccination this fall  Follow up actions/plans if indicated are seen below in the Assessment/Plan Section.  Pertinent information has been shared with the patient in the After Visit Summary.    Diagnoses and all orders for this visit:    1. Medicare annual wellness visit, subsequent (Primary)    2. Need for COVID-19 vaccine    3. Need for immunization against influenza    4. Class 3 severe obesity due to excess calories with serious comorbidity and body mass index (BMI) of 40.0 to 44.9 in adult (CMS/[facility])        Follow Up:   Return in about 1 year (around 9/17/2022) for Medicare Wellness.     An After Visit Summary and PPPS were made available to the patient.        I spent 8 minutes caring for Eleno on this date of service. This time includes time spent by me in the following activities:preparing for the visit, counseling and educating the patient/family/caregiver, documenting information in the medical record and care coordination

## 2021-10-29 RX ORDER — ISOSORBIDE MONONITRATE 30 MG/1
TABLET, EXTENDED RELEASE ORAL
Qty: 30 TABLET | Refills: 5 | Status: SHIPPED | OUTPATIENT
Start: 2021-10-29 | End: 2022-05-02

## 2021-11-01 RX ORDER — ACETAMINOPHEN 160 MG
TABLET,DISINTEGRATING ORAL
Qty: 12 CAPSULE | Refills: 0 | OUTPATIENT
Start: 2021-11-01

## 2021-11-01 RX ORDER — CHOLECALCIFEROL (VITAMIN D3) 50 MCG
TABLET ORAL
Qty: 12 TABLET | Refills: 11 | Status: SHIPPED | OUTPATIENT
Start: 2021-11-01 | End: 2022-08-30 | Stop reason: SDUPTHER

## 2021-12-21 RX ORDER — CLOPIDOGREL BISULFATE 75 MG/1
75 TABLET ORAL DAILY
Qty: 30 TABLET | Refills: 2 | Status: SHIPPED | OUTPATIENT
Start: 2021-12-21 | End: 2022-03-21

## 2021-12-21 RX ORDER — ROSUVASTATIN CALCIUM 20 MG/1
20 TABLET, COATED ORAL EVERY OTHER DAY
Qty: 15 TABLET | Refills: 2 | Status: SHIPPED | OUTPATIENT
Start: 2021-12-21 | End: 2022-03-21

## 2022-01-01 NOTE — TELEPHONE ENCOUNTER
I don't see where we have refilled this in over a year. TP   Vaccine Information Statement(s) or the Emergency Use Authorization was given today. This has been reviewed, questions answered, and verbal consent given by Parent for injection(s) and administration of Haemophilus Influenza type b (Hib), Pediarix, Pneumococcal Conjugate (PCV13) and Rotavirus.        Patient tolerated without incident. See immunization grid for documentation.

## 2022-01-20 RX ORDER — NITROGLYCERIN 0.4 MG/1
TABLET SUBLINGUAL
Qty: 25 TABLET | Refills: 11 | Status: SHIPPED | OUTPATIENT
Start: 2022-01-20 | End: 2023-01-25

## 2022-01-21 DIAGNOSIS — E11.42 DIABETIC PERIPHERAL NEUROPATHY: ICD-10-CM

## 2022-01-21 RX ORDER — GABAPENTIN 600 MG/1
600 TABLET ORAL NIGHTLY
Qty: 30 TABLET | Refills: 2 | Status: SHIPPED | OUTPATIENT
Start: 2022-01-21 | End: 2022-03-15 | Stop reason: SDUPTHER

## 2022-02-09 RX ORDER — ALBUTEROL SULFATE 90 UG/1
AEROSOL, METERED RESPIRATORY (INHALATION)
Qty: 18 G | Refills: 6 | Status: SHIPPED | OUTPATIENT
Start: 2022-02-09

## 2022-02-22 RX ORDER — OMEPRAZOLE 40 MG/1
CAPSULE, DELAYED RELEASE ORAL
Qty: 60 CAPSULE | Refills: 5 | Status: SHIPPED | OUTPATIENT
Start: 2022-02-22 | End: 2022-08-29

## 2022-02-22 RX ORDER — CETIRIZINE HYDROCHLORIDE 10 MG/1
10 TABLET ORAL DAILY PRN
Qty: 30 TABLET | Refills: 5 | Status: SHIPPED | OUTPATIENT
Start: 2022-02-22 | End: 2022-08-29

## 2022-03-09 ENCOUNTER — LAB (OUTPATIENT)
Dept: LAB | Facility: OTHER | Age: 82
End: 2022-03-09

## 2022-03-09 DIAGNOSIS — I10 ESSENTIAL HYPERTENSION: Chronic | ICD-10-CM

## 2022-03-09 DIAGNOSIS — C61 MALIGNANT TUMOR OF PROSTATE: Chronic | ICD-10-CM

## 2022-03-09 DIAGNOSIS — E55.9 VITAMIN D DEFICIENCY: Chronic | ICD-10-CM

## 2022-03-09 DIAGNOSIS — G47.33 OBSTRUCTIVE SLEEP APNEA SYNDROME: Chronic | ICD-10-CM

## 2022-03-09 DIAGNOSIS — E78.1 HYPERTRIGLYCERIDEMIA: Chronic | ICD-10-CM

## 2022-03-09 DIAGNOSIS — N18.31 TYPE 2 DIABETES MELLITUS WITH STAGE 3A CHRONIC KIDNEY DISEASE, WITHOUT LONG-TERM CURRENT USE OF INSULIN: Chronic | ICD-10-CM

## 2022-03-09 DIAGNOSIS — E66.01 CLASS 3 SEVERE OBESITY DUE TO EXCESS CALORIES WITH SERIOUS COMORBIDITY AND BODY MASS INDEX (BMI) OF 40.0 TO 44.9 IN ADULT: ICD-10-CM

## 2022-03-09 DIAGNOSIS — I25.10 CORONARY ARTERIOSCLEROSIS: Chronic | ICD-10-CM

## 2022-03-09 DIAGNOSIS — E11.22 TYPE 2 DIABETES MELLITUS WITH STAGE 3A CHRONIC KIDNEY DISEASE, WITHOUT LONG-TERM CURRENT USE OF INSULIN: Chronic | ICD-10-CM

## 2022-03-09 DIAGNOSIS — E78.5 HYPERLIPIDEMIA ASSOCIATED WITH TYPE 2 DIABETES MELLITUS: Chronic | ICD-10-CM

## 2022-03-09 DIAGNOSIS — E11.69 HYPERLIPIDEMIA ASSOCIATED WITH TYPE 2 DIABETES MELLITUS: Chronic | ICD-10-CM

## 2022-03-09 DIAGNOSIS — E79.0 HYPERURICEMIA: Chronic | ICD-10-CM

## 2022-03-09 LAB
25(OH)D3 SERPL-MCNC: 48.2 NG/ML (ref 30–100)
ALBUMIN SERPL-MCNC: 3.9 G/DL (ref 3.5–5)
ALBUMIN UR-MCNC: <1.2 MG/DL
ALBUMIN/GLOB SERPL: 1.4 G/DL (ref 1.1–1.8)
ALP SERPL-CCNC: 104 U/L (ref 38–126)
ALT SERPL W P-5'-P-CCNC: 23 U/L
ANION GAP SERPL CALCULATED.3IONS-SCNC: 6 MMOL/L (ref 5–15)
AST SERPL-CCNC: 25 U/L (ref 17–59)
BASOPHILS # BLD AUTO: 0.03 10*3/MM3 (ref 0–0.2)
BASOPHILS NFR BLD AUTO: 0.4 % (ref 0–1.5)
BILIRUB SERPL-MCNC: 0.3 MG/DL (ref 0.2–1.3)
BUN SERPL-MCNC: 19 MG/DL (ref 7–23)
BUN/CREAT SERPL: 14.2 (ref 7–25)
CALCIUM SPEC-SCNC: 8.8 MG/DL (ref 8.4–10.2)
CHLORIDE SERPL-SCNC: 106 MMOL/L (ref 101–112)
CHOLEST SERPL-MCNC: 102 MG/DL (ref 150–200)
CO2 SERPL-SCNC: 30 MMOL/L (ref 22–30)
CREAT SERPL-MCNC: 1.34 MG/DL (ref 0.7–1.3)
CREAT UR-MCNC: 60.7 MG/DL
DEPRECATED RDW RBC AUTO: 48.2 FL (ref 37–54)
EGFRCR SERPLBLD CKD-EPI 2021: 52.2 ML/MIN/1.73
EOSINOPHIL # BLD AUTO: 0.36 10*3/MM3 (ref 0–0.4)
EOSINOPHIL NFR BLD AUTO: 5.1 % (ref 0.3–6.2)
ERYTHROCYTE [DISTWIDTH] IN BLOOD BY AUTOMATED COUNT: 14.7 % (ref 12.3–15.4)
GLOBULIN UR ELPH-MCNC: 2.8 GM/DL (ref 2.3–3.5)
GLUCOSE SERPL-MCNC: 129 MG/DL (ref 70–99)
HBA1C MFR BLD: 6.1 % (ref 4.8–5.6)
HCT VFR BLD AUTO: 39.3 % (ref 37.5–51)
HDLC SERPL-MCNC: 28 MG/DL (ref 40–59)
HGB BLD-MCNC: 13.2 G/DL (ref 13–17.7)
LDLC SERPL CALC-MCNC: 54 MG/DL
LDLC/HDLC SERPL: 1.89 {RATIO} (ref 0–3.55)
LYMPHOCYTES # BLD AUTO: 1.94 10*3/MM3 (ref 0.7–3.1)
LYMPHOCYTES NFR BLD AUTO: 27.3 % (ref 19.6–45.3)
MCH RBC QN AUTO: 31.1 PG (ref 26.6–33)
MCHC RBC AUTO-ENTMCNC: 33.6 G/DL (ref 31.5–35.7)
MCV RBC AUTO: 92.5 FL (ref 79–97)
MICROALBUMIN/CREAT UR: NORMAL MG/G{CREAT}
MONOCYTES # BLD AUTO: 0.59 10*3/MM3 (ref 0.1–0.9)
MONOCYTES NFR BLD AUTO: 8.3 % (ref 5–12)
NEUTROPHILS NFR BLD AUTO: 4.19 10*3/MM3 (ref 1.7–7)
NEUTROPHILS NFR BLD AUTO: 58.9 % (ref 42.7–76)
PLATELET # BLD AUTO: 184 10*3/MM3 (ref 140–450)
PMV BLD AUTO: 11.2 FL (ref 6–12)
POTASSIUM SERPL-SCNC: 4.1 MMOL/L (ref 3.4–5)
PROT SERPL-MCNC: 6.7 G/DL (ref 6.3–8.6)
PSA SERPL-MCNC: 0.11 NG/ML (ref 0–4)
RBC # BLD AUTO: 4.25 10*6/MM3 (ref 4.14–5.8)
SODIUM SERPL-SCNC: 142 MMOL/L (ref 137–145)
TRIGL SERPL-MCNC: 105 MG/DL
URATE SERPL-MCNC: 6.4 MG/DL (ref 3.5–8.5)
VIT B12 BLD-MCNC: 225 PG/ML (ref 211–946)
VLDLC SERPL-MCNC: 20 MG/DL (ref 5–40)
WBC NRBC COR # BLD: 7.11 10*3/MM3 (ref 3.4–10.8)

## 2022-03-09 PROCEDURE — 82607 VITAMIN B-12: CPT | Performed by: INTERNAL MEDICINE

## 2022-03-09 PROCEDURE — 82043 UR ALBUMIN QUANTITATIVE: CPT | Performed by: INTERNAL MEDICINE

## 2022-03-09 PROCEDURE — 85025 COMPLETE CBC W/AUTO DIFF WBC: CPT | Performed by: INTERNAL MEDICINE

## 2022-03-09 PROCEDURE — 36415 COLL VENOUS BLD VENIPUNCTURE: CPT | Performed by: INTERNAL MEDICINE

## 2022-03-09 PROCEDURE — 84153 ASSAY OF PSA TOTAL: CPT | Performed by: INTERNAL MEDICINE

## 2022-03-09 PROCEDURE — 80061 LIPID PANEL: CPT | Performed by: INTERNAL MEDICINE

## 2022-03-09 PROCEDURE — 83036 HEMOGLOBIN GLYCOSYLATED A1C: CPT | Performed by: INTERNAL MEDICINE

## 2022-03-09 PROCEDURE — 82570 ASSAY OF URINE CREATININE: CPT | Performed by: INTERNAL MEDICINE

## 2022-03-09 PROCEDURE — 80053 COMPREHEN METABOLIC PANEL: CPT | Performed by: INTERNAL MEDICINE

## 2022-03-09 PROCEDURE — 84550 ASSAY OF BLOOD/URIC ACID: CPT | Performed by: INTERNAL MEDICINE

## 2022-03-09 PROCEDURE — 82306 VITAMIN D 25 HYDROXY: CPT | Performed by: INTERNAL MEDICINE

## 2022-03-15 ENCOUNTER — OFFICE VISIT (OUTPATIENT)
Dept: FAMILY MEDICINE CLINIC | Facility: CLINIC | Age: 82
End: 2022-03-15

## 2022-03-15 VITALS
DIASTOLIC BLOOD PRESSURE: 58 MMHG | TEMPERATURE: 97 F | BODY MASS INDEX: 42.13 KG/M2 | OXYGEN SATURATION: 97 % | SYSTOLIC BLOOD PRESSURE: 118 MMHG | HEIGHT: 68 IN | WEIGHT: 278 LBS | HEART RATE: 63 BPM

## 2022-03-15 DIAGNOSIS — E11.319 TYPE 2 DIABETES MELLITUS WITH BOTH EYES AFFECTED BY RETINOPATHY WITHOUT MACULAR EDEMA, WITHOUT LONG-TERM CURRENT USE OF INSULIN, UNSPECIFIED RETINOPATHY SEVERITY: Primary | Chronic | ICD-10-CM

## 2022-03-15 DIAGNOSIS — I25.10 CORONARY ARTERIOSCLEROSIS: Chronic | ICD-10-CM

## 2022-03-15 DIAGNOSIS — E53.8 VITAMIN B12 DEFICIENCY: Chronic | ICD-10-CM

## 2022-03-15 DIAGNOSIS — E11.42 DIABETIC PERIPHERAL NEUROPATHY: ICD-10-CM

## 2022-03-15 DIAGNOSIS — E78.1 HYPERTRIGLYCERIDEMIA: Chronic | ICD-10-CM

## 2022-03-15 DIAGNOSIS — F41.1 GENERALIZED ANXIETY DISORDER: Chronic | ICD-10-CM

## 2022-03-15 DIAGNOSIS — E11.69 HYPERLIPIDEMIA ASSOCIATED WITH TYPE 2 DIABETES MELLITUS: Chronic | ICD-10-CM

## 2022-03-15 DIAGNOSIS — N40.1 BENIGN PROSTATIC HYPERPLASIA WITH NOCTURIA: Chronic | ICD-10-CM

## 2022-03-15 DIAGNOSIS — E55.9 VITAMIN D DEFICIENCY: Chronic | ICD-10-CM

## 2022-03-15 DIAGNOSIS — E78.5 HYPERLIPIDEMIA ASSOCIATED WITH TYPE 2 DIABETES MELLITUS: Chronic | ICD-10-CM

## 2022-03-15 DIAGNOSIS — I10 ESSENTIAL HYPERTENSION: Chronic | ICD-10-CM

## 2022-03-15 DIAGNOSIS — K21.9 GASTROESOPHAGEAL REFLUX DISEASE, UNSPECIFIED WHETHER ESOPHAGITIS PRESENT: Chronic | ICD-10-CM

## 2022-03-15 DIAGNOSIS — C61 MALIGNANT TUMOR OF PROSTATE: Chronic | ICD-10-CM

## 2022-03-15 DIAGNOSIS — M10.30 GOUT DUE TO RENAL IMPAIRMENT, UNSPECIFIED CHRONICITY, UNSPECIFIED SITE: Chronic | ICD-10-CM

## 2022-03-15 DIAGNOSIS — E66.01 CLASS 3 SEVERE OBESITY DUE TO EXCESS CALORIES WITH SERIOUS COMORBIDITY AND BODY MASS INDEX (BMI) OF 40.0 TO 44.9 IN ADULT: Chronic | ICD-10-CM

## 2022-03-15 DIAGNOSIS — N18.31 STAGE 3A CHRONIC KIDNEY DISEASE: Chronic | ICD-10-CM

## 2022-03-15 DIAGNOSIS — R35.1 BENIGN PROSTATIC HYPERPLASIA WITH NOCTURIA: Chronic | ICD-10-CM

## 2022-03-15 DIAGNOSIS — N18.31 TYPE 2 DIABETES MELLITUS WITH STAGE 3A CHRONIC KIDNEY DISEASE, WITHOUT LONG-TERM CURRENT USE OF INSULIN: Chronic | ICD-10-CM

## 2022-03-15 DIAGNOSIS — E11.22 TYPE 2 DIABETES MELLITUS WITH STAGE 3A CHRONIC KIDNEY DISEASE, WITHOUT LONG-TERM CURRENT USE OF INSULIN: Chronic | ICD-10-CM

## 2022-03-15 DIAGNOSIS — J43.2 CENTRILOBULAR EMPHYSEMA: Chronic | ICD-10-CM

## 2022-03-15 DIAGNOSIS — E11.319 DIABETIC RETINOPATHY OF BOTH EYES WITHOUT MACULAR EDEMA ASSOCIATED WITH TYPE 2 DIABETES MELLITUS, UNSPECIFIED RETINOPATHY SEVERITY: Chronic | ICD-10-CM

## 2022-03-15 PROBLEM — Z98.61 CORONARY ANGIOPLASTY STATUS: Status: ACTIVE | Noted: 2018-03-30

## 2022-03-15 PROBLEM — R94.39 ABNORMAL CARDIOVASCULAR STRESS TEST: Status: ACTIVE | Noted: 2018-06-12

## 2022-03-15 PROBLEM — R00.2 PALPITATIONS: Status: ACTIVE | Noted: 2017-03-31

## 2022-03-15 PROBLEM — R06.09 DYSPNEA ON EXERTION: Status: ACTIVE | Noted: 2018-03-30

## 2022-03-15 PROCEDURE — 99214 OFFICE O/P EST MOD 30 MIN: CPT | Performed by: INTERNAL MEDICINE

## 2022-03-15 RX ORDER — ESCITALOPRAM OXALATE 5 MG/1
5 TABLET ORAL EVERY MORNING
Qty: 90 TABLET | Refills: 3 | Status: SHIPPED | OUTPATIENT
Start: 2022-03-15 | End: 2023-02-23

## 2022-03-15 RX ORDER — GABAPENTIN 600 MG/1
600 TABLET ORAL NIGHTLY
Qty: 30 TABLET | Refills: 5 | Status: SHIPPED | OUTPATIENT
Start: 2022-03-15 | End: 2022-09-23 | Stop reason: SDUPTHER

## 2022-03-15 RX ORDER — CHOLECALCIFEROL (VITAMIN D3) 125 MCG
500 CAPSULE ORAL EVERY OTHER DAY
Qty: 15 TABLET | Refills: 11 | Status: SHIPPED | OUTPATIENT
Start: 2022-03-15

## 2022-03-15 NOTE — PROGRESS NOTES
Chief Complaint  Follow-up (6 month), Insomnia (Melatonin or Trazadone doesn't help ), and Med Refill (Gabapentin)    Subjective          History of Present Illness     Eleno Savage presents to the office for 6-month follow up on type 2 diabetes mellitus, stage 2 chronic kidney disease, obesity, COPD, GERD, vitamin D deficiency, and high cholesterol/high triglycerides. Patient has a history of prostate cancer with no evidence of recurrence.  PSA remains undetectable. He follows with Dr. Weathers, nephrologist, annually for CKD.  He reports nocturia 1-2 times nightly, but denies significant BPH symptoms.  Dr. Mesa is his cardiologist. He has occasional left-sided chest pain.  He is scheduled to follow up with Dr. Mesa, cardiologist, next week. Patient reports stable COPD symptoms.  He has occasional breakthrough symptoms despite taking Prilosec 40 mg daily, although, adding OTC Tums gives quick improvement.      Patient is having difficulty falling asleep at night.  He goes to be around 11:00 p..m., but is frequently laying in bed until 2 to 3:00 a.m. before falling asleep.  He attributes this in part to feeling anxious and overwhelmed day and night with the world events.  He takes Trazodone at bedtime and melatonin 15 mg nightly without improvement.   He does admit to watching TV up until time he is trying to get to sleep.  He tried Lunesta in the past, but stopped when no longer covered by insurance.       Patient had colonoscopy 02/08/2021 by Dr. Erickson revealing one 1 small tubular adenomatous polyp as well as diverticulosis sigmoid, ascending and descending colon.  Repeat recommended in three years, making him due 02/2024.      He continues on Neurontin each evening for diabetic neuropathy and denies significant side effects including excessive daytime sedation.  Patient understands the risks associated with this controlled medication, including tolerance and addiction   Patient sees Dr. Jansen for annual  "diabetic eye exam.     Weight is up 10 pounds in the past year.   Despite weight gain, he continues to have excellent diabetes control.      BP and HR at goal.     The patient's relevant past medical, surgical, and social history was reviewed in Epic.   Lab results are reviewed with the patient today.  CBC unremarkable. Fasting glucose 129.  A1c 6.1.  Renal function slightly improved. Normal liver function.  Vitamin D at goal with oral supplement.  B-12 low at 225.      Objective   Vital Signs:   /58 (Cuff Size: Large Adult)   Pulse 63   Temp 97 °F (36.1 °C)   Ht 172.7 cm (68\")   Wt 126 kg (278 lb)   SpO2 97%   BMI 42.27 kg/m²       Physical Exam  Vitals reviewed.   Constitutional:       General: He is not in acute distress.     Appearance: He is well-developed.      Comments: Pleasant male, morbidly obese.  Accompanied by his wife.    HENT:      Head: Normocephalic and atraumatic.      Nose:      Right Sinus: No maxillary sinus tenderness or frontal sinus tenderness.      Left Sinus: No maxillary sinus tenderness or frontal sinus tenderness.      Mouth/Throat:      Mouth: No oral lesions.      Pharynx: Uvula midline.      Tonsils: No tonsillar exudate.      Comments: Prominent Apple's apple.   Eyes:      Conjunctiva/sclera: Conjunctivae normal.      Pupils: Pupils are equal, round, and reactive to light.   Neck:      Thyroid: No thyroid mass or thyromegaly.      Vascular: No carotid bruit or JVD.      Trachea: Trachea normal. No tracheal deviation.   Cardiovascular:      Rate and Rhythm: Normal rate and regular rhythm.  No extrasystoles are present.     Chest Wall: PMI is not displaced.      Heart sounds: Normal heart sounds. No murmur heard.  Pulmonary:      Effort: Pulmonary effort is normal. No accessory muscle usage or respiratory distress.      Breath sounds: Normal breath sounds. No decreased breath sounds, wheezing, rhonchi or rales.   Abdominal:      General: Bowel sounds are normal. There is no " distension.      Palpations: Abdomen is soft.      Tenderness: There is no abdominal tenderness.      Comments: Obese abdomen limits exam.    Musculoskeletal:      Cervical back: Neck supple.   Feet:      Comments: 1+ edema bilateral ankles.   Lymphadenopathy:      Cervical: No cervical adenopathy.   Skin:     General: Skin is warm and dry.      Findings: No rash.      Nails: There is no clubbing.   Neurological:      Mental Status: He is alert and oriented to person, place, and time.      Cranial Nerves: No cranial nerve deficit.      Coordination: Coordination normal.   Psychiatric:         Speech: Speech normal.         Behavior: Behavior normal.         Thought Content: Thought content normal.         Judgment: Judgment normal.            Result Review :     CMP    CMP 7/20/21 8/31/21 3/9/22   Glucose 118 (A) 126 (A) 129 (A)   BUN 15 19 19   Creatinine 1.41 (A) 1.41 (A) 1.34 (A)   eGFR Non  Am 48 48    Sodium 140 140 142   Potassium 4.1 4.8 4.1   Chloride 110 105 106   Calcium 8.7 9.7 8.8   Albumin 3.50 4.10 3.90   Total Bilirubin  0.3 0.3   Alkaline Phosphatase  100 104   AST (SGOT)  19 25   ALT (SGPT)  16 23   (A) Abnormal value            CBC w/diff    CBC w/Diff 8/31/21 3/9/22   WBC 9.23 7.11   RBC 4.35 4.25   Hemoglobin 13.6 13.2   Hematocrit 40.7 39.3   MCV 93.6 92.5   MCH 31.3 31.1   MCHC 33.4 33.6   RDW 14.8 14.7   Platelets 191 184   Neutrophil Rel % 60.1 58.9   Lymphocyte Rel % 25.9 27.3   Monocyte Rel % 8.2 8.3   Eosinophil Rel % 5.6 5.1   Basophil Rel % 0.2 0.4           Lipid Panel    Lipid Panel 8/31/21 3/9/22   Total Cholesterol  102 (A)   Triglycerides  105   HDL Cholesterol  28 (A)   VLDL Cholesterol  20   LDL Cholesterol  66 54   LDL/HDL Ratio  1.89   (A) Abnormal value            TSH    TSH 8/31/21   TSH 1.760           A1C Last 3 Results    HGBA1C Last 3 Results 8/31/21 3/9/22   Hemoglobin A1C 6.13 (A) 6.10 (A)   (A) Abnormal value            PSA    PSA 8/31/21 3/9/22   PSA 0.170 0.114            Data reviewed: Consultant notes Dr. Mesa, cardiology/Dr. Weathers/nephrologist and GI studies colonoscopy 02/08/2021 by Dr. Erickson          Assessment and Plan    Diagnoses and all orders for this visit:    1. Type 2 diabetes mellitus with both eyes affected by retinopathy without macular edema, without long-term current use of insulin, unspecified retinopathy severity (HCC) (Primary)  -     CBC Auto Differential; Future  -     Comprehensive Metabolic Panel; Future  -     Hemoglobin A1c; Future  -     LDL Cholesterol, Direct; Future  -     TSH; Future  -     Vitamin B12; Future  -     Vitamin D 25 Hydroxy; Future    2. Diabetic peripheral neuropathy (HCC)  -     gabapentin (NEURONTIN) 600 MG tablet; Take 1 tablet by mouth Every Night.  Dispense: 30 tablet; Refill: 5  -     Hemoglobin A1c; Future    3. Hyperlipidemia associated with type 2 diabetes mellitus (HCC)  -     LDL Cholesterol, Direct; Future    4. Hypertriglyceridemia  -     LDL Cholesterol, Direct; Future    5. Essential hypertension  -     Comprehensive Metabolic Panel; Future    6. Coronary arteriosclerosis - followed by Dr. Farooq  -     LDL Cholesterol, Direct; Future    7. Vitamin D deficiency  -     Vitamin D 25 Hydroxy; Future    8. 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 Stage 3a chronic kidney disease ([facility])  -     Comprehensive Metabolic Panel; Future    10. Benign prostatic hyperplasia with nocturia    11. Malignant tumor of prostate (CMS/[facility]) - h/o 2001  -     PSA DIAGNOSTIC; Future    12. Vitamin B12 deficiency    13. Type 2 diabetes mellitus with stage 3a chronic kidney disease, without long-term current use of insulin (HCC)  -     Hemoglobin A1c; Future    14. Centrilobular emphysema (HCC)  -     CBC Auto Differential; Future    15. Gastroesophageal reflux disease, unspecified whether esophagitis  present  -     CBC Auto Differential; Future    16. Gout due to renal impairment, unspecified chronicity, unspecified site  -     Uric Acid; Future    17. Class 3 severe obesity due to excess calories with serious comorbidity and body mass index (BMI) of 40.0 to 44.9 in adult (HCC)  -     Comprehensive Metabolic Panel; Future    18. Generalized anxiety disorder    Other orders  -     escitalopram (Lexapro) 5 MG tablet; Take 1 tablet by mouth Every Morning.  Dispense: 90 tablet; Refill: 3  -     vitamin B-12 (CYANOCOBALAMIN) 500 MCG tablet; Take 1 tablet by mouth Every Other Day.  Dispense: 15 tablet; Refill: 11         I spent 35 minutes caring for Eleno on this date of service. This time includes time spent by me in the following activities:preparing for the visit, reviewing tests, obtaining and/or reviewing a separately obtained history, performing a medically appropriate examination and/or evaluation , counseling and educating the patient/family/caregiver, ordering medications, tests, or procedures and documenting information in the medical record     We have provided patient a letter to request mail delivery to their home to prevent them from making the trip to the post office.     I recommended patient start oral B-12 500 mg q.o.d.  This is sent to the pharmacy to be added to patient's prepackaged medications next month.         I sent a prescription for Lexapro 5 mg q.a.m. and asked patient to take the Trazodone approximately 2 hours prior to bedtime.  Reviewed good sleep hygiene habits.  Avoid screen time 1-2 hours prior to bedtime.  He can take a break from the melatonin since it does not appear to be working.  Melatonin tends to work better on p.r.n. basis instead of routinely.      Diabetes excellent control.   Pursue aggressive diet, exercise and weight loss efforts.  Refill sent for Neurontin for diabetic neuropathy.   Patient understands the risks associated with this controlled medication, including  tolerance and addiction.  He also agrees to only obtain this medication from me, and not from a another provider, unless that provider is covering for me in my absence.  He also agrees to be compliant in dosing, and not self adjust the dose of medication.  A signed controlled substance agreement is on file, and he has received a controlled substance education sheet at this a previous visit.  He has also signed a consent for treatment with a controlled substance as per [facility] policy. NEFTALY was obtained.    Continue current BP medications.  Pursue sodium restriction and weight loss.  Monitor BP and notify me if not consistently at goal.    Continue Crestor. cholesterol at goal.  Keep cardiology follow up visits as scheduled with Dr. Mesa.  He has scheduled appointment next week.  I asked patient to make sure Dr. Mesa is aware of the occasional left sided chest pain.         Continue nebulizer treatments and inhalers to manage his COPD, stable.     There is no evidence of recurrence of prostate cancer.  We will continue to follow diagnostic PSA, which is undetectable.    Return in six months for routine follow up with fasting labs one week prior or sooner if needed.     Sc[facility] for Dr. Israel [facility].     Follow Up   Return in about 6 months (around 9/15/2022) for Follow up in six months with labs one week prior..  Patient was given instructions and counseling regarding his condition or for health maintenance advice. Please see specific information pulled into the AVS if appropriate.

## 2022-03-21 DIAGNOSIS — I25.10 CORONARY ARTERIOSCLEROSIS: ICD-10-CM

## 2022-03-21 DIAGNOSIS — E78.1 HYPERTRIGLYCERIDEMIA: Primary | ICD-10-CM

## 2022-03-21 RX ORDER — ROSUVASTATIN CALCIUM 20 MG/1
20 TABLET, COATED ORAL EVERY OTHER DAY
Qty: 15 TABLET | Refills: 2 | Status: SHIPPED | OUTPATIENT
Start: 2022-03-21 | End: 2022-07-08

## 2022-03-21 RX ORDER — CLOPIDOGREL BISULFATE 75 MG/1
75 TABLET ORAL DAILY
Qty: 30 TABLET | Refills: 2 | Status: SHIPPED | OUTPATIENT
Start: 2022-03-21 | End: 2022-07-08

## 2022-05-02 RX ORDER — ISOSORBIDE MONONITRATE 30 MG/1
TABLET, EXTENDED RELEASE ORAL
Qty: 30 TABLET | Refills: 5 | Status: SHIPPED | OUTPATIENT
Start: 2022-05-02 | End: 2022-10-26

## 2022-05-11 RX ORDER — FLUTICASONE PROPIONATE AND SALMETEROL 250; 50 UG/1; UG/1
POWDER RESPIRATORY (INHALATION)
Qty: 180 EACH | Refills: 5 | Status: SHIPPED | OUTPATIENT
Start: 2022-05-11 | End: 2023-02-02 | Stop reason: ALTCHOICE

## 2022-05-26 RX ORDER — TRAZODONE HYDROCHLORIDE 150 MG/1
TABLET ORAL
Qty: 30 TABLET | Refills: 2 | Status: SHIPPED | OUTPATIENT
Start: 2022-05-26 | End: 2022-08-30

## 2022-06-29 DIAGNOSIS — E78.1 HYPERTRIGLYCERIDEMIA: ICD-10-CM

## 2022-06-29 DIAGNOSIS — I25.10 CORONARY ARTERIOSCLEROSIS: ICD-10-CM

## 2022-06-29 RX ORDER — AMLODIPINE BESYLATE AND BENAZEPRIL HYDROCHLORIDE 5; 20 MG/1; MG/1
CAPSULE ORAL
Qty: 60 CAPSULE | Refills: 5 | Status: SHIPPED | OUTPATIENT
Start: 2022-06-29 | End: 2022-12-27

## 2022-06-29 RX ORDER — CARVEDILOL 3.12 MG/1
TABLET ORAL
Qty: 30 TABLET | Refills: 5 | Status: SHIPPED | OUTPATIENT
Start: 2022-06-29 | End: 2022-12-27

## 2022-06-29 RX ORDER — TAMSULOSIN HYDROCHLORIDE 0.4 MG/1
1 CAPSULE ORAL NIGHTLY
Qty: 30 CAPSULE | Refills: 5 | Status: SHIPPED | OUTPATIENT
Start: 2022-06-29 | End: 2022-12-27

## 2022-07-08 RX ORDER — ROSUVASTATIN CALCIUM 20 MG/1
20 TABLET, COATED ORAL EVERY OTHER DAY
Qty: 15 TABLET | Refills: 2 | Status: SHIPPED | OUTPATIENT
Start: 2022-07-08 | End: 2022-09-28

## 2022-07-08 RX ORDER — CLOPIDOGREL BISULFATE 75 MG/1
75 TABLET ORAL DAILY
Qty: 30 TABLET | Refills: 2 | Status: SHIPPED | OUTPATIENT
Start: 2022-07-08 | End: 2022-09-28

## 2022-07-26 ENCOUNTER — TRANSCRIBE ORDERS (OUTPATIENT)
Dept: LAB | Facility: OTHER | Age: 82
End: 2022-07-26

## 2022-07-26 ENCOUNTER — LAB (OUTPATIENT)
Dept: LAB | Facility: OTHER | Age: 82
End: 2022-07-26

## 2022-07-26 DIAGNOSIS — N18.31 STAGE 3A CHRONIC KIDNEY DISEASE: Primary | ICD-10-CM

## 2022-07-26 DIAGNOSIS — N18.31 STAGE 3A CHRONIC KIDNEY DISEASE: ICD-10-CM

## 2022-07-26 LAB
ALBUMIN SERPL-MCNC: 3.9 G/DL (ref 3.5–5)
ANION GAP SERPL CALCULATED.3IONS-SCNC: 10 MMOL/L (ref 5–15)
BUN SERPL-MCNC: 21 MG/DL (ref 7–23)
BUN/CREAT SERPL: 14.3 (ref 7–25)
CALCIUM SPEC-SCNC: 9.2 MG/DL (ref 8.4–10.2)
CHLORIDE SERPL-SCNC: 106 MMOL/L (ref 101–112)
CO2 SERPL-SCNC: 28 MMOL/L (ref 22–30)
CREAT SERPL-MCNC: 1.47 MG/DL (ref 0.7–1.3)
CREAT UR-MCNC: 37.8 MG/DL
EGFRCR SERPLBLD CKD-EPI 2021: 46.7 ML/MIN/1.73
GLUCOSE SERPL-MCNC: 129 MG/DL (ref 70–99)
PHOSPHATE SERPL-MCNC: 3.1 MG/DL (ref 2.5–4.5)
POTASSIUM SERPL-SCNC: 4.1 MMOL/L (ref 3.4–5)
PROT ?TM UR-MCNC: 4.5 MG/DL
PROT/CREAT UR: 119 MG/G CREA (ref 0–200)
PTH-INTACT SERPL-MCNC: 33.3 PG/ML (ref 15–65)
SODIUM SERPL-SCNC: 144 MMOL/L (ref 137–145)

## 2022-07-26 PROCEDURE — 83970 ASSAY OF PARATHORMONE: CPT | Performed by: INTERNAL MEDICINE

## 2022-07-26 PROCEDURE — 84156 ASSAY OF PROTEIN URINE: CPT | Performed by: INTERNAL MEDICINE

## 2022-07-26 PROCEDURE — 36415 COLL VENOUS BLD VENIPUNCTURE: CPT | Performed by: INTERNAL MEDICINE

## 2022-07-26 PROCEDURE — 82570 ASSAY OF URINE CREATININE: CPT | Performed by: INTERNAL MEDICINE

## 2022-07-26 PROCEDURE — 80069 RENAL FUNCTION PANEL: CPT | Performed by: INTERNAL MEDICINE

## 2022-07-29 RX ORDER — TOLTERODINE 4 MG/1
4 CAPSULE, EXTENDED RELEASE ORAL NIGHTLY
Qty: 30 CAPSULE | Refills: 3 | Status: SHIPPED | OUTPATIENT
Start: 2022-07-29 | End: 2022-11-28

## 2022-07-29 RX ORDER — BUSPIRONE HYDROCHLORIDE 10 MG/1
TABLET ORAL
Qty: 60 TABLET | Refills: 3 | Status: SHIPPED | OUTPATIENT
Start: 2022-07-29 | End: 2022-11-28

## 2022-07-29 RX ORDER — TRIAMTERENE AND HYDROCHLOROTHIAZIDE 37.5; 25 MG/1; MG/1
TABLET ORAL
Qty: 30 TABLET | Refills: 3 | Status: SHIPPED | OUTPATIENT
Start: 2022-07-29 | End: 2022-11-28

## 2022-08-26 RX ORDER — ALLOPURINOL 100 MG/1
TABLET ORAL
Qty: 30 TABLET | Refills: 5 | Status: SHIPPED | OUTPATIENT
Start: 2022-08-26 | End: 2023-01-25

## 2022-08-29 RX ORDER — OMEPRAZOLE 40 MG/1
CAPSULE, DELAYED RELEASE ORAL
Qty: 60 CAPSULE | Refills: 5 | Status: SHIPPED | OUTPATIENT
Start: 2022-08-29 | End: 2023-02-23

## 2022-08-29 RX ORDER — CETIRIZINE HYDROCHLORIDE 10 MG/1
10 TABLET ORAL DAILY PRN
Qty: 30 TABLET | Refills: 5 | Status: SHIPPED | OUTPATIENT
Start: 2022-08-29 | End: 2023-02-23

## 2022-08-30 RX ORDER — CHOLECALCIFEROL (VITAMIN D3) 125 MCG
CAPSULE ORAL
Qty: 30 TABLET | OUTPATIENT
Start: 2022-08-30

## 2022-08-30 RX ORDER — CHOLECALCIFEROL (VITAMIN D3) 50 MCG
TABLET ORAL
Qty: 12 TABLET | Refills: 3 | Status: SHIPPED | OUTPATIENT
Start: 2022-08-30 | End: 2022-11-28

## 2022-08-30 RX ORDER — TRAZODONE HYDROCHLORIDE 150 MG/1
TABLET ORAL
Qty: 90 TABLET | Refills: 3 | Status: SHIPPED | OUTPATIENT
Start: 2022-08-30

## 2022-09-16 ENCOUNTER — LAB (OUTPATIENT)
Dept: LAB | Facility: OTHER | Age: 82
End: 2022-09-16

## 2022-09-16 DIAGNOSIS — I10 ESSENTIAL HYPERTENSION: Chronic | ICD-10-CM

## 2022-09-16 DIAGNOSIS — E55.9 VITAMIN D DEFICIENCY: Chronic | ICD-10-CM

## 2022-09-16 DIAGNOSIS — C61 MALIGNANT TUMOR OF PROSTATE: Chronic | ICD-10-CM

## 2022-09-16 DIAGNOSIS — E78.1 HYPERTRIGLYCERIDEMIA: Chronic | ICD-10-CM

## 2022-09-16 DIAGNOSIS — J43.2 CENTRILOBULAR EMPHYSEMA: Chronic | ICD-10-CM

## 2022-09-16 DIAGNOSIS — E78.5 HYPERLIPIDEMIA ASSOCIATED WITH TYPE 2 DIABETES MELLITUS: Chronic | ICD-10-CM

## 2022-09-16 DIAGNOSIS — E11.319 DIABETIC RETINOPATHY OF BOTH EYES WITHOUT MACULAR EDEMA ASSOCIATED WITH TYPE 2 DIABETES MELLITUS, UNSPECIFIED RETINOPATHY SEVERITY: Chronic | ICD-10-CM

## 2022-09-16 DIAGNOSIS — K21.9 GASTROESOPHAGEAL REFLUX DISEASE, UNSPECIFIED WHETHER ESOPHAGITIS PRESENT: Chronic | ICD-10-CM

## 2022-09-16 DIAGNOSIS — E11.319 TYPE 2 DIABETES MELLITUS WITH BOTH EYES AFFECTED BY RETINOPATHY WITHOUT MACULAR EDEMA, WITHOUT LONG-TERM CURRENT USE OF INSULIN, UNSPECIFIED RETINOPATHY SEVERITY: Chronic | ICD-10-CM

## 2022-09-16 DIAGNOSIS — E66.01 CLASS 3 SEVERE OBESITY DUE TO EXCESS CALORIES WITH SERIOUS COMORBIDITY AND BODY MASS INDEX (BMI) OF 40.0 TO 44.9 IN ADULT: Chronic | ICD-10-CM

## 2022-09-16 DIAGNOSIS — N18.31 TYPE 2 DIABETES MELLITUS WITH STAGE 3A CHRONIC KIDNEY DISEASE, WITHOUT LONG-TERM CURRENT USE OF INSULIN: Chronic | ICD-10-CM

## 2022-09-16 DIAGNOSIS — M10.30 GOUT DUE TO RENAL IMPAIRMENT, UNSPECIFIED CHRONICITY, UNSPECIFIED SITE: Chronic | ICD-10-CM

## 2022-09-16 DIAGNOSIS — I25.10 CORONARY ARTERIOSCLEROSIS: Chronic | ICD-10-CM

## 2022-09-16 DIAGNOSIS — E11.69 HYPERLIPIDEMIA ASSOCIATED WITH TYPE 2 DIABETES MELLITUS: Chronic | ICD-10-CM

## 2022-09-16 DIAGNOSIS — N18.31 STAGE 3A CHRONIC KIDNEY DISEASE: Chronic | ICD-10-CM

## 2022-09-16 DIAGNOSIS — E11.42 DIABETIC PERIPHERAL NEUROPATHY: ICD-10-CM

## 2022-09-16 DIAGNOSIS — E11.22 TYPE 2 DIABETES MELLITUS WITH STAGE 3A CHRONIC KIDNEY DISEASE, WITHOUT LONG-TERM CURRENT USE OF INSULIN: Chronic | ICD-10-CM

## 2022-09-16 LAB
25(OH)D3 SERPL-MCNC: 53.2 NG/ML (ref 30–100)
ALBUMIN SERPL-MCNC: 3.7 G/DL (ref 3.5–5)
ALBUMIN/GLOB SERPL: 1.3 G/DL (ref 1.1–1.8)
ALP SERPL-CCNC: 92 U/L (ref 38–126)
ALT SERPL W P-5'-P-CCNC: 17 U/L
ANION GAP SERPL CALCULATED.3IONS-SCNC: 6 MMOL/L (ref 5–15)
ARTICHOKE IGE QN: 51 MG/DL (ref 0–100)
AST SERPL-CCNC: 21 U/L (ref 17–59)
BASOPHILS # BLD AUTO: 0.02 10*3/MM3 (ref 0–0.2)
BASOPHILS NFR BLD AUTO: 0.3 % (ref 0–1.5)
BILIRUB SERPL-MCNC: 0.4 MG/DL (ref 0.2–1.3)
BUN SERPL-MCNC: 15 MG/DL (ref 7–23)
BUN/CREAT SERPL: 12.5 (ref 7–25)
CALCIUM SPEC-SCNC: 8.8 MG/DL (ref 8.4–10.2)
CHLORIDE SERPL-SCNC: 109 MMOL/L (ref 101–112)
CO2 SERPL-SCNC: 26 MMOL/L (ref 22–30)
CREAT SERPL-MCNC: 1.2 MG/DL (ref 0.7–1.3)
DEPRECATED RDW RBC AUTO: 47.9 FL (ref 37–54)
EGFRCR SERPLBLD CKD-EPI 2021: 59.6 ML/MIN/1.73
EOSINOPHIL # BLD AUTO: 0.32 10*3/MM3 (ref 0–0.4)
EOSINOPHIL NFR BLD AUTO: 4.9 % (ref 0.3–6.2)
ERYTHROCYTE [DISTWIDTH] IN BLOOD BY AUTOMATED COUNT: 14.5 % (ref 12.3–15.4)
GLOBULIN UR ELPH-MCNC: 2.8 GM/DL (ref 2.3–3.5)
GLUCOSE SERPL-MCNC: 119 MG/DL (ref 70–99)
HBA1C MFR BLD: 6.3 % (ref 4.8–5.6)
HCT VFR BLD AUTO: 39 % (ref 37.5–51)
HGB BLD-MCNC: 12.8 G/DL (ref 13–17.7)
LYMPHOCYTES # BLD AUTO: 1.95 10*3/MM3 (ref 0.7–3.1)
LYMPHOCYTES NFR BLD AUTO: 30 % (ref 19.6–45.3)
MCH RBC QN AUTO: 30.7 PG (ref 26.6–33)
MCHC RBC AUTO-ENTMCNC: 32.8 G/DL (ref 31.5–35.7)
MCV RBC AUTO: 93.5 FL (ref 79–97)
MONOCYTES # BLD AUTO: 0.56 10*3/MM3 (ref 0.1–0.9)
MONOCYTES NFR BLD AUTO: 8.6 % (ref 5–12)
NEUTROPHILS NFR BLD AUTO: 3.66 10*3/MM3 (ref 1.7–7)
NEUTROPHILS NFR BLD AUTO: 56.2 % (ref 42.7–76)
PLATELET # BLD AUTO: 155 10*3/MM3 (ref 140–450)
PMV BLD AUTO: 10.9 FL (ref 6–12)
POTASSIUM SERPL-SCNC: 3.9 MMOL/L (ref 3.4–5)
PROT SERPL-MCNC: 6.5 G/DL (ref 6.3–8.6)
PSA SERPL-MCNC: 0.09 NG/ML (ref 0–4)
RBC # BLD AUTO: 4.17 10*6/MM3 (ref 4.14–5.8)
SODIUM SERPL-SCNC: 141 MMOL/L (ref 137–145)
TSH SERPL DL<=0.05 MIU/L-ACNC: 1.42 UIU/ML (ref 0.27–4.2)
URATE SERPL-MCNC: 5.8 MG/DL (ref 3.5–8.5)
VIT B12 BLD-MCNC: 465 PG/ML (ref 211–946)
WBC NRBC COR # BLD: 6.51 10*3/MM3 (ref 3.4–10.8)

## 2022-09-16 PROCEDURE — 80053 COMPREHEN METABOLIC PANEL: CPT | Performed by: INTERNAL MEDICINE

## 2022-09-16 PROCEDURE — 85025 COMPLETE CBC W/AUTO DIFF WBC: CPT | Performed by: INTERNAL MEDICINE

## 2022-09-16 PROCEDURE — 82306 VITAMIN D 25 HYDROXY: CPT | Performed by: INTERNAL MEDICINE

## 2022-09-16 PROCEDURE — 36415 COLL VENOUS BLD VENIPUNCTURE: CPT | Performed by: INTERNAL MEDICINE

## 2022-09-16 PROCEDURE — 84153 ASSAY OF PSA TOTAL: CPT | Performed by: INTERNAL MEDICINE

## 2022-09-16 PROCEDURE — 83036 HEMOGLOBIN GLYCOSYLATED A1C: CPT | Performed by: INTERNAL MEDICINE

## 2022-09-16 PROCEDURE — 82607 VITAMIN B-12: CPT | Performed by: INTERNAL MEDICINE

## 2022-09-16 PROCEDURE — 84443 ASSAY THYROID STIM HORMONE: CPT | Performed by: INTERNAL MEDICINE

## 2022-09-16 PROCEDURE — 83721 ASSAY OF BLOOD LIPOPROTEIN: CPT | Performed by: INTERNAL MEDICINE

## 2022-09-23 ENCOUNTER — OFFICE VISIT (OUTPATIENT)
Dept: FAMILY MEDICINE CLINIC | Facility: CLINIC | Age: 82
End: 2022-09-23

## 2022-09-23 VITALS
SYSTOLIC BLOOD PRESSURE: 110 MMHG | TEMPERATURE: 97.7 F | WEIGHT: 272 LBS | OXYGEN SATURATION: 97 % | BODY MASS INDEX: 41.22 KG/M2 | HEART RATE: 65 BPM | HEIGHT: 68 IN | DIASTOLIC BLOOD PRESSURE: 60 MMHG

## 2022-09-23 DIAGNOSIS — E11.22 TYPE 2 DIABETES MELLITUS WITH STAGE 3A CHRONIC KIDNEY DISEASE, WITHOUT LONG-TERM CURRENT USE OF INSULIN: Chronic | ICD-10-CM

## 2022-09-23 DIAGNOSIS — E11.69 HYPERLIPIDEMIA ASSOCIATED WITH TYPE 2 DIABETES MELLITUS: Chronic | ICD-10-CM

## 2022-09-23 DIAGNOSIS — E78.5 HYPERLIPIDEMIA ASSOCIATED WITH TYPE 2 DIABETES MELLITUS: Chronic | ICD-10-CM

## 2022-09-23 DIAGNOSIS — E53.8 VITAMIN B12 DEFICIENCY: ICD-10-CM

## 2022-09-23 DIAGNOSIS — G89.29 CHRONIC RIGHT SHOULDER PAIN: ICD-10-CM

## 2022-09-23 DIAGNOSIS — C61 MALIGNANT TUMOR OF PROSTATE: Chronic | ICD-10-CM

## 2022-09-23 DIAGNOSIS — N18.31 STAGE 3A CHRONIC KIDNEY DISEASE: Chronic | ICD-10-CM

## 2022-09-23 DIAGNOSIS — E11.319 TYPE 2 DIABETES MELLITUS WITH BOTH EYES AFFECTED BY RETINOPATHY WITHOUT MACULAR EDEMA, WITHOUT LONG-TERM CURRENT USE OF INSULIN, UNSPECIFIED RETINOPATHY SEVERITY: Chronic | ICD-10-CM

## 2022-09-23 DIAGNOSIS — E11.42 DIABETIC PERIPHERAL NEUROPATHY: Chronic | ICD-10-CM

## 2022-09-23 DIAGNOSIS — N18.31 TYPE 2 DIABETES MELLITUS WITH STAGE 3A CHRONIC KIDNEY DISEASE, WITHOUT LONG-TERM CURRENT USE OF INSULIN: Chronic | ICD-10-CM

## 2022-09-23 DIAGNOSIS — M25.511 CHRONIC RIGHT SHOULDER PAIN: ICD-10-CM

## 2022-09-23 DIAGNOSIS — I10 ESSENTIAL HYPERTENSION: Chronic | ICD-10-CM

## 2022-09-23 DIAGNOSIS — E66.01 CLASS 3 SEVERE OBESITY DUE TO EXCESS CALORIES WITH SERIOUS COMORBIDITY AND BODY MASS INDEX (BMI) OF 40.0 TO 44.9 IN ADULT: Chronic | ICD-10-CM

## 2022-09-23 DIAGNOSIS — E78.1 HYPERTRIGLYCERIDEMIA: Chronic | ICD-10-CM

## 2022-09-23 DIAGNOSIS — E55.9 VITAMIN D DEFICIENCY: ICD-10-CM

## 2022-09-23 DIAGNOSIS — Z00.00 MEDICARE ANNUAL WELLNESS VISIT, SUBSEQUENT: Primary | ICD-10-CM

## 2022-09-23 PROBLEM — R42 DIZZINESS: Status: ACTIVE | Noted: 2020-02-25

## 2022-09-23 PROCEDURE — 1170F FXNL STATUS ASSESSED: CPT | Performed by: INTERNAL MEDICINE

## 2022-09-23 PROCEDURE — 1125F AMNT PAIN NOTED PAIN PRSNT: CPT | Performed by: INTERNAL MEDICINE

## 2022-09-23 PROCEDURE — 1159F MED LIST DOCD IN RCRD: CPT | Performed by: INTERNAL MEDICINE

## 2022-09-23 PROCEDURE — G0439 PPPS, SUBSEQ VISIT: HCPCS | Performed by: INTERNAL MEDICINE

## 2022-09-23 PROCEDURE — 96160 PT-FOCUSED HLTH RISK ASSMT: CPT | Performed by: INTERNAL MEDICINE

## 2022-09-23 PROCEDURE — 99214 OFFICE O/P EST MOD 30 MIN: CPT | Performed by: INTERNAL MEDICINE

## 2022-09-23 RX ORDER — GABAPENTIN 600 MG/1
600 TABLET ORAL NIGHTLY
Qty: 30 TABLET | Refills: 5 | Status: SHIPPED | OUTPATIENT
Start: 2022-09-23 | End: 2023-02-23

## 2022-09-23 RX ORDER — ACETAMINOPHEN 160 MG
TABLET,DISINTEGRATING ORAL
COMMUNITY
End: 2022-11-30 | Stop reason: SDUPTHER

## 2022-09-23 NOTE — PROGRESS NOTES
Chief Complaint  Follow-up (6 month), Medicare Wellness-subsequent, and Med Refill (Gabapentin)    Subjective        History of Present Illness     Eleno Savage presents to the office for 6-month follow up on type 2 diabetes mellitus, stage 2 chronic kidney disease, obesity, COPD, GERD, vitamin D deficiency, and high cholesterol/high triglycerides. Patient has a history of prostate cancer with no evidence of recurrence.  PSA remains undetectable. He follows with Dr. Weathers, nephrologist, for CKD.   Renal function considerably improved with creatinine 1.2, near normal range.  I told patient we can start following this without nephrology if renal function remains at this level, although it is still quite reasonable to continue to follow with nephrology annually, if he wishes.     Patient also completes subsequent Medicare wellness while in the office today.   We reviewed health maintenance issues as noted below.    Patient has worsening osteoarthritis.  We have to avoid chronic NSAID use due to renal function. He  is requesting a prescription for a compound he could apply to right shoulder and feet.  Refill prescription given to patient today.    Patient had colonoscopy 02/08/2021 by Dr. Erickson revealing one 1 small tubular adenomatous polyp as well as diverticulosis sigmoid, ascending and descending colon.  Repeat recommended in three years, making him due 02/2024.     He gets to sleep okay, but wakes during the night despite taking Trazodone 150 mg, 2 extra strength Tylenol and melatonin at times to help with sleep.  He takes Detrol LA for overactive bladder and Flomax for BPH.  We discussed adding finasteride.  He declines for now.    Weight is down 6 pounds in the past six months, but BMI is still far above goal at 41.4. Diabetes excellent control.  He continues on Neurontin each evening for diabetic neuropathy and denies significant side effects including excessive daytime sedation.  Patient understands the  "risks associated with this controlled medication, including tolerance and addiction    BP and HR at goal.          The patient's relevant past medical, surgical, and social history was reviewed in Epic.   Lab results are reviewed with the patient today. CBC unremarkable  A1c 6.3.  LDL at goal with Crestor.  PSA reveals no evidence of prostate cancer.  Normal limit function.      Objective   Vital Signs:  /60   Pulse 65   Temp 97.7 °F (36.5 °C)   Ht 172.7 cm (68\")   Wt 123 kg (272 lb)   SpO2 97%   BMI 41.36 kg/m²   Estimated body mass index is 41.36 kg/m² as calculated from the following:    Height as of this encounter: 172.7 cm (68\").    Weight as of this encounter: 123 kg (272 lb).          Physical Exam  Vitals reviewed.   Constitutional:       General: He is not in acute distress.     Appearance: He is well-developed.      Comments: Pleasant male.  Obese.  Accompanied by his wife.    HENT:      Head: Normocephalic and atraumatic.      Nose:      Right Sinus: No maxillary sinus tenderness or frontal sinus tenderness.      Left Sinus: No maxillary sinus tenderness or frontal sinus tenderness.      Mouth/Throat:      Mouth: No oral lesions.      Pharynx: Uvula midline.      Tonsils: No tonsillar exudate.   Eyes:      Conjunctiva/sclera: Conjunctivae normal.      Pupils: Pupils are equal, round, and reactive to light.   Neck:      Thyroid: No thyroid mass or thyromegaly.      Vascular: No carotid bruit or JVD.      Trachea: Trachea normal. No tracheal deviation.   Cardiovascular:      Rate and Rhythm: Normal rate and regular rhythm.  No extrasystoles are present.     Chest Wall: PMI is not displaced.      Heart sounds: Normal heart sounds. No murmur heard.  Pulmonary:      Effort: Pulmonary effort is normal. No accessory muscle usage or respiratory distress.      Breath sounds: Normal breath sounds. No decreased breath sounds, wheezing, rhonchi or rales.   Abdominal:      General: Bowel sounds are normal. " There is no distension.      Palpations: Abdomen is soft.      Tenderness: There is no abdominal tenderness.      Comments: Obese abdomen limits exam.    Musculoskeletal:      Cervical back: Neck supple.   Feet:      Comments: Trace edema bilateral ankles.   Lymphadenopathy:      Cervical: No cervical adenopathy.   Skin:     General: Skin is warm and dry.      Findings: No rash.      Nails: There is no clubbing.   Neurological:      Mental Status: He is alert and oriented to person, place, and time.      Cranial Nerves: No cranial nerve deficit.      Coordination: Coordination normal.   Psychiatric:         Speech: Speech normal.         Behavior: Behavior normal.         Thought Content: Thought content normal.         Judgment: Judgment normal.            Result Review :    CMP    CMP 3/9/22 7/26/22 9/16/22   Glucose 129 (A) 129 (A) 119 (A)   BUN 19 21 15   Creatinine 1.34 (A) 1.47 (A) 1.20   Sodium 142 144 141   Potassium 4.1 4.1 3.9   Chloride 106 106 109   Calcium 8.8 9.2 8.8   Albumin 3.90 3.90 3.70   Total Bilirubin 0.3  0.4   Alkaline Phosphatase 104  92   AST (SGOT) 25  21   ALT (SGPT) 23  17   (A) Abnormal value            CBC w/diff    CBC w/Diff 3/9/22 9/16/22   WBC 7.11 6.51   RBC 4.25 4.17   Hemoglobin 13.2 12.8 (A)   Hematocrit 39.3 39.0   MCV 92.5 93.5   MCH 31.1 30.7   MCHC 33.6 32.8   RDW 14.7 14.5   Platelets 184 155   Neutrophil Rel % 58.9 56.2   Lymphocyte Rel % 27.3 30.0   Monocyte Rel % 8.3 8.6   Eosinophil Rel % 5.1 4.9   Basophil Rel % 0.4 0.3   (A) Abnormal value            Lipid Panel    Lipid Panel 3/9/22 9/16/22   Total Cholesterol 102 (A)    Triglycerides 105    HDL Cholesterol 28 (A)    VLDL Cholesterol 20    LDL Cholesterol  54 51   LDL/HDL Ratio 1.89    (A) Abnormal value            TSH    TSH 9/16/22   TSH 1.420           A1C Last 3 Results    HGBA1C Last 3 Results 3/9/22 9/16/22   Hemoglobin A1C 6.10 (A) 6.30 (A)   (A) Abnormal value            PSA    PSA 3/9/22 9/16/22   PSA 0.114  0.090                     Assessment and Plan   Diagnoses and all orders for this visit:    1. Medicare annual wellness visit, subsequent (Primary)    2. 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 Essential hypertension  -     Comprehensive Metabolic Panel; Future    4. Hypertriglyceridemia  -     Lipid Panel; Future    5. Type 2 diabetes mellitus with stage 3a chronic kidney disease, without long-term current use of insulin ([facility])  -     CBC Auto Differential; Future  -     Comprehensive Metabolic Panel; Future  -     Hemoglobin A1c; Future  -     Lipid Panel; Future  -     Microalbumin / Creatinine Urine Ratio - Urine, Clean Catch; Future  -     Vitamin B12; Future    6. Hyperlipidemia associated with type 2 diabetes mellitus (HCC)  -     Lipid Panel; Future    7. Diabetic peripheral neuropathy ([facility])  -     gabapentin (NEURONTIN) 600 MG tablet; Take 1 tablet by mouth Every Night.  Dispense: 30 tablet; Refill: 5  -     Hemoglobin A1c; Future    8. Chronic right shoulder pain    9. Stage 3a chronic kidney disease ([facility])  -     Comprehensive Metabolic Panel; Future  -     Vitamin D 25 Hydroxy; Future    10. Malignant tumor of prostate (CMS/[facility]) - h/o 2001  -     PSA DIAGNOSTIC; Future    11. Vitamin D deficiency  -     Vitamin D 25 Hydroxy; Future    12. Vitamin B12 deficiency  -     Vitamin B12; Future    13. Class 3 severe obesity due to excess calories with serious comorbidity and body mass index (BMI) of 40.0 to 44.9 in adult (HCC)               Subsequent Medicare wellness exam completed today.  Recommended updated COVID vaccine ASAP.  Recommended  high dose flu vaccine in 1 month.  Otherwise, he is up to date on screenings and immunizations.      Continuel B-12 500 mg q.o.d., at goal.  Continue vitamin D supplement, at goal.     Continue  Lexapro 5 mg q.a.m. for JAMAL and depression.     His sleep disorder is not at goal, but he wants to avoid any potentially addictive sleep aids.  Continue Trazodone  prior to bedtime.  Reviewed good sleep hygiene habits.  Avoid screen time 1-2 hours prior to bedtime.  He can take breaks from the melatonin since it does not appear to be working.  He is also taking 2 extra strength Tylenol nightly to help relieve pain to help with sleep, and should continue that.     To address the new problem of chronic right shoulder pain and bilateral diabetic peripheral neuropathy pain of the feet, prescription sent for a prescription compound pain reliever to be compounded by his pharmacist including ketoprofen 10%, cyclobenzaprine 2%, lidocaine 5% and prilocaine 2.5 % topical which he may apply to bilateral hands and right shoulder to help with the worsening pain. We have to avoid chronic NSAID use due to renal function.  I am pleased to see improvement in renal function.  Continue follow up visits with Dr Weathers, nephrology, although, if renal function continues to stay at this level, we can follow.  Continue to pursue aggressive weight loss.  Continue to avoid NSAIDs and other nephrotoxic drugs.        Diabetes excellent control, but still needs to pursue aggressive weight loss.   Pursue aggressive diet, exercise and weight loss efforts.  He describes sedentary lifestyle. Refill sent for Neurontin for diabetic neuropathy.  Patient understands the risks associated with this controlled medication, including tolerance and addiction.  He also agrees to only obtain this medication from me, and not from a another provider, unless that provider is covering for me in my absence.  He also agrees to be compliant in dosing, and not self adjust the  dose of medication.  A signed controlled substance agreement is on file, and he has received a controlled substance education sheet at this a previous visit.  He has also signed a consent for treatment with a controlled substance as per [facility] policy. NEFTALY was obtained.     Continue current BP medications.  Blood pressure is at goal.  Pursue sodium restriction and weight loss.  Monitor BP and notify me if not consistently at goal.      Continue Crestor.  LDL cholesterol at goal.  Keep cardiology follow up visits as scheduled with Dr. Mesa.  Continue the current cardiovascular risk factor modifications      Continue nebulizer treatments and inhalers to manage his COPD, stable.    Continue Flomax for BPH and Detrol LA for overactive bladder symptoms.  He declines adding finasteride for now.      There is no evidence of recurrence of prostate cancer.  We will continue to follow diagnostic PSA, which is undetectable.     Return in 6 months for routine follow up with fasting labs one week prior or sooner if needed.     Scribed for Dr. Israel by Lacy Bustos [facility].     Follow Up   Return in about 6 months (around 3/23/2023) for Follow up in six months with labs one week prior..  Patient was given instructions and counseling regarding his condition or for health maintenance advice. Please see specific information pulled into the AVS if appropriate.

## 2022-09-23 NOTE — PATIENT INSTRUCTIONS
Medicare Wellness  Personal Prevention Plan of Service     Date of Office Visit:    Encounter Provider:  Fabian Israel MD  Place of Service:  [facility]  Patient Name: Eleno Savage  :  1940    As part of the Medicare Wellness portion of your visit today, we are providing you with this personalized preventive plan of services (PPPS). This plan is based upon recommendations of the [place] Preventive Services Task Force (USPSTF) and the Advisory Committee on Immunization Practices (ACIP).    This lists the preventive care services that should be considered, and provides dates of when you are due. Items listed as completed are up-to-date and do not require any further intervention.    Health Maintenance   Topic Date Due    COVID-19 Vaccine (5 - Booster for Pfizer series) 2022    ANNUAL WELLNESS VISIT  2022    INFLUENZA VACCINE  10/01/2022    HEMOGLOBIN A1C  2023    DIABETIC EYE EXAM  2023    URINE MICROALBUMIN  2023    LIPID PANEL  2023    COLORECTAL CANCER SCREENING  2024    TDAP/TD VACCINES (2 - Td or Tdap) 2029    Pneumococcal Vaccine 65+  Completed    ZOSTER VACCINE  Completed       No orders of the defined types were placed in this encounter.      No follow-ups on file.        Calorie Counting for Weight Loss  Calories are units of energy. Your body needs a certain number of calories from food to keep going throughout the day. When you eat or drink more calories than your body needs, your body stores the extra calories mostly as fat. When you eat or drink fewer calories than your body needs, your body burns fat to get the energy it needs.  Calorie counting means keeping track of how many calories you eat and drink each day. Calorie counting can be helpful if you need to lose weight. If you eat fewer calories than your body needs, you should lose weight. Ask your health care provider what a  healthy weight is for you.  For calorie counting to work, you will need to eat the right number of calories each day to lose a healthy amount of weight per week. A dietitian can help you figure out how many calories you need in a day and will suggest ways to reach your calorie goal.  A healthy amount of weight to lose each week is usually 1-2 lb (0.5-0.9 kg). This usually means that your daily calorie intake should be reduced by 500-750 calories.  Eating 1,200-1,500 calories a day can help most women lose weight.  Eating 1,500-1,800 calories a day can help most men lose weight.  What do I need to know about calorie counting?  Work with your health care provider or dietitian to determine how many calories you should get each day. To meet your daily calorie goal, you will need to:  Find out how many calories are in each food that you would like to eat. Try to do this before you eat.  Decide how much of the food you plan to eat.  Keep a food log. Do this by writing down what you ate and how many calories it had.  To successfully lose weight, it is important to balance calorie counting with a healthy lifestyle that includes regular activity.  Where do I find calorie information?  The number of calories in a food can be found on a Nutrition Facts label. If a food does not have a Nutrition Facts label, try to look up the calories online or ask your dietitian for help.  Remember that calories are listed per serving. If you choose to have more than one serving of a food, you will have to multiply the calories per serving by the number of servings you plan to eat. For example, the label on a package of bread might say that a serving size is 1 slice and that there are 90 calories in a serving. If you eat 1 slice, you will have eaten 90 calories. If you eat 2 slices, you will have eaten 180 calories.  How do I keep a food log?  After each time that you eat, record the following in your food log as soon as possible:  What you  ate. Be sure to include toppings, sauces, and other extras on the food.  How much you ate. This can be measured in cups, ounces, or number of items.  How many calories were in each food and drink.  The total number of calories in the food you ate.  Keep your food log near you, such as in a pocket-sized notebook or on an kelby or website on your mobile phone. Some programs will calculate calories for you and show you how many calories you have left to meet your daily goal.  What are some portion-control tips?  Know how many calories are in a serving. This will help you know how many servings you can have of a certain food.  Use a measuring cup to measure serving sizes. You could also try weighing out portions on a kitchen scale. With time, you will be able to estimate serving sizes for some foods.  Take time to put servings of different foods on your favorite plates or in your favorite bowls and cups so you know what a serving looks like.  Try not to eat straight from a food's packaging, such as from a bag or box. Eating straight from the package makes it hard to see how much you are eating and can lead to overeating. Put the amount you would like to eat in a cup or on a plate to make sure you are eating the right portion.  Use smaller plates, glasses, and bowls for smaller portions and to prevent overeating.  Try not to multitask. For example, avoid watching TV or using your computer while eating. If it is time to eat, sit down at a table and enjoy your food. This will help you recognize when you are full. It will also help you be more mindful of what and how much you are eating.  What are tips for following this plan?  Reading food labels  Check the calorie count compared with the serving size. The serving size may be smaller than what you are used to eating.  Check the source of the calories. Try to choose foods that are high in protein, fiber, and vitamins, and low in saturated fat, trans fat, and  sodium.  Shopping  Read nutrition labels while you shop. This will help you make healthy decisions about which foods to buy.  Pay attention to nutrition labels for low-fat or fat-free foods. These foods sometimes have the same number of calories or more calories than the full-fat versions. They also often have added sugar, starch, or salt to make up for flavor that was removed with the fat.  Make a grocery list of lower-calorie foods and stick to it.  Cooking  Try to cook your favorite foods in a healthier way. For example, try baking instead of frying.  Use low-fat dairy products.  Meal planning  Use more fruits and vegetables. One-half of your plate should be fruits and vegetables.  Include lean proteins, such as chicken, turkey, and fish.  Lifestyle  Each week, aim to do one of the followin minutes of moderate exercise, such as walking.  75 minutes of vigorous exercise, such as running.  General information  Know how many calories are in the foods you eat most often. This will help you calculate calorie counts faster.  Find a way of tracking calories that works for you. Get creative. Try different apps or programs if writing down calories does not work for you.  What foods should I eat?    Eat nutritious foods. It is better to have a nutritious, high-calorie food, such as an avocado, than a food with few nutrients, such as a bag of potato chips.  Use your calories on foods and drinks that will fill you up and will not leave you hungry soon after eating.  Examples of foods that fill you up are nuts and nut butters, vegetables, lean proteins, and high-fiber foods such as whole grains. High-fiber foods are foods with more than 5 g of fiber per serving.  Pay attention to calories in drinks. Low-calorie drinks include water and unsweetened drinks.  The items listed above may not be a complete list of foods and beverages you can eat. Contact a dietitian for more information.  What foods should I limit?  Limit  foods or drinks that are not good sources of vitamins, minerals, or protein or that are high in unhealthy fats. These include:  Candy.  Other sweets.  Sodas, specialty coffee drinks, alcohol, and juice.  The items listed above may not be a complete list of foods and beverages you should avoid. Contact a dietitian for more information.  How do I count calories when eating out?  Pay attention to portions. Often, portions are much larger when eating out. Try these tips to keep portions smaller:  Consider sharing a meal instead of getting your own.  If you get your own meal, eat only half of it. Before you start eating, ask for a container and put half of your meal into it.  When available, consider ordering smaller portions from the menu instead of full portions.  Pay attention to your food and drink choices. Knowing the way food is cooked and what is included with the meal can help you eat fewer calories.  If calories are listed on the menu, choose the lower-calorie options.  Choose dishes that include vegetables, fruits, whole grains, low-fat dairy products, and lean proteins.  Choose items that are boiled, broiled, grilled, or steamed. Avoid items that are buttered, battered, fried, or served with cream sauce. Items labeled as crispy are usually fried, unless stated otherwise.  Choose water, low-fat milk, unsweetened iced tea, or other drinks without added sugar. If you want an alcoholic beverage, choose a lower-calorie option, such as a glass of wine or light beer.  Ask for dressings, sauces, and syrups on the side. These are usually high in calories, so you should limit the amount you eat.  If you want a salad, choose a garden salad and ask for grilled meats. Avoid extra toppings such as baum, cheese, or fried items. Ask for the dressing on the side, or ask for olive oil and vinegar or lemon to use as dressing.  Estimate how many servings of a food you are given. Knowing serving sizes will help you be aware of  how much food you are eating at restaurants.  Where to find more information  Centers for Disease Control and Prevention: www.cdc.gov  [place] Department of Agriculture: myplate.gov  Summary  Calorie counting means keeping track of how many calories you eat and drink each day. If you eat fewer calories than your body needs, you should lose weight.  A healthy amount of weight to lose per week is usually 1-2 lb (0.5-0.9 kg). This usually means reducing your daily calorie intake by 500-750 calories.  The number of calories in a food can be found on a Nutrition Facts label. If a food does not have a Nutrition Facts label, try to look up the calories online or ask your dietitian for help.  Use smaller plates, glasses, and bowls for smaller portions and to prevent overeating.  Use your calories on foods and drinks that will fill you up and not leave you hungry shortly after a meal.  This information is not intended to replace advice given to you by your health care provider. Make sure you discuss any questions you have with your health care provider.  Document Revised: 01/28/2021 Document Reviewed: 01/28/2021  [employer] Patient Education © 2022 [employer]

## 2022-09-23 NOTE — PROGRESS NOTES
The ABCs of the Annual Wellness Visit  Subsequent Medicare Wellness Visit    Chief Complaint   Patient presents with   • Follow-up     6 month   • Medicare Wellness-subsequent   • Med Refill     Gabapentin      Subjective    History of Present Illness:  Eleno Savage is a 82 y.o. male who presents for a Subsequent Medicare Wellness Visit.    The following portions of the patient's history were reviewed and   updated as appropriate: allergies, current medications, past family history, past medical history, past social history, past surgical history and problem list.    Compared to one year ago, the patient feels his physical   health is the same.    Compared to one year ago, the patient feels his mental   health is the same.    Recent Hospitalizations:  He was not admitted to the hospital during the last year.       Current Medical Providers:  Patient Care Team:  Fabian Israel MD as PCP - General    Outpatient Medications Prior to Visit   Medication Sig Dispense Refill   • Acetaminophen (TYLENOL EXTRA STRENGTH PO) Take  by mouth as needed.     • albuterol (PROVENTIL) (2.5 MG/3ML) 0.083% nebulizer solution Take 2.5 mg by nebulization every 4 (four) hours as needed.     • allopurinol (ZYLOPRIM) 100 MG tablet TAKE 1 TABLET BY MOUTH DAILY. FOR HIGH URIC ACID 30 tablet 5   • amLODIPine-benazepril (LOTREL 5-20) 5-20 MG per capsule TAKE 1 CAPSULES BY MOUTH TWO TIMES A DAY *EQUIPP/HUMANA* 60 capsule 5   • aspirin 325 MG tablet Take 325 mg by mouth daily.     • busPIRone (BUSPAR) 10 MG tablet TAKE ONE TAB TWICE DAILY 60 tablet 3   • Calcium Carbonate Antacid (TUMS ULTRA PO) Take  by mouth.     • carvedilol (COREG) 3.125 MG tablet 1 TAB(S) BY MOUTH DAILY **HUMANA** 30 tablet 5   • cetirizine (zyrTEC) 10 MG tablet TAKE 1 TABLET BY MOUTH DAILY AS NEEDED FOR ALLERGIES. FOR ALLERGIES 30 tablet 5   • Cholecalciferol (Vitamin D) 50 MCG (2000 UT) tablet One capsule Monday, Weds and Friday 12 tablet 3   • CloNIDine (CATAPRES) 0.1  MG tablet Take 1 tablet as needed for systolic blood pressure greater than 140. May repeat in 4 hours if needed. Maximum of 2 doses daily. 60 tablet 6   • Coenzyme Q10 100 MG tablet 1-2 tablets daily     • escitalopram (Lexapro) 5 MG tablet Take 1 tablet by mouth Every Morning. 90 tablet 3   • fluticasone (FLONASE) 50 MCG/ACT nasal spray 1 SPRAY INTO EACH NOSTRIL 2 (TWO) TIMES A DAY FOR 30 DAYS. ADMINISTER 2 SPRAYS IN EACH NOSTRIL FOR EACH DOSE. 16 g 5   • Fluticasone-Salmeterol (ADVAIR) 250-50 MCG/ACT DISKUS INHALE 1 PUFF 2 (TWO) TIMES A DAY. 180 each 5   • isosorbide mononitrate (IMDUR) 30 MG 24 hr tablet TAKE 1/2 TABLET BY MOUTH TWO TIMES A DAY 30 tablet 5   • nitroglycerin (NITROSTAT) 0.4 MG SL tablet 1 TABLET(S) SUBLINGUAL AS NEEDED FOR CHEST PAIN (MAY REPEAT EVERY 5 MINUTES BUT SEEK MEDICAL HELP IF PAIN PERSISTS AFTER 3 TABLETS) 25 tablet 11   • omeprazole (priLOSEC) 40 MG capsule TAKE ONE CAPSULE TWICE DAILY 60 capsule 5   • tamsulosin (FLOMAX) 0.4 MG capsule 24 hr capsule TAKE 1 CAPSULE BY MOUTH EVERY NIGHT. 30 capsule 5   • tiotropium (SPIRIVA) 18 MCG per inhalation capsule Place 1 capsule into inhaler and inhale 1 (one) time daily.     • tolterodine LA (DETROL LA) 4 MG 24 hr capsule TAKE 1 CAPSULE BY MOUTH EVERY NIGHT. 30 capsule 3   • traZODone (DESYREL) 150 MG tablet TAKE 1/2 TO 1 TABLET EVERY NIGHT 90 tablet 3   • triamterene-hydrochlorothiazide (MAXZIDE-25) 37.5-25 MG per tablet TAKE 1 TABLET BY MOUTH DAILY. **HUMANA** 30 tablet 3   • Ventolin  (90 Base) MCG/ACT inhaler USE 2 PUFFS FOUR TIMES A DAY AS NEEDED 18 g 6   • vitamin B-12 (CYANOCOBALAMIN) 500 MCG tablet Take 1 tablet by mouth Every Other Day. 15 tablet 11   • clopidogrel (PLAVIX) 75 MG tablet TAKE 1 TABLET BY MOUTH DAILY. 30 tablet 2   • gabapentin (NEURONTIN) 600 MG tablet Take 1 tablet by mouth Every Night. 30 tablet 5   • rosuvastatin (CRESTOR) 20 MG tablet TAKE 1 TABLET BY MOUTH EVERY OTHER DAY. 15 tablet 2   • Cholecalciferol  (Vitamin D3) 50 MCG (2000 UT) capsule vitamin d-3 2000iu   ONE CAPSULE MONDAY, WEDS, AND FRIDAY     • Multiple Vitamins-Minerals (PRESERVISION AREDS 2 PO) PreserVision AREDS       No facility-administered medications prior to visit.       No opioid medication identified on active medication list. I have reviewed chart for other potential  high risk medication/s and harmful drug interactions in the elderly.          Aspirin is on active medication list. Aspirin use is indicated based on review of current medical condition/s. Pros and cons of this therapy have been discussed today. Benefits of this medication outweigh potential harm.  Patient has been encouraged to continue taking this medication.  .      Patient Active Problem List   Diagnosis   • Pseudophakia   • Vitamin D deficiency   • Type 2 diabetes mellitus with stage 3 chronic kidney disease, without long-term current use of insulin ([facility])   • Skin sensation disturbance   • Rupture of Achilles tendon   • Radiculopathy, lumbar region   • Primary localized osteoarthrosis, ankle and foot   • Obstructive sleep apnea syndrome - on CPAP   • Class 3 severe obesity due to excess calories with serious comorbidity and body mass index (BMI) of 40.0 to 44.9 in adult ([facility])   • Non-organic sleep disorder   • Malignant tumor of prostate (CMS/[facility]) - h/o 2001   • Lumbar radiculopathy   • Insomnia   • Hyperuricemia   • Hypertriglyceridemia   • Hemorrhoids   • Hearing loss   • Gout   • GERD (gastroesophageal reflux disease)   • Generalized anxiety disorder   • Fracture of coccyx ([facility])   • Exostosis   • Essential hypertension   • Encounter for screening for malignant neoplasm of prostate   • Diverticular disease of colon   • Diabetic retinopathy (CMS/HCC) - Dr. Jansen   • Degenerative joint disease involving multiple joints   • Degeneration of intervertebral disc of lumbosacral region   • Coronary arteriosclerosis - followed by Dr. Farooq   • Contracture of ankle and foot joint  "  • Chronic obstructive lung disease (HCC)   • Chronic kidney disease, stage 3 (HCC)   • Bilateral pseudophakia   • Benign prostatic hyperplasia   • Backache   • Allergic rhinitis   • Sleep disorder -melatonin worked for a while.  Reattempting trazodone, 3/2022   • Type 2 diabetes mellitus with both eyes affected by retinopathy without macular edema, without long-term current use of insulin (HCC)   • Abnormal cardiovascular function study   • Abnormal glucose level   • Acute-on-chronic renal failure (HCC)   • Edema of lower extremity   • Post PTCA   • Rectal bleeding   • Hx of adenomatous colonic polyps   • Diverticulosis   • Hyperlipidemia associated with type 2 diabetes mellitus (HCC)   • Chronic idiopathic constipation   • Coronary angioplasty status   • Abnormal cardiovascular stress test   • Dyspnea on exertion   • Palpitations   • Vitamin B12 deficiency   • Dizziness     Advance Care Planning  Advance Directive is not on file.  ACP discussion was held with the patient during this visit. Patient has an advance directive (not in EMR), copy requested.          Objective    Vitals:    09/23/22 1036   BP: 110/60   Pulse: 65   Temp: 97.7 °F (36.5 °C)   SpO2: 97%   Weight: 123 kg (272 lb)   Height: 172.7 cm (68\")   PainSc:   2   PainLoc: Generalized     Estimated body mass index is 41.36 kg/m² as calculated from the following:    Height as of this encounter: 172.7 cm (68\").    Weight as of this encounter: 123 kg (272 lb).    Class 3 Severe Obesity (BMI >=40). Obesity-related health conditions include the following: obstructive sleep apnea, hypertension, dyslipidemias, GERD and osteoarthritis. Obesity is unchanged. BMI is is above average; BMI management plan is completed. We discussed portion control and increasing exercise.      Does the patient have evidence of cognitive impairment? No    Physical Exam  Lab Results   Component Value Date    LDL 51 09/16/2022    HGBA1C 6.30 (H) 09/16/2022            HEALTH RISK " ASSESSMENT    Smoking Status:  Social History     Tobacco Use   Smoking Status Former Smoker   • Packs/day: 2.00   • Years: 41.00   • Pack years: 82.00   • Types: Cigarettes   • Quit date: 1985   • Years since quittin.7   Smokeless Tobacco Never Used     Alcohol Consumption:  Social History     Substance and Sexual Activity   Alcohol Use No     Fall Risk Screen:    EV Fall Risk Assessment was completed, and patient is at LOW risk for falls.Assessment completed on:2022    Depression Screening:  PHQ-2/PHQ-9 Depression Screening 2022   Retired PHQ-9 Total Score -   Retired Total Score -   Little Interest or Pleasure in Doing Things 0-->not at all   Feeling Down, Depressed or Hopeless 0-->not at all   PHQ-9: Brief Depression Severity Measure Score 0       Health Habits and Functional and Cognitive Screening:  Functional & Cognitive Status 2022   Do you have difficulty preparing food and eating? No   Do you have difficulty bathing yourself, getting dressed or grooming yourself? No   Do you have difficulty using the toilet? No   Do you have difficulty moving around from place to place? Yes   Do you have trouble with steps or getting out of a bed or a chair? Yes   Current Diet Frequent Junk Food   Dental Exam Up to date   Eye Exam Up to date   Exercise (times per week) 5 times per week   Current Exercises Include Yard Work   Current Exercise Activities Include -   Do you need help using the phone?  No   Are you deaf or do you have serious difficulty hearing?  Yes   Do you need help with transportation? No   Do you need help shopping? No   Do you need help preparing meals?  No   Do you need help with housework?  No   Do you need help with laundry? No   Do you need help taking your medications? Yes   Do you need help managing money? No   Do you ever drive or ride in a car without wearing a seat belt? No   Have you felt unusual stress, anger or loneliness in the last month? No   Who do you live  with? Spouse   If you need help, do you have trouble finding someone available to you? No   Have you been bothered in the last four weeks by sexual problems? No   Do you have difficulty concentrating, remembering or making decisions? No       Age-appropriate Screening Schedule:  Refer to the list below for future screening recommendations based on patient's age, sex and/or medical conditions. Orders for these recommended tests are listed in the plan section. The patient has been provided with a written plan.    Health Maintenance   Topic Date Due   • INFLUENZA VACCINE  08/01/2022   • HEMOGLOBIN A1C  03/16/2023   • DIABETIC EYE EXAM  06/06/2023   • URINE MICROALBUMIN  07/26/2023   • LIPID PANEL  09/16/2023   • TDAP/TD VACCINES (2 - Td or Tdap) 07/30/2029   • ZOSTER VACCINE  Completed              Assessment & Plan   CMS Preventative Services Quick Reference  Risk Factors Identified During Encounter  Cardiovascular Disease  Chronic Pain   Immunizations Discussed/Encouraged (specific Immunizations; Influenza and COVID19  Obesity/Overweight   The above risks/problems have been discussed with the patient.    I recommended the updated COVID-vaccine ASAP and the limbs of vaccine in late October.  Extreme need for aggressive weight loss discussed    Follow up actions/plans if indicated are seen below in the Assessment/Plan Section.  Pertinent information has been shared with the patient in the After Visit Summary.    Diagnoses and all orders for this visit:    1. Medicare annual wellness visit, subsequent (Primary)    2. Type 2 diabetes mellitus with both eyes affected by retinopathy without macular edema, without long-term current use of insulin, unspecified retinopathy severity (HCC)  -     CBC Auto Differential; Future  -     Comprehensive Metabolic Panel; Future  -     Hemoglobin A1c; Future  -     Lipid Panel; Future  -     Microalbumin / Creatinine Urine Ratio - Urine, Clean Catch; Future  -     Vitamin B12;  Future    3. Essential hypertension  -     Comprehensive Metabolic Panel; Future    4. Hypertriglyceridemia  -     Lipid Panel; Future    5. Type 2 diabetes mellitus with stage 3a chronic kidney disease, without long-term current use of insulin ([facility])  -     CBC Auto Differential; Future  -     Comprehensive Metabolic Panel; Future  -     Hemoglobin A1c; Future  -     Lipid Panel; Future  -     Microalbumin / Creatinine Urine Ratio - Urine, Clean Catch; Future  -     Vitamin B12; Future    6. Hyperlipidemia associated with type 2 diabetes mellitus (HCC)  -     Lipid Panel; Future    7. Diabetic peripheral neuropathy ([facility])  -     gabapentin (NEURONTIN) 600 MG tablet; Take 1 tablet by mouth Every Night.  Dispense: 30 tablet; Refill: 5  -     Hemoglobin A1c; Future    8. Chronic right shoulder pain    9. Stage 3a chronic kidney disease ([facility])  -     Comprehensive Metabolic Panel; Future  -     Vitamin D 25 Hydroxy; Future    10. Malignant tumor of prostate (CMS/[facility]) - h/o 2001  -     PSA DIAGNOSTIC; Future    11. Vitamin D deficiency  -     Vitamin D 25 Hydroxy; Future    12. Vitamin B12 deficiency  -     Vitamin B12; Future    13. Class 3 severe obesity due to excess calories with serious comorbidity and body mass index (BMI) of 40.0 to 44.9 in adult ([facility])        Follow Up:   Return in about 6 months (around 3/23/2023) for Follow up in six months with labs one week prior..     An After Visit Summary and PPPS were made available to the patient.          I spent 7 minutes caring for Eleno on this date of service. This time includes time spent by me in the following activities:preparing for the visit, performing a medically appropriate examination and/or evaluation , counseling and educating the patient/family/caregiver and documenting information in the medical record

## 2022-09-24 DIAGNOSIS — I25.10 CORONARY ARTERIOSCLEROSIS: ICD-10-CM

## 2022-09-24 DIAGNOSIS — E78.1 HYPERTRIGLYCERIDEMIA: ICD-10-CM

## 2022-09-28 RX ORDER — ROSUVASTATIN CALCIUM 20 MG/1
20 TABLET, COATED ORAL EVERY OTHER DAY
Qty: 45 TABLET | Refills: 3 | Status: SHIPPED | OUTPATIENT
Start: 2022-09-28

## 2022-09-28 RX ORDER — CLOPIDOGREL BISULFATE 75 MG/1
75 TABLET ORAL DAILY
Qty: 90 TABLET | Refills: 1 | Status: SHIPPED | OUTPATIENT
Start: 2022-09-28 | End: 2023-03-29

## 2022-10-17 ENCOUNTER — CLINICAL SUPPORT (OUTPATIENT)
Dept: FAMILY MEDICINE CLINIC | Facility: CLINIC | Age: 82
End: 2022-10-17

## 2022-10-17 DIAGNOSIS — Z23 NEED FOR INFLUENZA VACCINATION: Primary | ICD-10-CM

## 2022-10-17 PROCEDURE — 90662 IIV NO PRSV INCREASED AG IM: CPT | Performed by: INTERNAL MEDICINE

## 2022-10-17 PROCEDURE — G0008 ADMIN INFLUENZA VIRUS VAC: HCPCS | Performed by: INTERNAL MEDICINE

## 2022-10-26 RX ORDER — ISOSORBIDE MONONITRATE 30 MG/1
TABLET, EXTENDED RELEASE ORAL
Qty: 30 TABLET | Refills: 5 | Status: SHIPPED | OUTPATIENT
Start: 2022-10-26

## 2022-11-25 DIAGNOSIS — F41.1 GENERALIZED ANXIETY DISORDER: ICD-10-CM

## 2022-11-25 DIAGNOSIS — I10 ESSENTIAL HYPERTENSION: ICD-10-CM

## 2022-11-25 DIAGNOSIS — N40.1 BENIGN PROSTATIC HYPERPLASIA WITH NOCTURIA: ICD-10-CM

## 2022-11-25 DIAGNOSIS — E55.9 VITAMIN D DEFICIENCY: Primary | ICD-10-CM

## 2022-11-25 DIAGNOSIS — R35.1 BENIGN PROSTATIC HYPERPLASIA WITH NOCTURIA: ICD-10-CM

## 2022-11-28 RX ORDER — BUSPIRONE HYDROCHLORIDE 10 MG/1
TABLET ORAL
Qty: 60 TABLET | Refills: 5 | Status: SHIPPED | OUTPATIENT
Start: 2022-11-28

## 2022-11-28 RX ORDER — TOLTERODINE 4 MG/1
4 CAPSULE, EXTENDED RELEASE ORAL NIGHTLY
Qty: 30 CAPSULE | Refills: 5 | Status: SHIPPED | OUTPATIENT
Start: 2022-11-28

## 2022-11-28 RX ORDER — ACETAMINOPHEN 160 MG
TABLET,DISINTEGRATING ORAL
Qty: 12 CAPSULE | Refills: 3 | Status: SHIPPED | OUTPATIENT
Start: 2022-11-28

## 2022-11-28 RX ORDER — TRIAMTERENE AND HYDROCHLOROTHIAZIDE 37.5; 25 MG/1; MG/1
TABLET ORAL
Qty: 30 TABLET | Refills: 5 | Status: SHIPPED | OUTPATIENT
Start: 2022-11-28

## 2022-11-30 ENCOUNTER — OFFICE VISIT (OUTPATIENT)
Dept: FAMILY MEDICINE CLINIC | Facility: CLINIC | Age: 82
End: 2022-11-30

## 2022-11-30 VITALS
SYSTOLIC BLOOD PRESSURE: 134 MMHG | OXYGEN SATURATION: 94 % | DIASTOLIC BLOOD PRESSURE: 62 MMHG | HEART RATE: 79 BPM | TEMPERATURE: 98.4 F | HEIGHT: 68 IN | WEIGHT: 275 LBS | BODY MASS INDEX: 41.68 KG/M2

## 2022-11-30 DIAGNOSIS — I10 ESSENTIAL HYPERTENSION: Chronic | ICD-10-CM

## 2022-11-30 DIAGNOSIS — E66.01 CLASS 3 SEVERE OBESITY DUE TO EXCESS CALORIES WITH SERIOUS COMORBIDITY AND BODY MASS INDEX (BMI) OF 40.0 TO 44.9 IN ADULT: Chronic | ICD-10-CM

## 2022-11-30 DIAGNOSIS — E11.319 TYPE 2 DIABETES MELLITUS WITH BOTH EYES AFFECTED BY RETINOPATHY WITHOUT MACULAR EDEMA, WITHOUT LONG-TERM CURRENT USE OF INSULIN, UNSPECIFIED RETINOPATHY SEVERITY: Chronic | ICD-10-CM

## 2022-11-30 DIAGNOSIS — J20.9 ACUTE BRONCHITIS, UNSPECIFIED ORGANISM: ICD-10-CM

## 2022-11-30 DIAGNOSIS — J44.1 COPD WITH EXACERBATION: Primary | ICD-10-CM

## 2022-11-30 PROCEDURE — 99214 OFFICE O/P EST MOD 30 MIN: CPT | Performed by: INTERNAL MEDICINE

## 2022-11-30 RX ORDER — ALBUTEROL SULFATE 2.5 MG/3ML
2.5 SOLUTION RESPIRATORY (INHALATION) EVERY 4 HOURS PRN
Qty: 50 EACH | Refills: 3 | Status: SHIPPED | OUTPATIENT
Start: 2022-11-30 | End: 2023-02-02 | Stop reason: SDUPTHER

## 2022-11-30 RX ORDER — PREDNISONE 10 MG/1
TABLET ORAL
Qty: 24 TABLET | Refills: 0 | Status: SHIPPED | OUTPATIENT
Start: 2022-11-30 | End: 2023-03-13

## 2022-11-30 RX ORDER — AZITHROMYCIN 250 MG/1
TABLET, FILM COATED ORAL
Qty: 6 TABLET | Refills: 0 | Status: SHIPPED | OUTPATIENT
Start: 2022-11-30 | End: 2023-02-02

## 2022-12-27 RX ORDER — AMLODIPINE BESYLATE AND BENAZEPRIL HYDROCHLORIDE 5; 20 MG/1; MG/1
CAPSULE ORAL
Qty: 60 CAPSULE | Refills: 5 | Status: SHIPPED | OUTPATIENT
Start: 2022-12-27

## 2022-12-27 RX ORDER — TAMSULOSIN HYDROCHLORIDE 0.4 MG/1
1 CAPSULE ORAL NIGHTLY
Qty: 30 CAPSULE | Refills: 5 | Status: SHIPPED | OUTPATIENT
Start: 2022-12-27

## 2022-12-27 RX ORDER — CARVEDILOL 3.12 MG/1
TABLET ORAL
Qty: 30 TABLET | Refills: 5 | Status: SHIPPED | OUTPATIENT
Start: 2022-12-27

## 2023-01-25 RX ORDER — ALLOPURINOL 100 MG/1
TABLET ORAL
Qty: 30 TABLET | Refills: 5 | Status: SHIPPED | OUTPATIENT
Start: 2023-01-25

## 2023-01-25 RX ORDER — NITROGLYCERIN 0.4 MG/1
TABLET SUBLINGUAL
Qty: 25 TABLET | Refills: 11 | Status: SHIPPED | OUTPATIENT
Start: 2023-01-25

## 2023-02-02 ENCOUNTER — OFFICE VISIT (OUTPATIENT)
Dept: FAMILY MEDICINE CLINIC | Facility: CLINIC | Age: 83
End: 2023-02-02
Payer: MEDICARE

## 2023-02-02 VITALS
HEART RATE: 79 BPM | BODY MASS INDEX: 41.76 KG/M2 | SYSTOLIC BLOOD PRESSURE: 128 MMHG | WEIGHT: 275.54 LBS | DIASTOLIC BLOOD PRESSURE: 84 MMHG | TEMPERATURE: 98.8 F | OXYGEN SATURATION: 94 % | HEIGHT: 68 IN

## 2023-02-02 DIAGNOSIS — I10 ESSENTIAL HYPERTENSION: Chronic | ICD-10-CM

## 2023-02-02 DIAGNOSIS — E66.01 CLASS 3 SEVERE OBESITY DUE TO EXCESS CALORIES WITH SERIOUS COMORBIDITY AND BODY MASS INDEX (BMI) OF 40.0 TO 44.9 IN ADULT: Chronic | ICD-10-CM

## 2023-02-02 DIAGNOSIS — H65.05 RECURRENT ACUTE SEROUS OTITIS MEDIA OF LEFT EAR: ICD-10-CM

## 2023-02-02 DIAGNOSIS — H60.312 ACUTE DIFFUSE OTITIS EXTERNA OF LEFT EAR: Primary | ICD-10-CM

## 2023-02-02 DIAGNOSIS — J43.2 CENTRILOBULAR EMPHYSEMA: Chronic | ICD-10-CM

## 2023-02-02 PROCEDURE — 99214 OFFICE O/P EST MOD 30 MIN: CPT | Performed by: INTERNAL MEDICINE

## 2023-02-02 RX ORDER — FLUTICASONE FUROATE, UMECLIDINIUM BROMIDE AND VILANTEROL TRIFENATATE 100; 62.5; 25 UG/1; UG/1; UG/1
1 POWDER RESPIRATORY (INHALATION)
Qty: 1 EACH | Refills: 11 | Status: SHIPPED | OUTPATIENT
Start: 2023-02-02

## 2023-02-02 RX ORDER — NEOMYCIN SULFATE, POLYMYXIN B SULFATE AND HYDROCORTISONE 10; 3.5; 1 MG/ML; MG/ML; [USP'U]/ML
3 SUSPENSION/ DROPS AURICULAR (OTIC) 3 TIMES DAILY
Qty: 5 ML | Refills: 0 | Status: SHIPPED | OUTPATIENT
Start: 2023-02-02 | End: 2023-02-09

## 2023-02-02 RX ORDER — AZITHROMYCIN 250 MG/1
TABLET, FILM COATED ORAL
Qty: 6 TABLET | Refills: 0 | Status: SHIPPED | OUTPATIENT
Start: 2023-02-02 | End: 2023-03-13

## 2023-02-02 RX ORDER — ALBUTEROL SULFATE 2.5 MG/3ML
2.5 SOLUTION RESPIRATORY (INHALATION) EVERY 4 HOURS PRN
Qty: 50 EACH | Refills: 3 | Status: SHIPPED | OUTPATIENT
Start: 2023-02-02

## 2023-02-02 NOTE — PROGRESS NOTES
Chief Complaint  Ear Drainage (Left ear and popping sensation ), Shortness of Breath, and Cough    Subjective        Eleno Savage [facility]  History of Present Illness    Eleno reports some issues that have essentially persisted and is residual issues ever since he had respiratory tract infection and COPD exacerbation 2 months ago.  The patient required 3 different treatment courses in order to recover from those symptoms.  I saw him at the end of November for additional treatment after he had not adequately responded to interventions through urgent care nurse practitioners.  He is better, but feels that his respiratory status is not at goal.  He continues to use Advair discus inhaler twice daily and albuterol rescue inhaler as needed.  He has albuterol nebulizer treatments at home, but has not been using those.  He describes GUERRA and episodic cough and some wheezes.  He is not on an anticholinergic inhaler medication.  He describes daily sputum production, which is usually clear or white.  No fevers or chills.  We discussed options.    He has been experiencing left ear pressure and popping and occasional drainage.  He wears hearing aids and has had to have his left hearing aid serviced on 2 or 3 occasions recently, with him reporting that it was clogged with cerumen on each occasion.  The left ear canal has some discomfort episodically.  On exam today, there is evidence of left otitis media and left otitis externa.  There is no cerumen impaction noted on exam today.    His blood pressure is reasonably controlled today.  His weight is up 3 pounds in the past 4 months, but stable over the past 2 months.  BMI is still very high at 42.  His medical and physical issues resulted in him being quite sedentary    Objective   Vital Signs:  /84 (BP Location: Left arm, Patient Position: Sitting, Cuff Size: Large Adult)   Pulse 79   Temp 98.8 °F (37.1  "°C) (Tympanic)   Ht 172.7 cm (67.99\")   Wt 125 kg (275 lb 8.6 oz)   SpO2 94%   BMI 41.91 kg/m²   Estimated body mass index is 41.91 kg/m² as calculated from the following:    Height as of this encounter: 172.7 cm (67.99\").    Weight as of this encounter: 125 kg (275 lb 8.6 oz).             Physical Exam  Vitals and nursing note reviewed.   Constitutional:       General: He is not in acute distress.     Appearance: He is well-developed.      Comments: Pleasant male, obese.  Accompanied by wife.    HENT:      Head: Normocephalic and atraumatic.      Comments: Clear postnasal drip.  Crowded posterior oropharynx     Ears:      Comments: Right ear canal and TM unremarkable.  Left ear canal is slightly friable and demonstrates excessive moisture mixed with some squamous debris.  No cerumen impaction.  Left TM is injected with loss of light reflex     Nose:      Right Sinus: No maxillary sinus tenderness or frontal sinus tenderness.      Left Sinus: No maxillary sinus tenderness or frontal sinus tenderness.      Mouth/Throat:      Mouth: No oral lesions.      Pharynx: Uvula midline.      Tonsils: No tonsillar exudate.   Eyes:      Conjunctiva/sclera: Conjunctivae normal.      Pupils: Pupils are equal, round, and reactive to light.   Neck:      Thyroid: No thyroid mass or thyromegaly.      Vascular: No carotid bruit or JVD.      Trachea: Trachea normal. No tracheal deviation.   Cardiovascular:      Rate and Rhythm: Normal rate and regular rhythm.  No extrasystoles are present.     Chest Wall: PMI is not displaced.      Heart sounds: Normal heart sounds. No murmur heard.  Pulmonary:      Effort: Pulmonary effort is normal. No accessory muscle usage or respiratory distress.      Breath sounds: Wheezing present. No decreased breath sounds, rhonchi or rales.      Comments: Chronic lung sounds.  Some mild wheezes on expiratory phase of cough.  No crackles noted.  No rhonchi  Abdominal:      General: There is no distension. "      Palpations: Abdomen is soft.      Comments: Obese abdomen   Musculoskeletal:      Cervical back: Neck supple.   Lymphadenopathy:      Cervical: No cervical adenopathy.   Skin:     General: Skin is warm and dry.      Findings: No rash.      Nails: There is no clubbing.   Neurological:      Mental Status: He is alert and oriented to person, place, and time. Mental status is at baseline.      Cranial Nerves: No cranial nerve deficit.      Coordination: Coordination normal.   Psychiatric:         Mood and Affect: Mood normal.         Speech: Speech normal.         Behavior: Behavior normal.         Thought Content: Thought content normal.         Judgment: Judgment normal.        Result Review :                   Assessment and Plan   Diagnoses and all orders for this visit:    1. Acute diffuse otitis externa of left ear (Primary)    2. Recurrent acute serous otitis media of left ear    3. Centrilobular emphysema (HCC)    4. Essential hypertension    5. Class 3 severe obesity due to excess calories with serious comorbidity and body mass index (BMI) of 40.0 to 44.9 in adult (HCC)    Other orders  -     Fluticasone-Umeclidin-Vilant (Trelegy Ellipta) 100-62.5-25 MCG/ACT inhaler; Inhale 1 puff Daily.  Dispense: 1 each; Refill: 11  -     azithromycin (ZITHROMAX) 250 MG tablet; Take 2 tablets the first day, then 1 tablet daily for 4 days.  Dispense: 6 tablet; Refill: 0  -     neomycin-polymyxin-hydrocortisone (CORTISPORIN) 3.5-90032-4 otic suspension; Administer 3 drops into the left ear 3 (Three) Times a Day for 7 days.  Dispense: 5 mL; Refill: 0  -     albuterol (PROVENTIL) (2.5 MG/3ML) 0.083% nebulizer solution; Take 2.5 mg by nebulization Every 4 (Four) Hours As Needed for Wheezing or Shortness of Air.  Dispense: 50 each; Refill: 3    Cortisporin otic drops to the left ear canal for 1 week.  Z-Farhan as directed for the otitis media.  Leave the left hearing aid out until finished with the Cortisporin eardrops.  Irrigate  the left ear canal with warm water lavage at the end of the week before resuming that hearing aid.  Continue Flonase nasal spray for chronic postnasal drip and ETD tendencies    Continue current multidrug hypertension management.  Blood pressure is reasonably controlled today.    His COPD is not adequately controlled currently.  Change Advair to Trelegy inhaler.  Continue albuterol rescue inhaler.  I reminded him to use his albuterol nebulizer treatments more often when he is not at baseline.  I provided refills of the albuterol vials for his nebulizer.    Continue aggressive weight loss efforts.  His obesity is greatly impacting his overall health, but his weight and medical issues are certainly contributing to being more sedentary    Return to clinic sooner if not adequately improved, otherwise return to clinic in approximate 2 months with fasting labs prior.       I spent 33 minutes caring for Eleno on this date of service. This time includes time spent by me in the following activities:preparing for the visit, performing a medically appropriate examination and/or evaluation , counseling and educating the patient/family/caregiver, ordering medications, tests, or procedures, documenting information in the medical record and care coordination with his wife  Follow Up   No follow-ups on file.  Patient was given instructions and counseling regarding his condition or for health maintenance advice. Please see specific information pulled into the AVS if appropriate.

## 2023-02-23 DIAGNOSIS — E53.8 VITAMIN B12 DEFICIENCY: ICD-10-CM

## 2023-02-23 DIAGNOSIS — E11.42 DIABETIC PERIPHERAL NEUROPATHY: Chronic | ICD-10-CM

## 2023-02-23 DIAGNOSIS — F41.1 GENERALIZED ANXIETY DISORDER: Primary | ICD-10-CM

## 2023-02-23 DIAGNOSIS — K21.9 GASTROESOPHAGEAL REFLUX DISEASE, UNSPECIFIED WHETHER ESOPHAGITIS PRESENT: Chronic | ICD-10-CM

## 2023-02-23 RX ORDER — OMEPRAZOLE 40 MG/1
CAPSULE, DELAYED RELEASE ORAL
Qty: 60 CAPSULE | Refills: 5 | Status: SHIPPED | OUTPATIENT
Start: 2023-02-23

## 2023-02-23 RX ORDER — GABAPENTIN 600 MG/1
600 TABLET ORAL NIGHTLY
Qty: 30 TABLET | Refills: 5 | Status: SHIPPED | OUTPATIENT
Start: 2023-02-23

## 2023-02-23 RX ORDER — ESCITALOPRAM OXALATE 5 MG/1
5 TABLET ORAL EVERY MORNING
Qty: 30 TABLET | Refills: 5 | Status: SHIPPED | OUTPATIENT
Start: 2023-02-23

## 2023-02-23 RX ORDER — CETIRIZINE HYDROCHLORIDE 10 MG/1
10 TABLET ORAL DAILY PRN
Qty: 30 TABLET | Refills: 5 | Status: SHIPPED | OUTPATIENT
Start: 2023-02-23

## 2023-02-23 RX ORDER — CHOLECALCIFEROL (VITAMIN D3) 125 MCG
CAPSULE ORAL
Qty: 15 TABLET | Refills: 11 | OUTPATIENT
Start: 2023-02-23

## 2023-03-13 ENCOUNTER — OFFICE VISIT (OUTPATIENT)
Dept: FAMILY MEDICINE CLINIC | Facility: CLINIC | Age: 83
End: 2023-03-13
Payer: MEDICARE

## 2023-03-13 VITALS
HEIGHT: 68 IN | BODY MASS INDEX: 41.68 KG/M2 | SYSTOLIC BLOOD PRESSURE: 137 MMHG | WEIGHT: 275 LBS | DIASTOLIC BLOOD PRESSURE: 59 MMHG | OXYGEN SATURATION: 96 %

## 2023-03-13 DIAGNOSIS — E11.22 TYPE 2 DIABETES MELLITUS WITH STAGE 3A CHRONIC KIDNEY DISEASE, WITHOUT LONG-TERM CURRENT USE OF INSULIN: Chronic | ICD-10-CM

## 2023-03-13 DIAGNOSIS — U07.1 COVID-19 VIRUS INFECTION: ICD-10-CM

## 2023-03-13 DIAGNOSIS — N18.31 TYPE 2 DIABETES MELLITUS WITH STAGE 3A CHRONIC KIDNEY DISEASE, WITHOUT LONG-TERM CURRENT USE OF INSULIN: Chronic | ICD-10-CM

## 2023-03-13 DIAGNOSIS — J44.1 COPD WITH EXACERBATION: Primary | ICD-10-CM

## 2023-03-13 PROCEDURE — 3078F DIAST BP <80 MM HG: CPT | Performed by: INTERNAL MEDICINE

## 2023-03-13 PROCEDURE — 3075F SYST BP GE 130 - 139MM HG: CPT | Performed by: INTERNAL MEDICINE

## 2023-03-13 PROCEDURE — 99442 PR PHYS/QHP TELEPHONE EVALUATION 11-20 MIN: CPT | Performed by: INTERNAL MEDICINE

## 2023-03-13 RX ORDER — BENZONATATE 200 MG/1
200 CAPSULE ORAL 3 TIMES DAILY PRN
Qty: 30 CAPSULE | Refills: 0 | Status: SHIPPED | OUTPATIENT
Start: 2023-03-13

## 2023-03-13 RX ORDER — BENZONATATE 100 MG/1
100 CAPSULE ORAL 3 TIMES DAILY PRN
COMMUNITY
Start: 2023-03-06 | End: 2023-03-13

## 2023-03-13 NOTE — PROGRESS NOTES
"You have chosen to receive care through a telephone visit. Do you consent to use a telephone visit for your medical care today? Yes  I was located in my office and Eleno was located at his home at time of the televisit today    Chief Complaint  Cough (He tested positive on home test Covid 03/06/23. He had gone to [facility] urgent care prior to this and was given Tessalon and Prednisone. He has finished Pred a few days ago and has a few cough meds left. He is using neb treatments and inhaler.  Productive at times with white mucus ) and URI    Subjective        History of Present Illness     Eleno Savage receives care via telephone visit with lingering, although, improving upper respiratory symptoms.  He tested positive for COVID on home test 03/06/2023.  He was seen at [facility] earlier that day and treated for acute cough with prednisone 40 mg daily x 5 days completed 2 days ago and Tessalon Perles 100 mg for cough, which he has been taking 2 tablets.  He has been using albuterol in his nebulizer 3-4 times daily, which has been helping.          Objective   Vital Signs:  /59   Ht 172.7 cm (68\")   Wt 125 kg (275 lb)   SpO2 96%   BMI 41.81 kg/m²   Estimated body mass index is 41.81 kg/m² as calculated from the following:    Height as of this encounter: 172.7 cm (68\").    Weight as of this encounter: 125 kg (275 lb).             Physical Exam   Result Review :    Common labs    Common Labs 7/26/22 9/16/22 9/16/22 9/16/22 9/16/22 9/16/22 9/16/22     0815 0815 0815 0815 0815 0815   Glucose 129 (A)   119 (A)      BUN 21   15      Creatinine 1.47 (A)   1.20      Sodium 144   141      Potassium 4.1   3.9      Chloride 106   109      Calcium 9.2   8.8      Albumin 3.90   3.70      Total Bilirubin    0.4      Alkaline Phosphatase    92      AST (SGOT)    21      ALT (SGPT)    17      WBC  6.51        Hemoglobin  12.8 (A)        Hematocrit  39.0        Platelets  155        LDL Cholesterol       51  "   Hemoglobin A1C     6.30 (A)     PSA       0.090   Uric Acid   5.8       (A) Abnormal value            Data reviewed: [facility] 03/06/2023      Future Appointments   Date Time Provider Department Center   3/28/2023 10:30 AM Fabian Israel MD [facility]              Assessment and Plan   Diagnoses and all orders for this visit:    1. COPD with exacerbation (HCC) (Primary)    2. COVID-19 virus infection    3. Type 2 diabetes mellitus with stage 3a chronic kidney disease, without long-term current use of insulin (HCC)    Other orders  -     benzonatate (TESSALON) 200 MG capsule; Take 1 capsule by mouth 3 (Three) Times a Day As Needed for Cough for up to 30 doses.  Dispense: 30 capsule; Refill: 0           I spent 14 minutes caring for Eleno on this date of service. This time includes time spent by me in the following activities:preparing for the visit, reviewing tests, performing a medically appropriate examination and/or evaluation , counseling and educating the patient/family/caregiver, ordering medications, tests, or procedures, documenting information in the medical record and care coordination with his wife    Since patient feels respiratory/COPD symptoms are gradually improving, we will have him continue the Tessalon Perles for cough, prescription sent for Tessalon Perles 200 mg to take one t.i.d. for cough as needed.  Continue nebulizer treatments as needed until symptoms resolve.   Continue to check 02 sat episodically.  Notify us if he starts developing symptoms of secondary bacterial infection.    Continue current diabetic management.  Glucose control has returned to baseline now that he has completed the steroid    Return on the 28th of this month for routine follow up with fasting labs one week prior or sooner if needed.      Scribed for Dr. Israel by Lacy Bustos [facility].     Follow Up   Return if symptoms worsen or fail to improve, for Next scheduled follow up.  Patient was given  instructions and counseling regarding his condition or for health maintenance advice. Please see specific information pulled into the AVS if appropriate.

## 2023-03-21 ENCOUNTER — LAB (OUTPATIENT)
Dept: LAB | Facility: OTHER | Age: 83
End: 2023-03-21
Payer: MEDICARE

## 2023-03-21 DIAGNOSIS — N18.31 TYPE 2 DIABETES MELLITUS WITH STAGE 3A CHRONIC KIDNEY DISEASE, WITHOUT LONG-TERM CURRENT USE OF INSULIN: Chronic | ICD-10-CM

## 2023-03-21 DIAGNOSIS — E78.5 HYPERLIPIDEMIA ASSOCIATED WITH TYPE 2 DIABETES MELLITUS: Chronic | ICD-10-CM

## 2023-03-21 DIAGNOSIS — E11.42 DIABETIC PERIPHERAL NEUROPATHY: Chronic | ICD-10-CM

## 2023-03-21 DIAGNOSIS — C61 MALIGNANT TUMOR OF PROSTATE: Chronic | ICD-10-CM

## 2023-03-21 DIAGNOSIS — I10 ESSENTIAL HYPERTENSION: Chronic | ICD-10-CM

## 2023-03-21 DIAGNOSIS — E11.319 TYPE 2 DIABETES MELLITUS WITH BOTH EYES AFFECTED BY RETINOPATHY WITHOUT MACULAR EDEMA, WITHOUT LONG-TERM CURRENT USE OF INSULIN, UNSPECIFIED RETINOPATHY SEVERITY: Chronic | ICD-10-CM

## 2023-03-21 DIAGNOSIS — E53.8 VITAMIN B12 DEFICIENCY: ICD-10-CM

## 2023-03-21 DIAGNOSIS — E11.22 TYPE 2 DIABETES MELLITUS WITH STAGE 3A CHRONIC KIDNEY DISEASE, WITHOUT LONG-TERM CURRENT USE OF INSULIN: Chronic | ICD-10-CM

## 2023-03-21 DIAGNOSIS — E55.9 VITAMIN D DEFICIENCY: ICD-10-CM

## 2023-03-21 DIAGNOSIS — E78.1 HYPERTRIGLYCERIDEMIA: Chronic | ICD-10-CM

## 2023-03-21 DIAGNOSIS — E11.69 HYPERLIPIDEMIA ASSOCIATED WITH TYPE 2 DIABETES MELLITUS: Chronic | ICD-10-CM

## 2023-03-21 DIAGNOSIS — N18.31 STAGE 3A CHRONIC KIDNEY DISEASE: Chronic | ICD-10-CM

## 2023-03-21 LAB
ALBUMIN SERPL-MCNC: 3.8 G/DL (ref 3.5–5)
ALBUMIN/GLOB SERPL: 1.2 G/DL (ref 1.1–1.8)
ALP SERPL-CCNC: 110 U/L (ref 38–126)
ALT SERPL W P-5'-P-CCNC: 22 U/L
ANION GAP SERPL CALCULATED.3IONS-SCNC: 9 MMOL/L (ref 5–15)
AST SERPL-CCNC: 19 U/L (ref 17–59)
BASOPHILS # BLD AUTO: 0.02 10*3/MM3 (ref 0–0.2)
BASOPHILS NFR BLD AUTO: 0.2 % (ref 0–1.5)
BILIRUB SERPL-MCNC: 0.5 MG/DL (ref 0.2–1.3)
BUN SERPL-MCNC: 15 MG/DL (ref 7–23)
BUN/CREAT SERPL: 10.6 (ref 7–25)
CALCIUM SPEC-SCNC: 8.8 MG/DL (ref 8.4–10.2)
CHLORIDE SERPL-SCNC: 105 MMOL/L (ref 101–112)
CHOLEST SERPL-MCNC: 116 MG/DL (ref 150–200)
CO2 SERPL-SCNC: 27 MMOL/L (ref 22–30)
CREAT SERPL-MCNC: 1.42 MG/DL (ref 0.7–1.3)
DEPRECATED RDW RBC AUTO: 48.1 FL (ref 37–54)
EGFRCR SERPLBLD CKD-EPI 2021: 48.4 ML/MIN/1.73
EOSINOPHIL # BLD AUTO: 0.34 10*3/MM3 (ref 0–0.4)
EOSINOPHIL NFR BLD AUTO: 4.2 % (ref 0.3–6.2)
ERYTHROCYTE [DISTWIDTH] IN BLOOD BY AUTOMATED COUNT: 14.4 % (ref 12.3–15.4)
GLOBULIN UR ELPH-MCNC: 3.2 GM/DL (ref 2.3–3.5)
GLUCOSE SERPL-MCNC: 119 MG/DL (ref 70–99)
HCT VFR BLD AUTO: 39.7 % (ref 37.5–51)
HDLC SERPL-MCNC: 21 MG/DL (ref 40–59)
HGB BLD-MCNC: 12.6 G/DL (ref 13–17.7)
LDLC SERPL CALC-MCNC: 59 MG/DL
LDLC/HDLC SERPL: 2.42 {RATIO} (ref 0–3.55)
LYMPHOCYTES # BLD AUTO: 1.82 10*3/MM3 (ref 0.7–3.1)
LYMPHOCYTES NFR BLD AUTO: 22.6 % (ref 19.6–45.3)
MCH RBC QN AUTO: 30.1 PG (ref 26.6–33)
MCHC RBC AUTO-ENTMCNC: 31.7 G/DL (ref 31.5–35.7)
MCV RBC AUTO: 95 FL (ref 79–97)
MONOCYTES # BLD AUTO: 0.63 10*3/MM3 (ref 0.1–0.9)
MONOCYTES NFR BLD AUTO: 7.8 % (ref 5–12)
NEUTROPHILS NFR BLD AUTO: 5.26 10*3/MM3 (ref 1.7–7)
NEUTROPHILS NFR BLD AUTO: 65.2 % (ref 42.7–76)
PLATELET # BLD AUTO: 236 10*3/MM3 (ref 140–450)
PMV BLD AUTO: 11 FL (ref 6–12)
POTASSIUM SERPL-SCNC: 4 MMOL/L (ref 3.4–5)
PROT SERPL-MCNC: 7 G/DL (ref 6.3–8.6)
RBC # BLD AUTO: 4.18 10*6/MM3 (ref 4.14–5.8)
SODIUM SERPL-SCNC: 141 MMOL/L (ref 137–145)
TRIGL SERPL-MCNC: 221 MG/DL
VLDLC SERPL-MCNC: 36 MG/DL (ref 5–40)
WBC NRBC COR # BLD: 8.07 10*3/MM3 (ref 3.4–10.8)

## 2023-03-21 PROCEDURE — 80061 LIPID PANEL: CPT | Performed by: INTERNAL MEDICINE

## 2023-03-21 PROCEDURE — 85025 COMPLETE CBC W/AUTO DIFF WBC: CPT | Performed by: INTERNAL MEDICINE

## 2023-03-21 PROCEDURE — 82043 UR ALBUMIN QUANTITATIVE: CPT | Performed by: INTERNAL MEDICINE

## 2023-03-21 PROCEDURE — 82570 ASSAY OF URINE CREATININE: CPT | Performed by: INTERNAL MEDICINE

## 2023-03-21 PROCEDURE — 84153 ASSAY OF PSA TOTAL: CPT | Performed by: INTERNAL MEDICINE

## 2023-03-21 PROCEDURE — 82607 VITAMIN B-12: CPT | Performed by: INTERNAL MEDICINE

## 2023-03-21 PROCEDURE — 82306 VITAMIN D 25 HYDROXY: CPT | Performed by: INTERNAL MEDICINE

## 2023-03-21 PROCEDURE — 83036 HEMOGLOBIN GLYCOSYLATED A1C: CPT | Performed by: INTERNAL MEDICINE

## 2023-03-21 PROCEDURE — 80053 COMPREHEN METABOLIC PANEL: CPT | Performed by: INTERNAL MEDICINE

## 2023-03-21 PROCEDURE — 36415 COLL VENOUS BLD VENIPUNCTURE: CPT | Performed by: INTERNAL MEDICINE

## 2023-03-22 LAB
25(OH)D3 SERPL-MCNC: 51.6 NG/ML (ref 30–100)
ALBUMIN UR-MCNC: <1.2 MG/DL
CREAT UR-MCNC: 44.7 MG/DL
HBA1C MFR BLD: 6.2 % (ref 4.8–5.6)
MICROALBUMIN/CREAT UR: NORMAL MG/G{CREAT}
PSA SERPL-MCNC: 0.1 NG/ML (ref 0–4)
VIT B12 BLD-MCNC: 733 PG/ML (ref 211–946)

## 2023-03-28 ENCOUNTER — OFFICE VISIT (OUTPATIENT)
Dept: FAMILY MEDICINE CLINIC | Facility: CLINIC | Age: 83
End: 2023-03-28
Payer: MEDICARE

## 2023-03-28 VITALS
WEIGHT: 270.6 LBS | OXYGEN SATURATION: 91 % | DIASTOLIC BLOOD PRESSURE: 64 MMHG | BODY MASS INDEX: 41.01 KG/M2 | HEART RATE: 78 BPM | SYSTOLIC BLOOD PRESSURE: 110 MMHG | HEIGHT: 68 IN

## 2023-03-28 DIAGNOSIS — E11.69 HYPERLIPIDEMIA ASSOCIATED WITH TYPE 2 DIABETES MELLITUS: Chronic | ICD-10-CM

## 2023-03-28 DIAGNOSIS — E53.8 VITAMIN B12 DEFICIENCY: Chronic | ICD-10-CM

## 2023-03-28 DIAGNOSIS — E78.5 HYPERLIPIDEMIA ASSOCIATED WITH TYPE 2 DIABETES MELLITUS: Chronic | ICD-10-CM

## 2023-03-28 DIAGNOSIS — I25.10 CORONARY ARTERIOSCLEROSIS: Chronic | ICD-10-CM

## 2023-03-28 DIAGNOSIS — I10 ESSENTIAL HYPERTENSION: Chronic | ICD-10-CM

## 2023-03-28 DIAGNOSIS — E55.9 VITAMIN D DEFICIENCY: Chronic | ICD-10-CM

## 2023-03-28 DIAGNOSIS — E79.0 HYPERURICEMIA: Chronic | ICD-10-CM

## 2023-03-28 DIAGNOSIS — N18.31 TYPE 2 DIABETES MELLITUS WITH STAGE 3A CHRONIC KIDNEY DISEASE, WITHOUT LONG-TERM CURRENT USE OF INSULIN: Primary | Chronic | ICD-10-CM

## 2023-03-28 DIAGNOSIS — N18.31 STAGE 3A CHRONIC KIDNEY DISEASE: Chronic | ICD-10-CM

## 2023-03-28 DIAGNOSIS — J43.2 CENTRILOBULAR EMPHYSEMA: Chronic | ICD-10-CM

## 2023-03-28 DIAGNOSIS — C61 MALIGNANT TUMOR OF PROSTATE: Chronic | ICD-10-CM

## 2023-03-28 DIAGNOSIS — E66.01 CLASS 3 SEVERE OBESITY DUE TO EXCESS CALORIES WITH SERIOUS COMORBIDITY AND BODY MASS INDEX (BMI) OF 40.0 TO 44.9 IN ADULT: Chronic | ICD-10-CM

## 2023-03-28 DIAGNOSIS — E11.22 TYPE 2 DIABETES MELLITUS WITH STAGE 3A CHRONIC KIDNEY DISEASE, WITHOUT LONG-TERM CURRENT USE OF INSULIN: Primary | Chronic | ICD-10-CM

## 2023-03-28 DIAGNOSIS — E11.319 DIABETIC RETINOPATHY OF BOTH EYES WITHOUT MACULAR EDEMA ASSOCIATED WITH TYPE 2 DIABETES MELLITUS, UNSPECIFIED RETINOPATHY SEVERITY: Chronic | ICD-10-CM

## 2023-03-28 DIAGNOSIS — E11.319 TYPE 2 DIABETES MELLITUS WITH BOTH EYES AFFECTED BY RETINOPATHY WITHOUT MACULAR EDEMA, WITHOUT LONG-TERM CURRENT USE OF INSULIN, UNSPECIFIED RETINOPATHY SEVERITY: Chronic | ICD-10-CM

## 2023-03-28 NOTE — PROGRESS NOTES
Chief Complaint  Hyperlipidemia; Essential hypertension; Hyperlipidemia associated with type 2 diabetes mellitus (HC; Type 2 diabetes mellitus with stage 3 chronic kidney diseas; Class 3 severe obesity due to excess calories with serious ; GERD (gastroesophageal reflux disease); Chronic kidney disease, stage 3 (HCC); and Degeneration of intervertebral disc of lumbosacral region    Subjective        History of Present Illness     Eleno Savage presents to the office for 6-month follow up on type 2 diabetes mellitus, stage 2 chronic kidney disease, obesity, COPD, GERD, vitamin D deficiency, and high cholesterol/high triglycerides. Patient has a history of prostate cancer with no evidence of recurrence.  PSA remains undetectable. He follows with Dr. Weathers, nephrologist, for CKD every 6 months. Creatinine remains at his baseline with creatinine 1.4.    He recently tested positive for COVID 03/06/2023, which was his second time to have COVID.  His rather severe symptoms gradually improving, although, he continues to have lingering cough.    He continues use of nebulizer and inhalers to manage COPD.      Patient had colonoscopy 02/08/2021 by Dr. Erickson revealing one 1 small tubular adenomatous polyp as well as diverticulosis sigmoid, ascending and descending colon.  He will be due repeat colonoscopy next winter (02/2024)    Weight is down 2 pounds in the past six months.  Diabetes excellent control.  He continues on Neurontin each evening for diabetic neuropathy and denies significant side effects including excessive daytime sedation.  Patient understands the risks associated with this controlled medication, including tolerance and addiction    Lab results are reviewed with the patient today.  CBC unremarkable.  Vitamin D and B-12 with oral supplements.   Normal liver function.  Cholesterol at goal with Crestor, although, triglycerides remain above goal.         Objective   Vital Signs:  /64 (BP Location: Left  "arm, Patient Position: Sitting, Cuff Size: Large Adult)   Pulse 78   Ht 172.7 cm (67.99\")   Wt 123 kg (270 lb 9.6 oz)   SpO2 91%   BMI 41.15 kg/m²   Estimated body mass index is 41.15 kg/m² as calculated from the following:    Height as of this encounter: 172.7 cm (67.99\").    Weight as of this encounter: 123 kg (270 lb 9.6 oz).             Physical Exam  Vitals reviewed.   Constitutional:       General: He is not in acute distress.     Appearance: He is well-developed.      Comments: Pleasant male, morbidly obese.  Accompanied by wife, Yoselin.    HENT:      Head: Normocephalic and atraumatic.      Nose:      Right Sinus: No maxillary sinus tenderness or frontal sinus tenderness.      Left Sinus: No maxillary sinus tenderness or frontal sinus tenderness.      Mouth/Throat:      Mouth: No oral lesions.      Pharynx: Uvula midline.      Tonsils: No tonsillar exudate.   Eyes:      Conjunctiva/sclera: Conjunctivae normal.      Pupils: Pupils are equal, round, and reactive to light.   Neck:      Thyroid: No thyroid mass or thyromegaly.      Vascular: No carotid bruit or JVD.      Trachea: Trachea normal. No tracheal deviation.   Cardiovascular:      Rate and Rhythm: Normal rate and regular rhythm.  No extrasystoles are present.     Chest Wall: PMI is not displaced.      Heart sounds: Normal heart sounds. No murmur heard.  Pulmonary:      Effort: Pulmonary effort is normal. No accessory muscle usage or respiratory distress.      Breath sounds: Normal breath sounds. No decreased breath sounds, wheezing, rhonchi or rales.      Comments: Chronic lung sounds.    Abdominal:      General: Bowel sounds are normal. There is no distension.      Palpations: Abdomen is soft.      Tenderness: There is no abdominal tenderness.      Comments: Obese abdomen limits exam.     Musculoskeletal:      Cervical back: Neck supple.   Feet:      Comments: Pulses 1+ with 1+ edema bilateral ankles.   Lymphadenopathy:      Cervical: No cervical " adenopathy.   Skin:     General: Skin is warm and dry.      Findings: No rash.      Nails: There is no clubbing.   Neurological:      Mental Status: He is alert and oriented to person, place, and time.      Cranial Nerves: No cranial nerve deficit.      Coordination: Coordination normal.   Psychiatric:         Speech: Speech normal.         Behavior: Behavior normal.         Thought Content: Thought content normal.         Judgment: Judgment normal.            Result Review :    CMP    CMP 7/26/22 9/16/22 3/21/23   Glucose 129 (A) 119 (A) 119 (A)   BUN 21 15 15   Creatinine 1.47 (A) 1.20 1.42 (A)   eGFR 46.7 (A) 59.6 (A) 48.4 (A)   Sodium 144 141 141   Potassium 4.1 3.9 4.0   Chloride 106 109 105   Calcium 9.2 8.8 8.8   Total Protein  6.5 7.0   Albumin 3.90 3.70 3.8   Globulin  2.8 3.2   Total Bilirubin  0.4 0.5   Alkaline Phosphatase  92 110   AST (SGOT)  21 19   ALT (SGPT)  17 22   Albumin/Globulin Ratio  1.3 1.2   BUN/Creatinine Ratio 14.3 12.5 10.6   Anion Gap 10.0 6.0 9.0   (A) Abnormal value       Comments are available for some flowsheets but are not being displayed.           CBC w/diff    CBC w/Diff 9/16/22 3/21/23   WBC 6.51 8.07   RBC 4.17 4.18   Hemoglobin 12.8 (A) 12.6 (A)   Hematocrit 39.0 39.7   MCV 93.5 95.0   MCH 30.7 30.1   MCHC 32.8 31.7   RDW 14.5 14.4   Platelets 155 236   Neutrophil Rel % 56.2 65.2   Lymphocyte Rel % 30.0 22.6   Monocyte Rel % 8.6 7.8   Eosinophil Rel % 4.9 4.2   Basophil Rel % 0.3 0.2   (A) Abnormal value            Lipid Panel    Lipid Panel 9/16/22 3/21/23   Total Cholesterol  116 (A)   Triglycerides  221 (A)   HDL Cholesterol  21 (A)   VLDL Cholesterol  36   LDL Cholesterol  51 59   LDL/HDL Ratio  2.42   (A) Abnormal value            TSH    TSH 9/16/22   TSH 1.420           A1C Last 3 Results    HGBA1C Last 3 Results 9/16/22 3/21/23   Hemoglobin A1C 6.30 (A) 6.20 (A)   (A) Abnormal value            PSA    PSA 9/16/22 3/21/23   PSA 0.090 0.105           Data reviewed: GI  studies colonoscopy 02/08/2021 by Dr. Erickson              Assessment and Plan   Diagnoses and all orders for this visit:    1. Type 2 diabetes mellitus with stage 3a chronic kidney disease, without long-term current use of insulin ([facility]) (Primary)  -     CBC Auto Differential; Future  -     Comprehensive Metabolic Panel; Future  -     Hemoglobin A1c; Future  -     LDL Cholesterol, Direct; Future  -     TSH; Future  -     Uric Acid; Future  -     Vitamin B12; Future  -     Triglycerides; Future    2. Type 2 diabetes mellitus with both eyes affected by retinopathy without macular edema, without long-term current use of insulin, unspecified retinopathy severity ([facility])  -     Hemoglobin A1c; Future    3. Hyperlipidemia associated with type 2 diabetes mellitus ([facility])  -     LDL Cholesterol, Direct; Future  -     Triglycerides; Future    4. Class 3 severe obesity due to excess calories with serious comorbidity and body mass index (BMI) of 40.0 to 44.9 in adult ([facility])  -     Comprehensive Metabolic Panel; Future    5. Essential hypertension  -     Comprehensive Metabolic Panel; Future    6. Coronary arteriosclerosis - followed by Dr. Farooq  -     LDL Cholesterol, Direct; Future    7. Vitamin B12 deficiency  -     CBC Auto Differential; Future  -     Vitamin B12; Future    8. Vitamin D deficiency    9. Diabetic retinopathy of both eyes without macular edema associated with type 2 diabetes mellitus, unspecified retinopathy severity ([facility])    10. Stage 3a chronic kidney disease ([facility])  -     Comprehensive Metabolic Panel; Future    11. Malignant tumor of prostate (CMS/[facility]) - h/o 2001  -     PSA DIAGNOSTIC; Future    12. Hyperuricemia  -     Uric Acid; Future    13. Centrilobular emphysema ([facility])  -     CBC Auto Differential; Future  -     Comprehensive Metabolic Panel; Future                Diabetes excellent control, but still needs to pursue aggressive weight loss.  Pursue aggressive weight loss to help improve breathing/COPD  symptoms, lower triglycerides, and manage diabetes.  Continue Neurontin for diabetic neuropathy.     Continue current BP medications.  Blood pressure is at goal.  Pursue sodium restriction and weight loss.  Monitor BP and notify me if not consistently at goal.    Continuel B-12 500 mg q.o.d., at goal.  Continue vitamin D supplement, at goal.      Continue  Lexapro 5 mg q.a.m. for JAMAL and depression, stable.       Continue Crestor.  LDL cholesterol at goal, although, triglycerides above goal.  Aggressive weight loss will help lower triglycerides as noted above.  Keep cardiology follow up visits as scheduled with Dr. Mesa.  Continue the current cardiovascular risk factor modifications      Continue nebulizer treatments and inhalers to manage his COPD.  Symptoms were flared with recent COVID, but are gradually improving.      There is no evidence of recurrence of prostate cancer.  We will continue to follow diagnostic PSA, which is undetectable    Return in 6 months for routine follow up with fasting labs one week prior or sooner if needed.      Scribed for Dr. Israel by Lacy Bustos [facility].     Follow Up   Return in about 6 months (around 9/28/2023) for Follow up in six months with labs one week prior..  Patient was given instructions and counseling regarding his condition or for health maintenance advice. Please see specific information pulled into the AVS if appropriate.

## 2023-03-29 DIAGNOSIS — I25.10 CORONARY ARTERIOSCLEROSIS: ICD-10-CM

## 2023-03-29 RX ORDER — CLOPIDOGREL BISULFATE 75 MG/1
75 TABLET ORAL DAILY
Qty: 90 TABLET | Refills: 1 | Status: SHIPPED | OUTPATIENT
Start: 2023-03-29

## 2023-04-26 RX ORDER — ISOSORBIDE MONONITRATE 30 MG/1
TABLET, EXTENDED RELEASE ORAL
Qty: 30 TABLET | Refills: 5 | Status: SHIPPED | OUTPATIENT
Start: 2023-04-26

## 2023-05-22 DIAGNOSIS — R35.1 BENIGN PROSTATIC HYPERPLASIA WITH NOCTURIA: ICD-10-CM

## 2023-05-22 DIAGNOSIS — I10 ESSENTIAL HYPERTENSION: ICD-10-CM

## 2023-05-22 DIAGNOSIS — N40.1 BENIGN PROSTATIC HYPERPLASIA WITH NOCTURIA: ICD-10-CM

## 2023-05-22 DIAGNOSIS — F41.1 GENERALIZED ANXIETY DISORDER: ICD-10-CM

## 2023-05-23 RX ORDER — TRIAMTERENE AND HYDROCHLOROTHIAZIDE 37.5; 25 MG/1; MG/1
TABLET ORAL
Qty: 30 TABLET | Refills: 5 | Status: SHIPPED | OUTPATIENT
Start: 2023-05-23

## 2023-05-23 RX ORDER — TOLTERODINE 4 MG/1
4 CAPSULE, EXTENDED RELEASE ORAL NIGHTLY
Qty: 30 CAPSULE | Refills: 5 | Status: SHIPPED | OUTPATIENT
Start: 2023-05-23

## 2023-05-23 RX ORDER — BUSPIRONE HYDROCHLORIDE 10 MG/1
TABLET ORAL
Qty: 60 TABLET | Refills: 5 | Status: SHIPPED | OUTPATIENT
Start: 2023-05-23

## 2023-07-24 RX ORDER — ALLOPURINOL 100 MG/1
TABLET ORAL
Qty: 30 TABLET | Refills: 5 | Status: SHIPPED | OUTPATIENT
Start: 2023-07-24

## 2023-07-28 ENCOUNTER — TRANSCRIBE ORDERS (OUTPATIENT)
Dept: ORTHOPEDIC SURGERY | Facility: CLINIC | Age: 83
End: 2023-07-28
Payer: MEDICARE

## 2023-07-28 DIAGNOSIS — M25.561 RIGHT KNEE PAIN, UNSPECIFIED CHRONICITY: Primary | ICD-10-CM

## 2023-08-01 ENCOUNTER — TRANSCRIBE ORDERS (OUTPATIENT)
Dept: LAB | Facility: OTHER | Age: 83
End: 2023-08-01
Payer: MEDICARE

## 2023-08-01 ENCOUNTER — LAB (OUTPATIENT)
Dept: LAB | Facility: OTHER | Age: 83
End: 2023-08-01
Payer: MEDICARE

## 2023-08-01 DIAGNOSIS — N18.31 STAGE 3A CHRONIC KIDNEY DISEASE: ICD-10-CM

## 2023-08-01 DIAGNOSIS — N18.31 STAGE 3A CHRONIC KIDNEY DISEASE: Primary | ICD-10-CM

## 2023-08-01 LAB
ALBUMIN SERPL-MCNC: 3.9 G/DL (ref 3.5–5)
ANION GAP SERPL CALCULATED.3IONS-SCNC: 6 MMOL/L (ref 5–15)
BUN SERPL-MCNC: 25 MG/DL (ref 7–23)
BUN/CREAT SERPL: 19.7 (ref 7–25)
CALCIUM SPEC-SCNC: 9.3 MG/DL (ref 8.4–10.2)
CHLORIDE SERPL-SCNC: 105 MMOL/L (ref 101–112)
CO2 SERPL-SCNC: 30 MMOL/L (ref 22–30)
CREAT SERPL-MCNC: 1.27 MG/DL (ref 0.7–1.3)
CREAT UR-MCNC: 53.8 MG/DL
EGFRCR SERPLBLD CKD-EPI 2021: 55.4 ML/MIN/1.73
GLUCOSE SERPL-MCNC: 128 MG/DL (ref 70–99)
PHOSPHATE SERPL-MCNC: 3.9 MG/DL (ref 2.5–4.5)
POTASSIUM SERPL-SCNC: 4.3 MMOL/L (ref 3.4–5)
PROT ?TM UR-MCNC: 6.3 MG/DL
PROT/CREAT UR: 117.1 MG/G CREA (ref 0–200)
SODIUM SERPL-SCNC: 141 MMOL/L (ref 137–145)

## 2023-08-01 PROCEDURE — 82570 ASSAY OF URINE CREATININE: CPT | Performed by: INTERNAL MEDICINE

## 2023-08-01 PROCEDURE — 80069 RENAL FUNCTION PANEL: CPT | Performed by: INTERNAL MEDICINE

## 2023-08-01 PROCEDURE — 36415 COLL VENOUS BLD VENIPUNCTURE: CPT | Performed by: INTERNAL MEDICINE

## 2023-08-01 PROCEDURE — 84156 ASSAY OF PROTEIN URINE: CPT | Performed by: INTERNAL MEDICINE

## 2023-08-10 ENCOUNTER — OFFICE VISIT (OUTPATIENT)
Dept: ORTHOPEDIC SURGERY | Facility: CLINIC | Age: 83
End: 2023-08-10
Payer: MEDICARE

## 2023-08-10 VITALS — BODY MASS INDEX: 40.01 KG/M2 | WEIGHT: 264 LBS | HEIGHT: 68 IN

## 2023-08-10 DIAGNOSIS — N18.31 STAGE 3A CHRONIC KIDNEY DISEASE: ICD-10-CM

## 2023-08-10 DIAGNOSIS — M25.561 ACUTE PAIN OF RIGHT KNEE: ICD-10-CM

## 2023-08-10 DIAGNOSIS — E66.01 MORBID OBESITY WITH BMI OF 40.0-44.9, ADULT: ICD-10-CM

## 2023-08-10 DIAGNOSIS — M23.91 INTERNAL DERANGEMENT OF RIGHT KNEE: Primary | ICD-10-CM

## 2023-08-10 PROCEDURE — 99204 OFFICE O/P NEW MOD 45 MIN: CPT | Performed by: ORTHOPAEDIC SURGERY

## 2023-08-10 RX ORDER — SACCHAROMYCES BOULARDII 250 MG
250 CAPSULE ORAL 2 TIMES DAILY
COMMUNITY

## 2023-08-10 RX ORDER — ESZOPICLONE 3 MG/1
3 TABLET, FILM COATED ORAL NIGHTLY
COMMUNITY

## 2023-08-10 RX ORDER — FOLIC ACID 1 MG/1
1 TABLET ORAL DAILY
COMMUNITY

## 2023-08-10 NOTE — PROGRESS NOTES
Eleno Savage is a 83 y.o. male   Primary provider:  Fabian Israel MD       Chief Complaint   Patient presents with    Right Knee - Pain    Initial Evaluation     [facility] 7/28/23       HISTORY OF PRESENT ILLNESS:    Patient states that he has been having pain in the right knee for approximately 3-1/2 weeks.  Patient states that his pain is worse with weightbearing.  It is a little bit better than it was to begin with but not significantly.  No specific injury that he recalls.  Pain mostly associated along the inferior aspect of the patella.  He has tried diclofenac gel, Salonpas, steroid pack, lidocaine patches, and ice.  No numbness or tingling.  He has significant pain with activities of daily living.    Pain  This is a new problem. Episode onset: 3.5 months. The problem occurs constantly. Associated symptoms include joint swelling and numbness. Associated symptoms comments: Aching, swelling. The symptoms are aggravated by walking and standing (sitting, driving). He has tried acetaminophen, ice, heat and rest for the symptoms. The treatment provided mild relief.      CONCURRENT MEDICAL HISTORY:    Past Medical History:   Diagnosis Date    Achilles tendinitis     Acquired hallux rigidus     Acute otitis media, left     After cataract not obscuring vision     After-cataract with vision obscured     left mild       Allergic rhinitis      On Zyrtec. Resumed her Flonase and added phenylephrine, 11/2015      Artificial lens present     Backache     Benign prostatic hyperplasia      S/P TUR 2001. On Flomax.     Bilateral pseudophakia     Chronic kidney disease, stage 3     Baseline creatinine 1.7    Chronic obstructive lung disease     Contracture of ankle and foot joint     Equinus    Coronary arteriosclerosis     S/P PTCA/stent 2005. Dr. Farooq       Degeneration of intervertebral disc of lumbosacral region     Added Neurontin and hydrocodone, 5/20    Degenerative joint disease involving multiple joints      Diabetic retinopathy     Diverticular disease of colon     Encounter for screening for malignant neoplasm of prostate 01/03/2014    Essential hypertension     Exostosis     Fracture of coccyx     sequela - Likely old       Generalized anxiety disorder     GERD (gastroesophageal reflux disease)     Omeprazole 40 mg twice a day, 11/2015       Gout     On allopurinol       Hearing loss     Hemorrhoids     Hyperlipidemia     Patient reports he discontinued Lipitor 2014. Declines other statins       Hyperlipidemia associated with type 2 diabetes mellitus 2/26/2021    Hypertriglyceridemia     On TriCor       Hyperuricemia     Improved with allopurinol       Insomnia     Lumbar radiculopathy     Lumbar DJD primarily sensory radiculopathy left lower extremity    Malignant tumor of prostate     2001,history of. No evidence of recurrence.      Non-organic sleep disorder     On Lunesta and trazodone       Obesity     Obstructive sleep apnea syndrome     Primary localized osteoarthrosis, ankle and foot     Radiculopathy, lumbar region     Rupture of Achilles tendon     Skin sensation disturbance     numbness & tingling in left leg       Sleep disorder 7/30/2019    Type 2 diabetes mellitus     Diet controlled. No metformin due to chronic kidney disease    Type 2 diabetes mellitus with both eyes affected by retinopathy without macular edema, without long-term current use of insulin 8/10/2020    Type 2 diabetes mellitus with stage 2 chronic kidney disease, without long-term current use of insulin     Diet controlled. No metformin due to chronic kidney disease    Vitamin D deficiency     On vitamin D every other day          Allergies   Allergen Reactions    Bee Venom Anaphylaxis    Aminoglycosides Unknown - Low Severity    Atorvastatin Myalgia    Bextra [Valdecoxib] Myalgia    Celebrex [Celecoxib] Unknown - Low Severity    Contrast Dye (Echo Or Unknown Ct/Mr) Unknown - Low Severity    Indocin [Indomethacin] Unknown - Low  Severity    Mobic [Meloxicam] Unknown - Low Severity    Motrin [Ibuprofen] Unknown - Low Severity    Niaspan [Niacin Er] Unknown - Low Severity    Nsaids Unknown - Low Severity    Vioxx [Rofecoxib] Unknown - Low Severity         Current Outpatient Medications:     Acetaminophen (TYLENOL EXTRA STRENGTH PO), Take  by mouth as needed., Disp: , Rfl:     albuterol (PROVENTIL) (2.5 MG/3ML) 0.083% nebulizer solution, Take 2.5 mg by nebulization Every 4 (Four) Hours As Needed for Wheezing or Shortness of Air., Disp: 50 each, Rfl: 3    allopurinol (ZYLOPRIM) 100 MG tablet, TAKE 1 TABLET BY MOUTH DAILY. FOR HIGH URIC ACID, Disp: 30 tablet, Rfl: 5    amLODIPine-benazepril (LOTREL 5-20) 5-20 MG per capsule, TAKE 1 CAPSULES BY MOUTH TWO TIMES A DAY *EQUIPP/HUMANA*, Disp: 60 capsule, Rfl: 5    aspirin 325 MG tablet, Take 1 tablet by mouth Daily., Disp: , Rfl:     busPIRone (BUSPAR) 10 MG tablet, TAKE ONE TAB TWICE DAILY, Disp: 60 tablet, Rfl: 5    Calcium Carbonate Antacid (TUMS ULTRA PO), Take  by mouth., Disp: , Rfl:     carvedilol (COREG) 3.125 MG tablet, 1 TAB(S) BY MOUTH DAILY **HUMANA**, Disp: 30 tablet, Rfl: 5    cetirizine (zyrTEC) 10 MG tablet, TAKE 1 TABLET BY MOUTH DAILY AS NEEDED FOR ALLERGIES. FOR ALLERGIES, Disp: 30 tablet, Rfl: 5    Cholecalciferol (Vitamin D3) 50 MCG (2000 UT) capsule, ONE CAPSULE MONDAY, WED AND FRIDAY, Disp: 12 capsule, Rfl: 3    CloNIDine (CATAPRES) 0.1 MG tablet, Take 1 tablet as needed for systolic blood pressure greater than 140. May repeat in 4 hours if needed. Maximum of 2 doses daily., Disp: 60 tablet, Rfl: 6    clopidogrel (PLAVIX) 75 MG tablet, TAKE 1 TABLET BY MOUTH DAILY., Disp: 90 tablet, Rfl: 1    Coenzyme Q10 100 MG tablet, 1-2 tablets daily, Disp: , Rfl:     escitalopram (LEXAPRO) 5 MG tablet, TAKE 1 TABLET BY MOUTH EVERY MORNING., Disp: 30 tablet, Rfl: 5    eszopiclone (LUNESTA) 3 MG tablet, Take 1 tablet by mouth Every Night. Take immediately before bedtime, Disp: , Rfl:      fluticasone (FLONASE) 50 MCG/ACT nasal spray, 1 SPRAY INTO EACH NOSTRIL 2 (TWO) TIMES A DAY FOR 30 DAYS. ADMINISTER 2 SPRAYS IN EACH NOSTRIL FOR EACH DOSE., Disp: 16 g, Rfl: 5    Fluticasone-Umeclidin-Vilant (Trelegy Ellipta) 100-62.5-25 MCG/ACT inhaler, Inhale 1 puff Daily., Disp: 1 each, Rfl: 11    folic acid (FOLVITE) 1 MG tablet, Take 1 tablet by mouth Daily., Disp: , Rfl:     gabapentin (NEURONTIN) 600 MG tablet, TAKE 1 TABLET BY MOUTH EVERY NIGHT., Disp: 30 tablet, Rfl: 5    isosorbide mononitrate (IMDUR) 30 MG 24 hr tablet, TAKE 1/2 TABLET BY MOUTH TWO TIMES A DAY, Disp: 30 tablet, Rfl: 5    Multiple Vitamins-Minerals (PRESERVISION AREDS 2 PO), PreserVision AREDS, Disp: , Rfl:     nitroglycerin (NITROSTAT) 0.4 MG SL tablet, 1 TABLET(S) SUBLINGUAL AS NEEDED FOR CHEST PAIN (MAY REPEAT EVERY 5 MINUTES BUT SEEK MEDICAL HELP IF PAIN PERSISTS AFTER 3 TABLETS), Disp: 25 tablet, Rfl: 11    omeprazole (priLOSEC) 40 MG capsule, TAKE ONE CAPSULE TWICE DAILY, Disp: 60 capsule, Rfl: 5    rosuvastatin (Crestor) 20 MG tablet, Take 1 tablet by mouth Every Other Day., Disp: 45 tablet, Rfl: 3    saccharomyces boulardii (FLORASTOR) 250 MG capsule, Take 1 capsule by mouth 2 (Two) Times a Day., Disp: , Rfl:     tamsulosin (FLOMAX) 0.4 MG capsule 24 hr capsule, TAKE 1 CAPSULE BY MOUTH EVERY NIGHT., Disp: 30 capsule, Rfl: 5    tiotropium (SPIRIVA) 18 MCG per inhalation capsule, Place 1 capsule into inhaler and inhale Daily., Disp: , Rfl:     tolterodine LA (DETROL LA) 4 MG 24 hr capsule, TAKE 1 CAPSULE BY MOUTH EVERY NIGHT., Disp: 30 capsule, Rfl: 5    traZODone (DESYREL) 150 MG tablet, TAKE 1/2 TO 1 TABLET EVERY NIGHT, Disp: 90 tablet, Rfl: 3    triamterene-hydrochlorothiazide (MAXZIDE-25) 37.5-25 MG per tablet, TAKE 1 TABLET BY MOUTH DAILY. **HUMANA**, Disp: 30 tablet, Rfl: 5    Ventolin  (90 Base) MCG/ACT inhaler, USE 2 PUFFS FOUR TIMES A DAY AS NEEDED, Disp: 18 g, Rfl: 6    vitamin B-12 (CYANOCOBALAMIN) 500 MCG tablet,  Take 1 tablet by mouth Every Other Day., Disp: 15 tablet, Rfl: 11    Past Surgical History:   Procedure Laterality Date    CARPAL TUNNEL RELEASE      CATARACT EXTRACTION W/ INTRAOCULAR LENS IMPLANT  01/10/2012    Cataract extraction with intraocular lens implantation, right eye. Cataract, right eye.    CHOLECYSTECTOMY      COLONOSCOPY N/A 2021    Procedure: COLONOSCOPY;  Surgeon: Will Erickson MD;  Location: [facility] ENDOSCOPY;  Service: General;  Laterality: N/A;    CORONARY STENT PLACEMENT      CYSTOSCOPY TRANSURETHRAL RESECTION OF PROSTATE      ENDOSCOPY W/ PEG TUBE PLACEMENT  2012    Normal hypoph, esoph, duod, symm & patent pylorus. Hiatus hernia in GE junction.Irregularity of z-line compatable w/esophageal reflux.Moderately severe non-erosive gastritis in stomach.Multiple biopsies taken.Large amount of bile present n stomach.    EYE CAPSULOTOMY WITH LASER Right 2012    YAG    INJECTION OF MEDICATION  2013    Inj(s) Tend-Sheath, Ligament, Single  (Tendinitis, achilles)  2013 [facility]     OTHER SURGICAL HISTORY  2013    Small Joint Injection/Aspiration  (Osteoarthrosis, localized, primary, involving ankle and/or foot)        Family History   Problem Relation Age of Onset    Hypertension Mother     Hypertension Father        Social History     Socioeconomic History    Marital status:    Tobacco Use    Smoking status: Former     Packs/day: 2.00     Years: 41.00     Pack years: 82.00     Types: Cigarettes     Quit date: 1985     Years since quittin.6     Passive exposure: Past    Smokeless tobacco: Never   Substance and Sexual Activity    Alcohol use: No    Drug use: No    Sexual activity: Defer        Review of Systems   Constitutional: Negative.    HENT:  Positive for hearing loss and postnasal drip.    Eyes: Negative.    Respiratory:  Positive for wheezing.    Cardiovascular: Negative.    Gastrointestinal: Negative.   "  Endocrine: Negative.    Genitourinary: Negative.    Musculoskeletal:  Positive for joint swelling.        Stiffness, muscle pain   Skin: Negative.    Allergic/Immunologic: Negative.    Neurological:  Positive for numbness.   Hematological:  Bruises/bleeds easily.   Psychiatric/Behavioral:  Positive for sleep disturbance. The patient is nervous/anxious.      PHYSICAL EXAMINATION:       Ht 172.7 cm (68\")   Wt 120 kg (264 lb)   BMI 40.14 kg/mý     Physical Exam  Constitutional:       General: He is not in acute distress.     Appearance: Normal appearance.   Pulmonary:      Effort: Pulmonary effort is normal. No respiratory distress.   Neurological:      Mental Status: He is alert and oriented to person, place, and time.       GAIT:     []  Normal  [x]  Antalgic    Assistive device: [x]  None  []  Walker     []  Crutches  []  Cane     []  Wheelchair  []  Stretcher    Right Knee Exam     Comments:  He has some medial joint line tenderness but is very tender along the proximal tibia on the medial side.  He walks with a significant limp.  Mild swelling.  Mild effusion.  Good stability to varus and valgus testing.  Good distal pulses and sensation.                XR Knee 4+ View Right    Result Date: 7/28/2023  Narrative: Examination: XR KNEE RIGHT 4+ VIEWS Clinical History: Atraumatic, medial right knee pain for 10 days. Comparison: None available. Technique: Bilateral PA standing, lateral, tunnel, and patellar sunrise views of the right knee were obtained.  Findings: The imaged osseous structures are intact without findings of fracture or focal osseous erosion. Mild narrowing and osteophyte formation at the medial and patellofemoral compartments. Small suprapatellar joint effusion.    Impression: Mild osteoarthritic changes of the medial and patellofemoral compartments of the right knee with a small suprapatellar joint effusion. [id number]           ASSESSMENT:    Diagnoses and all orders for this " visit:    Internal derangement of right knee  -     MRI Knee Right Without Contrast; Future    Acute pain of right knee  -     MRI Knee Right Without Contrast; Future    Stage 3a chronic kidney disease    Morbid 40-45    Other orders  -     eszopiclone (LUNESTA) 3 MG tablet; Take 1 tablet by mouth Every Night. Take immediately before bedtime  -     folic acid (FOLVITE) 1 MG tablet; Take 1 tablet by mouth Daily.  -     saccharomyces boulardii (FLORASTOR) 250 MG capsule; Take 1 capsule by mouth 2 (Two) Times a Day.          PLAN    Patient has no specific injury but has been having significant pain in his right knee over the last 3 or 4 weeks.  He is walking with a significant limp and has significant tenderness along the medial aspect of the tibial plateau.  He cannot take anti-inflammatory medications due to chronic kidney disease.  Plan is to proceed with MRI of the right knee to assess for stress fracture and to help direct further treatment options.  He was encouraged to use a cane or crutches as needed for weightbearing support.  Continue ice and elevation.    Return for recheck for MRI results.    Carl Thomason MD

## 2023-08-24 DIAGNOSIS — K21.9 GASTROESOPHAGEAL REFLUX DISEASE, UNSPECIFIED WHETHER ESOPHAGITIS PRESENT: Chronic | ICD-10-CM

## 2023-08-24 DIAGNOSIS — E11.42 DIABETIC PERIPHERAL NEUROPATHY: Chronic | ICD-10-CM

## 2023-08-24 DIAGNOSIS — F41.1 GENERALIZED ANXIETY DISORDER: ICD-10-CM

## 2023-08-24 RX ORDER — GABAPENTIN 600 MG/1
600 TABLET ORAL NIGHTLY
Qty: 30 TABLET | Refills: 5 | Status: SHIPPED | OUTPATIENT
Start: 2023-08-24

## 2023-08-24 RX ORDER — ESCITALOPRAM OXALATE 5 MG/1
5 TABLET ORAL EVERY MORNING
Qty: 30 TABLET | Refills: 5 | Status: SHIPPED | OUTPATIENT
Start: 2023-08-24

## 2023-08-24 RX ORDER — OMEPRAZOLE 40 MG/1
CAPSULE, DELAYED RELEASE ORAL
Qty: 60 CAPSULE | Refills: 5 | Status: SHIPPED | OUTPATIENT
Start: 2023-08-24

## 2023-08-24 RX ORDER — CETIRIZINE HYDROCHLORIDE 10 MG/1
10 TABLET ORAL DAILY PRN
Qty: 30 TABLET | Refills: 5 | Status: SHIPPED | OUTPATIENT
Start: 2023-08-24

## 2023-08-24 RX ORDER — TRAZODONE HYDROCHLORIDE 150 MG/1
TABLET ORAL
Qty: 30 TABLET | Refills: 5 | Status: SHIPPED | OUTPATIENT
Start: 2023-08-24

## 2023-08-30 ENCOUNTER — HOSPITAL ENCOUNTER (OUTPATIENT)
Dept: MRI IMAGING | Facility: HOSPITAL | Age: 83
Discharge: HOME | End: 2023-08-30
Admitting: ORTHOPAEDIC SURGERY
Payer: MEDICARE

## 2023-08-30 DIAGNOSIS — M25.561 ACUTE PAIN OF RIGHT KNEE: ICD-10-CM

## 2023-08-30 DIAGNOSIS — M23.91 INTERNAL DERANGEMENT OF RIGHT KNEE: ICD-10-CM

## 2023-08-30 PROCEDURE — 73721 MRI JNT OF LWR EXTRE W/O DYE: CPT

## 2023-09-01 ENCOUNTER — TELEPHONE (OUTPATIENT)
Dept: ORTHOPEDIC SURGERY | Facility: CLINIC | Age: 83
End: 2023-09-01
Payer: MEDICARE

## 2023-09-01 NOTE — TELEPHONE ENCOUNTER
----- Message from Carl Thomason MD sent at 8/31/2023  4:58 PM CDT -----  Meniscal tear is probably irrelevant.  The subchondral fracture is probably why he is having pain.  Protected weight bearing is the key.  Use a walker to limit weight as much as possible over the next couple weeks.  Slowly increase weight as tolerated.  Followup in 3-4 weeks with repeat xrays (josr constantino tavia, kim)  CHRISTIN

## 2023-09-01 NOTE — TELEPHONE ENCOUNTER
Message relayed to patient spouse   Voiced understanding  Transferred to Karina @  to make appointment

## 2023-09-20 DIAGNOSIS — E78.1 HYPERTRIGLYCERIDEMIA: ICD-10-CM

## 2023-09-20 DIAGNOSIS — I25.10 CORONARY ARTERIOSCLEROSIS: ICD-10-CM

## 2023-09-21 RX ORDER — CLOPIDOGREL BISULFATE 75 MG/1
75 TABLET ORAL DAILY
Qty: 30 TABLET | Refills: 5 | Status: SHIPPED | OUTPATIENT
Start: 2023-09-21

## 2023-09-21 RX ORDER — ROSUVASTATIN CALCIUM 20 MG/1
TABLET, COATED ORAL
Qty: 15 TABLET | Refills: 5 | Status: SHIPPED | OUTPATIENT
Start: 2023-09-21

## 2023-09-26 ENCOUNTER — LAB (OUTPATIENT)
Dept: LAB | Facility: OTHER | Age: 83
End: 2023-09-26
Payer: MEDICARE

## 2023-09-26 DIAGNOSIS — E11.22 TYPE 2 DIABETES MELLITUS WITH STAGE 3A CHRONIC KIDNEY DISEASE, WITHOUT LONG-TERM CURRENT USE OF INSULIN: Chronic | ICD-10-CM

## 2023-09-26 DIAGNOSIS — C61 MALIGNANT TUMOR OF PROSTATE: Chronic | ICD-10-CM

## 2023-09-26 DIAGNOSIS — E79.0 HYPERURICEMIA: Chronic | ICD-10-CM

## 2023-09-26 DIAGNOSIS — I25.10 CORONARY ARTERIOSCLEROSIS: Chronic | ICD-10-CM

## 2023-09-26 DIAGNOSIS — M25.561 ACUTE PAIN OF RIGHT KNEE: Primary | ICD-10-CM

## 2023-09-26 DIAGNOSIS — E78.5 HYPERLIPIDEMIA ASSOCIATED WITH TYPE 2 DIABETES MELLITUS: Chronic | ICD-10-CM

## 2023-09-26 DIAGNOSIS — I10 ESSENTIAL HYPERTENSION: Chronic | ICD-10-CM

## 2023-09-26 DIAGNOSIS — E11.319 TYPE 2 DIABETES MELLITUS WITH BOTH EYES AFFECTED BY RETINOPATHY WITHOUT MACULAR EDEMA, WITHOUT LONG-TERM CURRENT USE OF INSULIN, UNSPECIFIED RETINOPATHY SEVERITY: Chronic | ICD-10-CM

## 2023-09-26 DIAGNOSIS — J43.2 CENTRILOBULAR EMPHYSEMA: Chronic | ICD-10-CM

## 2023-09-26 DIAGNOSIS — N18.31 TYPE 2 DIABETES MELLITUS WITH STAGE 3A CHRONIC KIDNEY DISEASE, WITHOUT LONG-TERM CURRENT USE OF INSULIN: Chronic | ICD-10-CM

## 2023-09-26 DIAGNOSIS — E53.8 VITAMIN B12 DEFICIENCY: Chronic | ICD-10-CM

## 2023-09-26 DIAGNOSIS — N18.31 STAGE 3A CHRONIC KIDNEY DISEASE: Chronic | ICD-10-CM

## 2023-09-26 DIAGNOSIS — E11.69 HYPERLIPIDEMIA ASSOCIATED WITH TYPE 2 DIABETES MELLITUS: Chronic | ICD-10-CM

## 2023-09-26 DIAGNOSIS — E66.01 CLASS 3 SEVERE OBESITY DUE TO EXCESS CALORIES WITH SERIOUS COMORBIDITY AND BODY MASS INDEX (BMI) OF 40.0 TO 44.9 IN ADULT: Chronic | ICD-10-CM

## 2023-09-26 LAB
ALBUMIN SERPL-MCNC: 3.9 G/DL (ref 3.5–5)
ALBUMIN/GLOB SERPL: 1.4 G/DL (ref 1.1–1.8)
ALP SERPL-CCNC: 85 U/L (ref 38–126)
ALT SERPL W P-5'-P-CCNC: 19 U/L
ANION GAP SERPL CALCULATED.3IONS-SCNC: 7 MMOL/L (ref 5–15)
ARTICHOKE IGE QN: 48 MG/DL (ref 0–100)
AST SERPL-CCNC: 22 U/L (ref 17–59)
BASOPHILS # BLD AUTO: 0.03 10*3/MM3 (ref 0–0.2)
BASOPHILS NFR BLD AUTO: 0.4 % (ref 0–1.5)
BILIRUB SERPL-MCNC: 0.4 MG/DL (ref 0–1.2)
BUN SERPL-MCNC: 23 MG/DL (ref 7–23)
BUN/CREAT SERPL: 16.8 (ref 7–25)
CALCIUM SPEC-SCNC: 9.2 MG/DL (ref 8.4–10.2)
CHLORIDE SERPL-SCNC: 106 MMOL/L (ref 101–112)
CO2 SERPL-SCNC: 30 MMOL/L (ref 22–30)
CREAT SERPL-MCNC: 1.37 MG/DL (ref 0.7–1.3)
DEPRECATED RDW RBC AUTO: 49.3 FL (ref 37–54)
EGFRCR SERPLBLD CKD-EPI 2021: 50.6 ML/MIN/1.73
EOSINOPHIL # BLD AUTO: 0.32 10*3/MM3 (ref 0–0.4)
EOSINOPHIL NFR BLD AUTO: 4.7 % (ref 0.3–6.2)
ERYTHROCYTE [DISTWIDTH] IN BLOOD BY AUTOMATED COUNT: 14.7 % (ref 12.3–15.4)
GLOBULIN UR ELPH-MCNC: 2.8 GM/DL (ref 2.3–3.5)
GLUCOSE SERPL-MCNC: 125 MG/DL (ref 70–99)
HBA1C MFR BLD: 6.4 % (ref 4.8–5.6)
HCT VFR BLD AUTO: 40.2 % (ref 37.5–51)
HGB BLD-MCNC: 13 G/DL (ref 13–17.7)
LYMPHOCYTES # BLD AUTO: 1.9 10*3/MM3 (ref 0.7–3.1)
LYMPHOCYTES NFR BLD AUTO: 27.7 % (ref 19.6–45.3)
MCH RBC QN AUTO: 30.8 PG (ref 26.6–33)
MCHC RBC AUTO-ENTMCNC: 32.3 G/DL (ref 31.5–35.7)
MCV RBC AUTO: 95.3 FL (ref 79–97)
MONOCYTES # BLD AUTO: 0.6 10*3/MM3 (ref 0.1–0.9)
MONOCYTES NFR BLD AUTO: 8.7 % (ref 5–12)
NEUTROPHILS NFR BLD AUTO: 4.01 10*3/MM3 (ref 1.7–7)
NEUTROPHILS NFR BLD AUTO: 58.5 % (ref 42.7–76)
PLATELET # BLD AUTO: 168 10*3/MM3 (ref 140–450)
PMV BLD AUTO: 10.7 FL (ref 6–12)
POTASSIUM SERPL-SCNC: 4.4 MMOL/L (ref 3.4–5)
PROT SERPL-MCNC: 6.7 G/DL (ref 6.3–8.6)
PSA SERPL-MCNC: 0.07 NG/ML (ref 0–4)
RBC # BLD AUTO: 4.22 10*6/MM3 (ref 4.14–5.8)
SODIUM SERPL-SCNC: 143 MMOL/L (ref 137–145)
TRIGL SERPL-MCNC: 229 MG/DL
TSH SERPL DL<=0.05 MIU/L-ACNC: 2.25 UIU/ML (ref 0.27–4.2)
URATE SERPL-MCNC: 5.7 MG/DL (ref 3.5–8.5)
VIT B12 BLD-MCNC: 638 PG/ML (ref 211–946)
WBC NRBC COR # BLD: 6.86 10*3/MM3 (ref 3.4–10.8)

## 2023-09-26 PROCEDURE — 82607 VITAMIN B-12: CPT | Performed by: INTERNAL MEDICINE

## 2023-09-26 PROCEDURE — 84550 ASSAY OF BLOOD/URIC ACID: CPT | Performed by: INTERNAL MEDICINE

## 2023-09-26 PROCEDURE — 85025 COMPLETE CBC W/AUTO DIFF WBC: CPT | Performed by: INTERNAL MEDICINE

## 2023-09-26 PROCEDURE — 83721 ASSAY OF BLOOD LIPOPROTEIN: CPT | Performed by: INTERNAL MEDICINE

## 2023-09-26 PROCEDURE — 83036 HEMOGLOBIN GLYCOSYLATED A1C: CPT | Performed by: INTERNAL MEDICINE

## 2023-09-26 PROCEDURE — 80053 COMPREHEN METABOLIC PANEL: CPT | Performed by: INTERNAL MEDICINE

## 2023-09-26 PROCEDURE — 84153 ASSAY OF PSA TOTAL: CPT | Performed by: INTERNAL MEDICINE

## 2023-09-26 PROCEDURE — 84478 ASSAY OF TRIGLYCERIDES: CPT | Performed by: INTERNAL MEDICINE

## 2023-09-26 PROCEDURE — 36415 COLL VENOUS BLD VENIPUNCTURE: CPT | Performed by: INTERNAL MEDICINE

## 2023-09-26 PROCEDURE — 84443 ASSAY THYROID STIM HORMONE: CPT | Performed by: INTERNAL MEDICINE

## 2023-09-29 ENCOUNTER — OFFICE VISIT (OUTPATIENT)
Dept: ORTHOPEDIC SURGERY | Facility: CLINIC | Age: 83
End: 2023-09-29
Payer: MEDICARE

## 2023-09-29 VITALS — WEIGHT: 264 LBS | HEIGHT: 68 IN | BODY MASS INDEX: 40.01 KG/M2

## 2023-09-29 DIAGNOSIS — M23.91 INTERNAL DERANGEMENT OF RIGHT KNEE: ICD-10-CM

## 2023-09-29 DIAGNOSIS — M25.561 ACUTE PAIN OF RIGHT KNEE: Primary | ICD-10-CM

## 2023-09-29 PROCEDURE — 99212 OFFICE O/P EST SF 10 MIN: CPT | Performed by: ORTHOPAEDIC SURGERY

## 2023-09-29 PROCEDURE — 1160F RVW MEDS BY RX/DR IN RCRD: CPT | Performed by: ORTHOPAEDIC SURGERY

## 2023-09-29 PROCEDURE — 1159F MED LIST DOCD IN RCRD: CPT | Performed by: ORTHOPAEDIC SURGERY

## 2023-09-29 NOTE — PROGRESS NOTES
"Eleno Savage is a 83 y.o. male returns for     Chief Complaint   Patient presents with    Right Knee - Follow-up       HISTORY OF PRESENT ILLNESS:  f/u right knee, xrays done today.   Patient states that he is 85 to 90% better.  He continues to walk with a cane.  He is progressing with mobility and activity.  No new complaints.     CONCURRENT MEDICAL HISTORY:    The following portions of the patient's history were reviewed and updated as appropriate: allergies, current medications, past family history, past medical history, past social history, past surgical history and problem list.     ROS  No fevers or chills.  No chest pain or shortness of air.  No GI or  disturbances.    PHYSICAL EXAMINATION:       Ht 172.7 cm (68\")   Wt 120 kg (264 lb)   BMI 40.14 kg/m²     Physical Exam  Constitutional:       General: He is not in acute distress.     Appearance: Normal appearance.   Pulmonary:      Effort: Pulmonary effort is normal. No respiratory distress.   Neurological:      Mental Status: He is alert and oriented to person, place, and time.       GAIT:     []  Normal  [x]  Antalgic    Assistive device: []  None  []  Walker     []  Crutches  [x]  Cane     []  Wheelchair  []  Stretcher    Right Knee Exam     Comments:  Tenderness along the medial aspect of the tibia and medial joint line is improved.  Mild tenderness.  Mild swelling.  Mild effusion.  Good stability to varus and valgus testing.  Good distal pulses and sensation.            XR Knee 1 or 2 View Right    Result Date: 9/29/2023  Narrative: Ordering Provider:  Carl Thomason MD Ordering Diagnosis/Indication:  Acute pain of right knee Procedure:  XR KNEE 1 OR 2 VW RIGHT Exam Date:  9/29/23 COMPARISON:  Not applicable, no relevant images available.     Impression:  AP bilateral standing with lateral of the right knee show acceptable position and alignment of both knees with no evidence of acute bony abnormality.  No fracture or dislocation is " noted.  Relative maintenance of joint spacing is maintained.  Mild arthritic changes are noted in the patellofemoral compartment and in the main joint compartments in both knees.  No acute findings. Carl Thomason MD 9/29/23            ASSESSMENT:    Diagnoses and all orders for this visit:    Acute pain of right knee    Internal derangement of right knee          PLAN    He has used a cane and his symptoms have significantly improved.  Likely, his pain was associated with the bone bruise.  He is improving.  We discussed continued limited activity and continue to slowly progress as tolerated.  No true restrictions.  He feels that he is on the way to recovery and will follow-up as needed.    Return if symptoms worsen or fail to improve, for recheck.    Carl Thomason MD

## 2023-11-08 NOTE — TELEPHONE ENCOUNTER
Refilll Hermann and Umesh performed. TP  
normal/soft/nontender/nondistended/normal active bowel sounds

## (undated) DEVICE — CANN SMPL SOFTECH BIFLO ETCO2 A/M 7FT

## (undated) DEVICE — SINGLE-USE BIOPSY FORCEPS: Brand: RADIAL JAW 4